# Patient Record
Sex: MALE | Race: WHITE | NOT HISPANIC OR LATINO | Employment: OTHER | URBAN - METROPOLITAN AREA
[De-identification: names, ages, dates, MRNs, and addresses within clinical notes are randomized per-mention and may not be internally consistent; named-entity substitution may affect disease eponyms.]

---

## 2017-02-20 DIAGNOSIS — E11.9 DIABETES MELLITUS (H): ICD-10-CM

## 2017-02-21 RX ORDER — SIMVASTATIN 40 MG
40 TABLET ORAL DAILY
Qty: 90 TABLET | Refills: 3 | Status: SHIPPED | OUTPATIENT
Start: 2017-02-21 | End: 2018-02-10

## 2017-02-21 NOTE — TELEPHONE ENCOUNTER
simvastatin (ZOCOR) 40 MG tablet      Last Written Prescription Date:  3-22-16  Last Fill Quantity: 90,   # refills: 3  Last Office Visit : 9-1-16  Future Office visit:  3-7-17    Kathleen M Doege RN

## 2017-03-07 ENCOUNTER — OFFICE VISIT (OUTPATIENT)
Dept: ENDOCRINOLOGY | Facility: CLINIC | Age: 67
End: 2017-03-07

## 2017-03-07 VITALS
DIASTOLIC BLOOD PRESSURE: 89 MMHG | HEIGHT: 71 IN | WEIGHT: 273.8 LBS | HEART RATE: 72 BPM | BODY MASS INDEX: 38.33 KG/M2 | SYSTOLIC BLOOD PRESSURE: 146 MMHG

## 2017-03-07 DIAGNOSIS — E11.65 TYPE 2 DIABETES MELLITUS WITH HYPERGLYCEMIA, WITHOUT LONG-TERM CURRENT USE OF INSULIN (H): Primary | ICD-10-CM

## 2017-03-07 LAB — HBA1C MFR BLD: 7.7 % (ref 4.3–6)

## 2017-03-07 ASSESSMENT — PAIN SCALES - GENERAL: PAINLEVEL: NO PAIN (0)

## 2017-03-07 NOTE — NURSING NOTE
"Chief Complaint   Patient presents with     RECHECK     DIABETES 2 F/U        Initial /89 (BP Location: Right arm, Patient Position: Chair, Cuff Size: Adult Large)  Pulse 72  Ht 1.803 m (5' 11\")  Wt 124.2 kg (273 lb 12.8 oz)  BMI 38.19 kg/m2 Estimated body mass index is 38.19 kg/(m^2) as calculated from the following:    Height as of this encounter: 1.803 m (5' 11\").    Weight as of this encounter: 124.2 kg (273 lb 12.8 oz).  Medication Reconciliation: complete       Angela Dixon CMA     "

## 2017-03-07 NOTE — PROGRESS NOTES
HPI   Mark Garces is a 66 year old male with type 2 diabetes mellitus here today for follow up visit.   Pt's hx is significant for type 2 DM dx in 2007, obesity and past hx of Hepatitis C which has been treated successfully.  For his diabetes, he is currently taking Metformin 500 mg 2 tabs po BID and Januvia 100 mg daily.   He tolerates both meds well.  Pt's A1C is 7.7 % today.  Pt's previous A1C was 7.5 %.   He did not bring his glucose meter to his visit today.  On ROS today, he reports feeling well.  His weight is up today.  He plans to start walking more this spring and summer.  He reports less back, hip and right leg pain.  He states is feeling better overall.  Some intermittent diarrhea which may be from his Metformin.  Pt denies any recent visual problems, n/v, SOB at rest, cough, chest pain or abd pain.   No dysuria, hematuria or foot ulcers.  Some numbness and pain intermittently in this right leg. He has hx chronic back issues.  No stool/urine incontinence.  He denies any numbness, tingling or pain in his left foot/leg.    DIABETES CARE:  Retinopathy: none; he is due for an Oph exam which he states he will schedule.  Nephropathy: none; urine microalbuminuria borderline last visit. I placed an order for another urine microalbuminuria today.  Neuropathy: no peripheral neuropathy; he does have some decrease sensor right foot.   Taking ASA: yes.  Lipids: LDL 87 in 4/2016 on Simvastatin.    ROS   Please see under HPI.     ALLERGIES:   No Known Allergies      Prescription Medications as of 3/7/2017             simvastatin (ZOCOR) 40 MG tablet Take 1 tablet (40 mg) by mouth daily    sitagliptin (JANUVIA) 100 MG tablet Take 1 tablet (100 mg) by mouth daily    metFORMIN (GLUCOPHAGE) 500 MG tablet Take two tabs twice daily.    aspirin 81 MG tablet Take 1 tablet by mouth daily.    Omega-3 Fatty Acids (FISH OIL) 500 MG CAPS Take 2 capsules by mouth daily.    glucose blood VI test strips (ONE TOUCH ULTRA TEST) strip  "Reported on 3/7/2017          Family Hx   No family hx of diabetes.    Personal Hx   Smoke: Pt quit smoking  > 3 years ago.  ETOH : None x 8 years.    PMH   1. Type 2 Diabetes Mellitus dx in 2007.   2. Hepatitis C; pt underwent tx for Hep C and his Hep C has cleared.  3. Obesity.  4. Past hx of nicotine use.   5. Hx of undescended testis s/p surgery in 1971.   6. Anemia while undergoing hep C treatment.    Physical Exam   General appearance:   Vitals:    03/07/17 0930   BP: 146/89   BP Location: Right arm   Patient Position: Chair   Cuff Size: Adult Large   Pulse: 72   Weight: 124.2 kg (273 lb 12.8 oz)   Height: 1.803 m (5' 11\")   GENERAL: Pt in no apparent distress.  SKIN: Dry.  HEENT: PERRLA; fundi not examined.  NECK: No goiter.  LUNGS: Clear b/l.  CARDIAC: RRR.  ABDOMEN: Large and nontender.  EXTREMITIES: No pretibial edema b/l.  FEET: Warm, no ulcers and normal monofilamentous exam left foot. Some decrease sensory right foot.    Results   Creatinine   Date Value Ref Range Status   04/06/2016 0.85 0.66 - 1.25 mg/dL Final     GFR Estimate   Date Value Ref Range Status   04/06/2016 >90  Non  GFR Calc   >60 mL/min/1.7m2 Final     Hemoglobin A1C   Date Value Ref Range Status   07/17/2013 7.0 (A) 4.3 - 6.0 % Final     Potassium   Date Value Ref Range Status   04/06/2016 4.0 3.4 - 5.3 mmol/L Final     ALT   Date Value Ref Range Status   04/06/2016 17 0 - 70 U/L Final     AST   Date Value Ref Range Status   04/06/2016 9 0 - 45 U/L Final     TSH   Date Value Ref Range Status   04/06/2016 2.72 0.40 - 4.00 mU/L Final     T4 Free   Date Value Ref Range Status   04/06/2016 0.92 0.76 - 1.46 ng/dL Final         Cholesterol   Date Value Ref Range Status   04/06/2016 156 <200 mg/dL Final   08/20/2014 136 <200 mg/dL Final     Comment:     LDL Cholesterol is the primary guide to therapy.   The NCEP recommends further evaluation of: patients with cholesterol greater   than 200 mg/dL if additional risk factors are " present, cholesterol greater   than   240 mg/dL, triglycerides greater than 150 mg/dL, or HDL less than 40 mg/dL.       HDL Cholesterol   Date Value Ref Range Status   04/06/2016 47 >39 mg/dL Final   08/20/2014 49 >40 mg/dL Final     LDL Cholesterol Calculated   Date Value Ref Range Status   04/06/2016 87 <100 mg/dL Final     Comment:     Desirable:       <100 mg/dl   08/20/2014 75 0 - 129 mg/dL Final     Comment:     LDL Cholesterol is the primary guide to therapy: LDL-cholesterol goal in high   risk patients is <100 mg/dL and in very high risk patients is <70 mg/dL.       Triglycerides   Date Value Ref Range Status   04/06/2016 107 <150 mg/dL Final   08/20/2014 58 0 - 150 mg/dL Final     Comment:     Fasting specimen     Cholesterol/HDL Ratio   Date Value Ref Range Status   08/20/2014 2.8 0.0 - 5.0 Final   11/19/2013 3.7 0.0 - 5.0 Final     A1C      7.7   3/7/2017  A1C      7.5   4/6/2016  A1C      7.4   7/28/2015  A1C      7.0   8/20/2014  A1C      6.8   3/18/2014  A1C      6.9  11/20/2013  A1C      7.0   7/17/2013  A1C      7.2   3/19/2013    ASSESSMENT/PLAN:     1. TYPE 2 DIABETES MELLITUS: Mark plans to increase his activity this spring and focus on making healthy food choices.  I have asked him to remain on Metformin and Januvia which he tolerates well.  His creat was good at 0.85 with GFR > 90 mL/min in 4/2016.  His urine microalbuminuria is borderline ( 17.27 mg/g Cr). I placed an order to have his urine microalbuminuria rechecked and if + , I plan to start him on an ace or ARB.  I have asked him to check his blood sugar at home fasting in the am and predinner daily and to bring his glucose meter to his visits.  He remains on daily ASA.   Pt will schedule an Oph exam near his home.  BP stable today.   Pt's TSH normal in 4/2016.    2. HYPERLIPIDEMIA: Pt's LDL 87 in 4/2016.  He remains on Simvastatin.  Both ALT/AST were normal in 4/2016.    3. OVERWEIGHT: Weight is up today.  Encouraged pt to make healthy  food choices, reduce food portions with meals, avoid snacking and increase his walking and activity.    4.  HX OF HEPATITIS C: Pt has completed his hep C treatment and states his hep C has clear.      5.  RIGHT FOOT NUMBNESS/TINGLING: I suggested he discuss this with his PCP.  This may be related to his chronic back issues.    6. Return to Endocrine Clinic to see me in 6 months.

## 2017-03-07 NOTE — MR AVS SNAPSHOT
After Visit Summary   3/7/2017    Mark Garces    MRN: [id number]           Patient Information     Date Of Birth          1950        Visit Information        Provider Department      3/7/2017 9:30 AM Elizabeth Guzman PA-C [facility]        Today's Diagnoses     Type 2 diabetes mellitus with hyperglycemia, without long-term current use of insulin (H)    -  1       Follow-ups after your visit        Follow-up notes from your care team     Return in about 6 months (around 9/7/2017).      Your next 10 appointments already scheduled     Sep 07, 2017  9:00 AM CDT   (Arrive by 8:45 AM)   RETURN DIABETES with KEYSHA Jiménez [facility] ([facility])    [address] [place] [id number] [phone]              Future tests that were ordered for you today     Open Future Orders        Priority Expected Expires Ordered    Albumin Random Urine Quantitative Routine 4/1/2017 7/1/2017 3/7/2017    Creatinine Routine 4/1/2017 7/1/2017 3/7/2017    Lipid Profile Routine 4/1/2017 7/1/2017 3/7/2017    TSH Routine 4/1/2017 7/1/2017 3/7/2017    Potassium Routine 4/1/2017 7/1/2017 3/7/2017    AST Routine 4/1/2017 7/1/2017 3/7/2017    ALT Routine 4/1/2017 7/1/2017 3/7/2017    T4 free Routine 4/1/2017 7/1/2017 3/7/2017            Who to contact     Please call your clinic at [phone] to:    Ask questions about your health    Make or cancel appointments    Discuss your medicines    Learn about your test results    Speak to your doctor   If you have compliments or concerns about an experience at your clinic, or if you wish to file a complaint, please contact [facility] Patient Relations at [phone] or email us at [email or web address]         Additional Information About Your Visit        MyChart Information     MyChart gives you secure access to your electronic health record. If  "you see a primary care provider, you can also send messages to your care team and make appointments. If you have questions, please call your primary care clinic.  If you do not have a primary care provider, please call [phone] and they will assist you.      "[employer]" is an electronic gateway that provides easy, online access to your medical records. With "[employer]", you can request a clinic appointment, read your test results, renew a prescription or communicate with your care team.     To access your existing account, please contact your [facility] or call [phone] for assistance.        Care EveryWhere ID     This is your Care EveryWhere ID. This could be used by other organizations to access your [facility] medical records  [id number]        Your Vitals Were     Pulse Height BMI (Body Mass Index)             72 1.803 m (5' 11\") 38.19 kg/m2          Blood Pressure from Last 3 Encounters:   03/07/17 146/89   09/02/16 138/72   04/06/16 159/78    Weight from Last 3 Encounters:   03/07/17 124.2 kg (273 lb 12.8 oz)   09/02/16 121.2 kg (267 lb 4.8 oz)   04/06/16 119.4 kg (263 lb 3.2 oz)              We Performed the Following     HC FLU VACCINE, INCREASED ANTIGEN, PRESV FREE        Primary Care Provider Office Phone # Fax #    [facility] [place] [phone]       [address]  [facility] [address]        Thank you!     Thank you for choosing [facility]  for your care. Our goal is always to provide you with excellent care. Hearing back from our patients is one way we can continue to improve our services. Please take a few minutes to complete the written survey that you may receive in the mail after your visit with us. Thank you!             Your Updated Medication List - Protect others around you: Learn how to safely use, store and throw away your medicines at www.disposemymeds.org.          This list is accurate as of: 3/7/17 " 10:13 AM.  Always use your most recent med list.                   Brand Name Dispense Instructions for use    aspirin 81 MG tablet      Take 1 tablet by mouth daily.       Fish Oil 500 MG Caps      Take 2 capsules by mouth daily.       metFORMIN 500 MG tablet    GLUCOPHAGE    360 tablet    Take two tabs twice daily.       ONE TOUCH ULTRA test strip   Generic drug:  blood glucose monitoring      Reported on 3/7/2017       simvastatin 40 MG tablet    ZOCOR    90 tablet    Take 1 tablet (40 mg) by mouth daily       sitagliptin 100 MG tablet    JANUVIA    90 tablet    Take 1 tablet (100 mg) by mouth daily

## 2017-03-07 NOTE — LETTER
3/7/2017       RE: Mark Garces  [address] [facility] [address]     Dear Colleague,    Thank you for referring your patient, Mark Garces, to the [facility] at [facility]. Please see a copy of my visit note below.    HPI   Mark Garces is a 66 year old male with type 2 diabetes mellitus here today for follow up visit.   Pt's hx is significant for type 2 DM dx in 2007, obesity and past hx of Hepatitis C which has been treated successfully.  For his diabetes, he is currently taking Metformin 500 mg 2 tabs po BID and Januvia 100 mg daily.   He tolerates both meds well.  Pt's A1C is 7.7 % today.  Pt's previous A1C was 7.5 %.   He did not bring his glucose meter to his visit today.  On ROS today, he reports feeling well.  His weight is up today.  He plans to start walking more this spring and summer.  He reports less back, hip and right leg pain.  He states is feeling better overall.  Some intermittent diarrhea which may be from his Metformin.  Pt denies any recent visual problems, n/v, SOB at rest, cough, chest pain or abd pain.   No dysuria, hematuria or foot ulcers.  Some numbness and pain intermittently in this right leg. He has hx chronic back issues.  No stool/urine incontinence.  He denies any numbness, tingling or pain in his left foot/leg.    DIABETES CARE:  Retinopathy: none; he is due for an Oph exam which he states he will schedule.  Nephropathy: none; urine microalbuminuria borderline last visit. I placed an order for another urine microalbuminuria today.  Neuropathy: no peripheral neuropathy; he does have some decrease sensor right foot.   Taking ASA: yes.  Lipids: LDL 87 in 4/2016 on Simvastatin.    ROS   Please see under HPI.     ALLERGIES:   No Known Allergies      Prescription Medications as of 3/7/2017             simvastatin (ZOCOR) 40 MG tablet Take 1 tablet (40 mg) by mouth daily    sitagliptin (JANUVIA) 100 MG tablet Take 1 tablet (100 mg)  "by mouth daily    metFORMIN (GLUCOPHAGE) 500 MG tablet Take two tabs twice daily.    aspirin 81 MG tablet Take 1 tablet by mouth daily.    Omega-3 Fatty Acids (FISH OIL) 500 MG CAPS Take 2 capsules by mouth daily.    glucose blood VI test strips (ONE TOUCH ULTRA TEST) strip Reported on 3/7/2017          Family Hx   No family hx of diabetes.    Personal Hx   Smoke: Pt quit smoking  > 3 years ago.  ETOH : None x 8 years.    PMH   1. Type 2 Diabetes Mellitus dx in 2007.   2. Hepatitis C; pt underwent tx for Hep C and his Hep C has cleared.  3. Obesity.  4. Past hx of nicotine use.   5. Hx of undescended testis s/p surgery in 1971.   6. Anemia while undergoing hep C treatment.    Physical Exam   General appearance:   Vitals:    03/07/17 0930   BP: 146/89   BP Location: Right arm   Patient Position: Chair   Cuff Size: Adult Large   Pulse: 72   Weight: 124.2 kg (273 lb 12.8 oz)   Height: 1.803 m (5' 11\")   GENERAL: Pt in no apparent distress.  SKIN: Dry.  HEENT: PERRLA; fundi not examined.  NECK: No goiter.  LUNGS: Clear b/l.  CARDIAC: RRR.  ABDOMEN: Large and nontender.  EXTREMITIES: No pretibial edema b/l.  FEET: Warm, no ulcers and normal monofilamentous exam left foot. Some decrease sensory right foot.    Results   Creatinine   Date Value Ref Range Status   04/06/2016 0.85 0.66 - 1.25 mg/dL Final     GFR Estimate   Date Value Ref Range Status   04/06/2016 >90  Non  GFR Calc   >60 mL/min/1.7m2 Final     Hemoglobin A1C   Date Value Ref Range Status   07/17/2013 7.0 (A) 4.3 - 6.0 % Final     Potassium   Date Value Ref Range Status   04/06/2016 4.0 3.4 - 5.3 mmol/L Final     ALT   Date Value Ref Range Status   04/06/2016 17 0 - 70 U/L Final     AST   Date Value Ref Range Status   04/06/2016 9 0 - 45 U/L Final     TSH   Date Value Ref Range Status   04/06/2016 2.72 0.40 - 4.00 mU/L Final     T4 Free   Date Value Ref Range Status   04/06/2016 0.92 0.76 - 1.46 ng/dL Final         Cholesterol   Date Value Ref " Range Status   04/06/2016 156 <200 mg/dL Final   08/20/2014 136 <200 mg/dL Final     Comment:     LDL Cholesterol is the primary guide to therapy.   The NCEP recommends further evaluation of: patients with cholesterol greater   than 200 mg/dL if additional risk factors are present, cholesterol greater   than   240 mg/dL, triglycerides greater than 150 mg/dL, or HDL less than 40 mg/dL.       HDL Cholesterol   Date Value Ref Range Status   04/06/2016 47 >39 mg/dL Final   08/20/2014 49 >40 mg/dL Final     LDL Cholesterol Calculated   Date Value Ref Range Status   04/06/2016 87 <100 mg/dL Final     Comment:     Desirable:       <100 mg/dl   08/20/2014 75 0 - 129 mg/dL Final     Comment:     LDL Cholesterol is the primary guide to therapy: LDL-cholesterol goal in high   risk patients is <100 mg/dL and in very high risk patients is <70 mg/dL.       Triglycerides   Date Value Ref Range Status   04/06/2016 107 <150 mg/dL Final   08/20/2014 58 0 - 150 mg/dL Final     Comment:     Fasting specimen     Cholesterol/HDL Ratio   Date Value Ref Range Status   08/20/2014 2.8 0.0 - 5.0 Final   11/19/2013 3.7 0.0 - 5.0 Final     A1C      7.7   3/7/2017  A1C      7.5   4/6/2016  A1C      7.4   7/28/2015  A1C      7.0   8/20/2014  A1C      6.8   3/18/2014  A1C      6.9  11/20/2013  A1C      7.0   7/17/2013  A1C      7.2   3/19/2013    ASSESSMENT/PLAN:     1. TYPE 2 DIABETES MELLITUS: Mark plans to increase his activity this spring and focus on making healthy food choices.  I have asked him to remain on Metformin and Januvia which he tolerates well.  His creat was good at 0.85 with GFR > 90 mL/min in 4/2016.  His urine microalbuminuria is borderline ( 17.27 mg/g Cr). I placed an order to have his urine microalbuminuria rechecked and if + , I plan to start him on an ace or ARB.  I have asked him to check his blood sugar at home fasting in the am and predinner daily and to bring his glucose meter to his visits.  He remains on daily ASA.    Pt will schedule an Oph exam near his home.  BP stable today.   Pt's TSH normal in 4/2016.    2. HYPERLIPIDEMIA: Pt's LDL 87 in 4/2016.  He remains on Simvastatin.  Both ALT/AST were normal in 4/2016.    3. OVERWEIGHT: Weight is up today.  Encouraged pt to make healthy food choices, reduce food portions with meals, avoid snacking and increase his walking and activity.    4.  HX OF HEPATITIS C: Pt has completed his hep C treatment and states his hep C has clear.      5.  RIGHT FOOT NUMBNESS/TINGLING: I suggested he discuss this with his PCP.  This may be related to his chronic back issues.    6. Return to Endocrine Clinic to see me in 6 months.    Again, thank you for allowing me to participate in the care of your patient.      Sincerely,    Elizabeth Guzman PA-C

## 2017-03-07 NOTE — NURSING NOTE
Administered influenza high dose injection per patient request.  Patient identified by name and .  Please see medication note for further med detail. Patient tolerated injection well.                Mark Garces      1.  Has the patient received the information for the influenza vaccine? YES    2.  Does the patient have any of the following contraindications?     Allergy to eggs? No     Allergic reaction to previous influenza vaccines? No     Any other problems to previous influenza vaccines? No     Paralyzed by Guillain-Pembroke syndrome? No     Currently pregnant? NO     Current moderate or severe illness? No     Allergy to contact lens solution? No    3.  The vaccine has been administered in the usual fashion and the patient was instructed to wait 20 minutes before leaving the building in the event of an allergic reaction: YES    Vaccination given by Angela Dixon CMA.  Recorded by Angela Dixon

## 2017-08-09 DIAGNOSIS — E11.9 TYPE 2 DIABETES MELLITUS WITHOUT COMPLICATION (H): ICD-10-CM

## 2017-08-09 NOTE — TELEPHONE ENCOUNTER
metFORMIN       Last Written Prescription Date:  9/7/16  Last Fill Quantity: 360,   # refills: 3  Last Office Visit : 3/7/17  Future Office visit:  9/7/17

## 2017-09-07 ENCOUNTER — OFFICE VISIT (OUTPATIENT)
Dept: ENDOCRINOLOGY | Facility: CLINIC | Age: 67
End: 2017-09-07

## 2017-09-07 VITALS
SYSTOLIC BLOOD PRESSURE: 131 MMHG | HEART RATE: 89 BPM | WEIGHT: 274 LBS | BODY MASS INDEX: 38.36 KG/M2 | HEIGHT: 71 IN | DIASTOLIC BLOOD PRESSURE: 85 MMHG

## 2017-09-07 DIAGNOSIS — E11.65 TYPE 2 DIABETES MELLITUS WITH HYPERGLYCEMIA, WITHOUT LONG-TERM CURRENT USE OF INSULIN (H): Primary | ICD-10-CM

## 2017-09-07 DIAGNOSIS — E11.65 TYPE 2 DIABETES MELLITUS WITH HYPERGLYCEMIA, WITHOUT LONG-TERM CURRENT USE OF INSULIN (H): ICD-10-CM

## 2017-09-07 LAB
ALT SERPL W P-5'-P-CCNC: 18 U/L (ref 0–70)
AST SERPL W P-5'-P-CCNC: 9 U/L (ref 0–45)
CHOLEST SERPL-MCNC: 132 MG/DL
CREAT SERPL-MCNC: 0.89 MG/DL (ref 0.66–1.25)
CREAT UR-MCNC: 147 MG/DL
GFR SERPL CREATININE-BSD FRML MDRD: 85 ML/MIN/1.7M2
HBA1C MFR BLD: 8.5 % (ref 4.3–6)
HDLC SERPL-MCNC: 43 MG/DL
LDLC SERPL CALC-MCNC: 64 MG/DL
MICROALBUMIN UR-MCNC: 14 MG/L
MICROALBUMIN/CREAT UR: 9.66 MG/G CR (ref 0–17)
NONHDLC SERPL-MCNC: 89 MG/DL
POTASSIUM SERPL-SCNC: 4.4 MMOL/L (ref 3.4–5.3)
T4 FREE SERPL-MCNC: 1.02 NG/DL (ref 0.76–1.46)
TRIGL SERPL-MCNC: 124 MG/DL
TSH SERPL DL<=0.005 MIU/L-ACNC: 2.49 MU/L (ref 0.4–4)

## 2017-09-07 ASSESSMENT — PAIN SCALES - GENERAL: PAINLEVEL: NO PAIN (0)

## 2017-09-07 NOTE — PROGRESS NOTES
HPI   Mark Garces is a 66 year old male with type 2 diabetes mellitus here today for follow up visit.   Pt's hx is significant for type 2 DM dx in 2007, obesity and past hx of Hepatitis C which has been treated successfully.  For his diabetes, he is currently taking Metformin 500 mg 2 tabs po BID and Januvia 100 mg daily.   He tolerates both meds well.  Mark denies missing his medications.  Pt's A1C is 8.5 %  today.  Pt's previous A1C was 7.7 %.   He did not bring his glucose meter to his visit today.  On ROS today, he reports feeling well.  His weight is up today.  He has been less active and admits to eating more carbs.  He reports less back, hip and right leg pain.    Some intermittent diarrhea which may be from his Metformin.  Pt denies any recent visual problems, n/v, SOB at rest, cough, chest pain or abd pain.   No dysuria, hematuria or foot ulcers.  Some numbness and pain intermittently in this right leg. He has hx chronic back issues.  No stool/urine incontinence.  He denies any numbness, tingling or pain in his left foot/leg.    DIABETES CARE:  Retinopathy: none; he is due for an Oph exam which he states he will schedule.  Nephropathy: none; urine microalbuminuria negative today.  Neuropathy: no peripheral neuropathy; he does have some decrease sensor right foot.   Taking ASA: yes.  Lipids: LDL 64 in 9/2017 on Simvastatin.    ROS   Please see under HPI.     ALLERGIES:   No Known Allergies      Prescription Medications as of 9/7/2017             metFORMIN (GLUCOPHAGE) 500 MG tablet Take 2 tablets (1,000 mg) by mouth 2 times daily (with meals)    simvastatin (ZOCOR) 40 MG tablet Take 1 tablet (40 mg) by mouth daily    sitagliptin (JANUVIA) 100 MG tablet Take 1 tablet (100 mg) by mouth daily    aspirin 81 MG tablet Take 1 tablet by mouth daily.    Omega-3 Fatty Acids (FISH OIL) 500 MG CAPS Take 2 capsules by mouth daily.    glucose blood VI test strips (ONE TOUCH ULTRA TEST) strip Reported on 3/7/2017     "      Family Hx   No family hx of diabetes.    Personal Hx   Smoke: None at this time.  ETOH : None at this time.    PMH   1. Type 2 Diabetes Mellitus dx in 2007.   2. Hepatitis C; pt underwent tx for Hep C and his Hep C has cleared.  3. Obesity.  4. Past hx of nicotine use.   5. Hx of undescended testis s/p surgery in 1971.   6. Anemia while undergoing hep C treatment.    Physical Exam   General appearance:   Vitals:    09/07/17 0852   BP: 131/85   Pulse: 89   Weight: 124.3 kg (274 lb)   Height: 1.803 m (5' 11\")     GENERAL: Pt in no apparent distress.  SKIN: No rash.  HEENT: PERRLA; fundi not examined.  NECK: No goiter.  LUNGS: Clear b/l.  CARDIAC: RRR.  ABDOMEN: Large and nontender.  EXTREMITIES: No pretibial edema b/l.  FEET: Warm, no ulcers and normal monofilamentous exam left foot. Some decrease sensory right foot.    Results   Creatinine   Date Value Ref Range Status   04/06/2016 0.85 0.66 - 1.25 mg/dL Final     GFR Estimate   Date Value Ref Range Status   04/06/2016 >90  Non  GFR Calc   >60 mL/min/1.7m2 Final     Hemoglobin A1C   Date Value Ref Range Status   07/17/2013 7.0 (A) 4.3 - 6.0 % Final     Potassium   Date Value Ref Range Status   04/06/2016 4.0 3.4 - 5.3 mmol/L Final     ALT   Date Value Ref Range Status   04/06/2016 17 0 - 70 U/L Final     AST   Date Value Ref Range Status   04/06/2016 9 0 - 45 U/L Final     TSH   Date Value Ref Range Status   04/06/2016 2.72 0.40 - 4.00 mU/L Final     T4 Free   Date Value Ref Range Status   04/06/2016 0.92 0.76 - 1.46 ng/dL Final         Cholesterol   Date Value Ref Range Status   04/06/2016 156 <200 mg/dL Final   08/20/2014 136 <200 mg/dL Final     Comment:     LDL Cholesterol is the primary guide to therapy.   The NCEP recommends further evaluation of: patients with cholesterol greater   than 200 mg/dL if additional risk factors are present, cholesterol greater   than   240 mg/dL, triglycerides greater than 150 mg/dL, or HDL less than 40 " mg/dL.       HDL Cholesterol   Date Value Ref Range Status   04/06/2016 47 >39 mg/dL Final   08/20/2014 49 >40 mg/dL Final     LDL Cholesterol Calculated   Date Value Ref Range Status   04/06/2016 87 <100 mg/dL Final     Comment:     Desirable:       <100 mg/dl   08/20/2014 75 0 - 129 mg/dL Final     Comment:     LDL Cholesterol is the primary guide to therapy: LDL-cholesterol goal in high   risk patients is <100 mg/dL and in very high risk patients is <70 mg/dL.       Triglycerides   Date Value Ref Range Status   04/06/2016 107 <150 mg/dL Final   08/20/2014 58 0 - 150 mg/dL Final     Comment:     Fasting specimen     Cholesterol/HDL Ratio   Date Value Ref Range Status   08/20/2014 2.8 0.0 - 5.0 Final   11/19/2013 3.7 0.0 - 5.0 Final     A1C      8.5   9/7/2017  A1C      7.7   3/7/2017  A1C      7.5   4/6/2016  A1C      7.4   7/28/2015  A1C      7.0   8/20/2014  A1C      6.8   3/18/2014  A1C      6.9  11/20/2013  A1C      7.0   7/17/2013  A1C      7.2   3/19/2013    ASSESSMENT/PLAN:     1. TYPE 2 DIABETES MELLITUS: Uncontrolled type 2 diabetes mellitus with an A1C of 8.5 % today.  Mark would like to work on his diet, lose weight and increase his activity prior to adding a new diabetic medication.  He refuses all injection medications.  For now, he is to remain on Metformin 1000 mg BID and Januvia 100 mg daily and see me in clinic in 3 months.  If his A1C is > 8.0 % next visit, will discuss adding Jardiance.  He does not want to use a GLP-1 med.  He is very fearful of any injection medications.  I asked him to schedule an Oph exam this Fall.  Feet are ok.  His urine microalbuminuria was negative today with a normal creat/GFR.  His TSH is normal today.    2. HYPERLIPIDEMIA: Pt's LDL 64 today.  He remains on Simvastatin.  Both ALT/AST are normal today.    3. OVERWEIGHT: Weight is up today.  Encouraged pt to make healthy food choices, reduce food portions with meals, avoid snacking and increase his walking and  activity.    4.  HX OF HEPATITIS C: Pt has completed his hep C treatment and states his hep C has clear.      5.  RIGHT FOOT NUMBNESS/TINGLING: I suggested he discuss this with his PCP.  This may be related to his chronic back issues.  He states he will schedule an annual exam with his PCP.    6. Return to Endocrine Clinic to see me in 3 months.

## 2017-09-07 NOTE — LETTER
9/7/2017       RE: Mark Garces  [address] [facility] [address]     Dear Colleague,    Thank you for referring your patient, Mark Garces, to the [facility] ENDOCRINOLOGY at [facility]. Please see a copy of my visit note below.    HPI   Mark Garces is a 66 year old male with type 2 diabetes mellitus here today for follow up visit.   Pt's hx is significant for type 2 DM dx in 2007, obesity and past hx of Hepatitis C which has been treated successfully.  For his diabetes, he is currently taking Metformin 500 mg 2 tabs po BID and Januvia 100 mg daily.   He tolerates both meds well.  Mark denies missing his medications.  Pt's A1C is 8.5 %  today.  Pt's previous A1C was 7.7 %.   He did not bring his glucose meter to his visit today.  On ROS today, he reports feeling well.  His weight is up today.  He has been less active and admits to eating more carbs.  He reports less back, hip and right leg pain.    Some intermittent diarrhea which may be from his Metformin.  Pt denies any recent visual problems, n/v, SOB at rest, cough, chest pain or abd pain.   No dysuria, hematuria or foot ulcers.  Some numbness and pain intermittently in this right leg. He has hx chronic back issues.  No stool/urine incontinence.  He denies any numbness, tingling or pain in his left foot/leg.    DIABETES CARE:  Retinopathy: none; he is due for an Oph exam which he states he will schedule.  Nephropathy: none; urine microalbuminuria negative today.  Neuropathy: no peripheral neuropathy; he does have some decrease sensor right foot.   Taking ASA: yes.  Lipids: LDL 64 in 9/2017 on Simvastatin.    ROS   Please see under HPI.     ALLERGIES:   No Known Allergies      Prescription Medications as of 9/7/2017             metFORMIN (GLUCOPHAGE) 500 MG tablet Take 2 tablets (1,000 mg) by mouth 2 times daily (with meals)    simvastatin (ZOCOR) 40 MG tablet Take 1 tablet (40 mg) by mouth daily    sitagliptin  "(JANUVIA) 100 MG tablet Take 1 tablet (100 mg) by mouth daily    aspirin 81 MG tablet Take 1 tablet by mouth daily.    Omega-3 Fatty Acids (FISH OIL) 500 MG CAPS Take 2 capsules by mouth daily.    glucose blood VI test strips (ONE TOUCH ULTRA TEST) strip Reported on 3/7/2017          Family Hx   No family hx of diabetes.    Personal Hx   Smoke: None at this time.  ETOH : None at this time.    PMH   1. Type 2 Diabetes Mellitus dx in 2007.   2. Hepatitis C; pt underwent tx for Hep C and his Hep C has cleared.  3. Obesity.  4. Past hx of nicotine use.   5. Hx of undescended testis s/p surgery in 1971.   6. Anemia while undergoing hep C treatment.    Physical Exam   General appearance:   Vitals:    09/07/17 0852   BP: 131/85   Pulse: 89   Weight: 124.3 kg (274 lb)   Height: 1.803 m (5' 11\")     GENERAL: Pt in no apparent distress.  SKIN: No rash.  HEENT: PERRLA; fundi not examined.  NECK: No goiter.  LUNGS: Clear b/l.  CARDIAC: RRR.  ABDOMEN: Large and nontender.  EXTREMITIES: No pretibial edema b/l.  FEET: Warm, no ulcers and normal monofilamentous exam left foot. Some decrease sensory right foot.    Results   Creatinine   Date Value Ref Range Status   04/06/2016 0.85 0.66 - 1.25 mg/dL Final     GFR Estimate   Date Value Ref Range Status   04/06/2016 >90  Non  GFR Calc   >60 mL/min/1.7m2 Final     Hemoglobin A1C   Date Value Ref Range Status   07/17/2013 7.0 (A) 4.3 - 6.0 % Final     Potassium   Date Value Ref Range Status   04/06/2016 4.0 3.4 - 5.3 mmol/L Final     ALT   Date Value Ref Range Status   04/06/2016 17 0 - 70 U/L Final     AST   Date Value Ref Range Status   04/06/2016 9 0 - 45 U/L Final     TSH   Date Value Ref Range Status   04/06/2016 2.72 0.40 - 4.00 mU/L Final     T4 Free   Date Value Ref Range Status   04/06/2016 0.92 0.76 - 1.46 ng/dL Final         Cholesterol   Date Value Ref Range Status   04/06/2016 156 <200 mg/dL Final   08/20/2014 136 <200 mg/dL Final     Comment:     LDL " Cholesterol is the primary guide to therapy.   The NCEP recommends further evaluation of: patients with cholesterol greater   than 200 mg/dL if additional risk factors are present, cholesterol greater   than   240 mg/dL, triglycerides greater than 150 mg/dL, or HDL less than 40 mg/dL.       HDL Cholesterol   Date Value Ref Range Status   04/06/2016 47 >39 mg/dL Final   08/20/2014 49 >40 mg/dL Final     LDL Cholesterol Calculated   Date Value Ref Range Status   04/06/2016 87 <100 mg/dL Final     Comment:     Desirable:       <100 mg/dl   08/20/2014 75 0 - 129 mg/dL Final     Comment:     LDL Cholesterol is the primary guide to therapy: LDL-cholesterol goal in high   risk patients is <100 mg/dL and in very high risk patients is <70 mg/dL.       Triglycerides   Date Value Ref Range Status   04/06/2016 107 <150 mg/dL Final   08/20/2014 58 0 - 150 mg/dL Final     Comment:     Fasting specimen     Cholesterol/HDL Ratio   Date Value Ref Range Status   08/20/2014 2.8 0.0 - 5.0 Final   11/19/2013 3.7 0.0 - 5.0 Final     A1C      8.5   9/7/2017  A1C      7.7   3/7/2017  A1C      7.5   4/6/2016  A1C      7.4   7/28/2015  A1C      7.0   8/20/2014  A1C      6.8   3/18/2014  A1C      6.9  11/20/2013  A1C      7.0   7/17/2013  A1C      7.2   3/19/2013    ASSESSMENT/PLAN:     1. TYPE 2 DIABETES MELLITUS: Uncontrolled type 2 diabetes mellitus with an A1C of 8.5 % today.  Mark would like to work on his diet, lose weight and increase his activity prior to adding a new diabetic medication.  He refuses all injection medications.  For now, he is to remain on Metformin 1000 mg BID and Januvia 100 mg daily and see me in clinic in 3 months.  If his A1C is > 8.0 % next visit, will discuss adding Jardiance.  He does not want to use a GLP-1 med.  He is very fearful of any injection medications.  I asked him to schedule an Oph exam this Fall.  Feet are ok.  His urine microalbuminuria was negative today with a normal creat/GFR.  His TSH is  normal today.    2. HYPERLIPIDEMIA: Pt's LDL 64 today.  He remains on Simvastatin.  Both ALT/AST are normal today.    3. OVERWEIGHT: Weight is up today.  Encouraged pt to make healthy food choices, reduce food portions with meals, avoid snacking and increase his walking and activity.    4.  HX OF HEPATITIS C: Pt has completed his hep C treatment and states his hep C has clear.      5.  RIGHT FOOT NUMBNESS/TINGLING: I suggested he discuss this with his PCP.  This may be related to his chronic back issues.  He states he will schedule an annual exam with his PCP.    6. Return to Endocrine Clinic to see me in 3 months.        Elizabeth Guzman PA-C

## 2017-09-07 NOTE — MR AVS SNAPSHOT
After Visit Summary   9/7/2017    Mark Garces    MRN: [id number]           Patient Information     Date Of Birth          1950        Visit Information        Provider Department      9/7/2017 9:00 AM Elizabeth Guzman PA-C [facility] Endocrinology        Today's Diagnoses     Type 2 diabetes mellitus with hyperglycemia, without long-term current use of insulin (H)    -  1       Follow-ups after your visit        Your next 10 appointments already scheduled     Dec 07, 2017  9:00 AM CST   (Arrive by 8:45 AM)   RETURN DIABETES with KEYSHA Jiménez [facility] Endocrinology ([facility] [place])    [address]  [address] [facility] [address] [phone]              Who to contact     Please call your clinic at [phone] to:    Ask questions about your health    Make or cancel appointments    Discuss your medicines    Learn about your test results    Speak to your doctor   If you have compliments or concerns about an experience at your clinic, or if you wish to file a complaint, please contact [facility] Patient Relations at [phone] or email us at [email or web address]         Additional Information About Your Visit        MyChart Information     [employer] gives you secure access to your electronic health record. If you see a primary care provider, you can also send messages to your care team and make appointments. If you have questions, please call your primary care clinic.  If you do not have a primary care provider, please call [phone] and they will assist you.      [employer] is an electronic gateway that provides easy, online access to your medical records. With [employer], you can request a clinic appointment, read your test results, renew a prescription or communicate with your care team.     To access your existing account, please contact your [facility] Clinic or call  "[phone] for assistance.        Care EveryWhere ID     This is your Care EveryWhere ID. This could be used by other organizations to access your [place] medical records  [id number]        Your Vitals Were     Pulse Height BMI (Body Mass Index)             89 1.803 m (5' 11\") 38.22 kg/m2          Blood Pressure from Last 3 Encounters:   09/07/17 131/85   03/07/17 146/89   09/02/16 138/72    Weight from Last 3 Encounters:   09/07/17 124.3 kg (274 lb)   03/07/17 124.2 kg (273 lb 12.8 oz)   09/02/16 121.2 kg (267 lb 4.8 oz)              Today, you had the following     No orders found for display       Primary Care Provider Office Phone # Fax #    [facility] [phone]       [address]  [place] [address]        Equal Access to Services     LAURA DESAI : Hadii sirena ku hadasho Soomaali, waaxda luqadaha, qaybta kaalmada adeegyada, waxay juan jin hayvinny buckley . So [facility] [phone].    ATENCIÓN: Si habla español, tiene a brown disposición servicios gratuitos de asistencia lingüística. Llame al [phone].    We comply with applicable federal civil rights laws and [place] laws. We do not discriminate on the basis of race, color, national origin, age, disability sex, sexual orientation or gender identity.            Thank you!     Thank you for choosing [facility] ENDOCRINOLOGY  for your care. Our goal is always to provide you with excellent care. Hearing back from our patients is one way we can continue to improve our services. Please take a few minutes to complete the written survey that you may receive in the mail after your visit with us. Thank you!             Your Updated Medication List - Protect others around you: Learn how to safely use, store and throw away your medicines at www.disposemymeds.org.          This list is accurate as of: 9/7/17  9:41 AM.  Always use your most recent med list.                   Brand Name Dispense Instructions for use " Diagnosis    aspirin 81 MG tablet      Take 1 tablet by mouth daily.        Fish Oil 500 MG Caps      Take 2 capsules by mouth daily.        metFORMIN 500 MG tablet    GLUCOPHAGE    360 tablet    Take 2 tablets (1,000 mg) by mouth 2 times daily (with meals)    Type 2 diabetes mellitus without complication (H)       ONE TOUCH ULTRA test strip   Generic drug:  blood glucose monitoring      Reported on 3/7/2017        simvastatin 40 MG tablet    ZOCOR    90 tablet    Take 1 tablet (40 mg) by mouth daily    Diabetes mellitus (H)       sitagliptin 100 MG tablet    JANUVIA    90 tablet    Take 1 tablet (100 mg) by mouth daily    Type 2 diabetes mellitus with hyperglycemia (H)

## 2017-09-07 NOTE — NURSING NOTE
Chief Complaint   Patient presents with     RECHECK     F/U TYPE II DM     Amalia Valles, CMA  Endocrinology & Diabetes 3G    Capillary Fingerstick performed for an Hemoglobin A1C test

## 2017-12-07 ENCOUNTER — OFFICE VISIT (OUTPATIENT)
Dept: ENDOCRINOLOGY | Facility: CLINIC | Age: 67
End: 2017-12-07

## 2017-12-07 VITALS
HEART RATE: 88 BPM | SYSTOLIC BLOOD PRESSURE: 113 MMHG | WEIGHT: 253.9 LBS | DIASTOLIC BLOOD PRESSURE: 74 MMHG | HEIGHT: 71 IN | BODY MASS INDEX: 35.55 KG/M2

## 2017-12-07 DIAGNOSIS — E11.65 TYPE 2 DIABETES MELLITUS WITH HYPERGLYCEMIA, WITHOUT LONG-TERM CURRENT USE OF INSULIN (H): Primary | ICD-10-CM

## 2017-12-07 DIAGNOSIS — Z23 NEED FOR PROPHYLACTIC VACCINATION AND INOCULATION AGAINST INFLUENZA: ICD-10-CM

## 2017-12-07 LAB — HBA1C MFR BLD: 6.9 % (ref 4.3–6)

## 2017-12-07 ASSESSMENT — PAIN SCALES - GENERAL: PAINLEVEL: NO PAIN (0)

## 2017-12-07 NOTE — PATIENT INSTRUCTIONS
1. Reduce Januvia 50 mg daily.  2.  Continue Metformin.  3.  Continue to eat healthy and keep weight off.  Good job!!

## 2017-12-07 NOTE — LETTER
12/7/2017       RE: Mark Garces  [address] [place] [id number]     Dear Colleague,    Thank you for referring your patient, Mark Garces, to the [facility] ENDOCRINOLOGY at [facility]. Please see a copy of my visit note below.    HPI   Mark Garces is a 67 year old male with type 2 diabetes mellitus here today for follow up visit.   Pt's hx is significant for type 2 DM dx in 2007, obesity and past hx of Hepatitis C which has been treated successfully.  For his diabetes, he is currently taking Metformin 500 mg 2 tabs po BID and Januvia 100 mg daily.   He tolerates both meds well.  Mark denies missing his medications.  Pt's A1C is 6.9 %  today.  Pt's previous A1C was 8.5 %.   He did not bring his glucose meter to his visit today.  Pt states he is not checking his blood sugar at home because he does not like to stick himself with the lancets.  He has been having some symptoms suggestive of hypoglycemia.  On ROS today, he reports feeling well.  He has lost 30 lbs since his last visit.  He is eating healthy and feels much better overall.  He reports less back, hip and right leg pain.    Some intermittent loose stools in the am which may be from his Metformin. No blood in the stool and no abdominal pain.  Pt denies frequent headaches, blurred vision,  n/v, SOB at rest, cough or chest pain.  No dysuria, hematuria or foot ulcers.  Some numbness and pain intermittently in this right leg. He has hx chronic back issues.  No stool/urine incontinence.  He denies any numbness, tingling or pain in his left foot/leg.    DIABETES CARE:  Retinopathy: none; he is due for an Oph exam which he states he will schedule.  He would like to see an Oph closer to his home.  Nephropathy: none; urine microalbuminuria negative in 9/2017.  Neuropathy: no peripheral neuropathy; he does have some decrease sensor right foot.   Taking ASA: yes.  Lipids: LDL 64 in 9/2017 on Simvastatin.    ROS   Please  "see under HPI.     ALLERGIES:   No Known Allergies      Prescription Medications as of 12/7/2017             sitagliptin (JANUVIA) 50 MG tablet Take 1 tablet (50 mg) by mouth daily    metFORMIN (GLUCOPHAGE) 500 MG tablet Take 2 tablets (1,000 mg) by mouth 2 times daily (with meals)    simvastatin (ZOCOR) 40 MG tablet Take 1 tablet (40 mg) by mouth daily    aspirin 81 MG tablet Take 1 tablet by mouth daily.    Omega-3 Fatty Acids (FISH OIL) 500 MG CAPS Take 2 capsules by mouth daily.    glucose blood VI test strips (ONE TOUCH ULTRA TEST) strip Reported on 3/7/2017        Family Hx   No family hx of diabetes.    Personal Hx   Smoke: None at this time.  ETOH : None at this time.    PMH   1. Type 2 Diabetes Mellitus dx in 2007.   2. Hepatitis C; pt underwent tx for Hep C and his Hep C has cleared.  3. Obesity.  4. Past hx of nicotine use.   5. Hx of undescended testis s/p surgery in 1971.   6. Anemia while undergoing hep C treatment.    Physical Exam   General appearance:   Vitals:    12/07/17 0900   BP: 113/74   Pulse: 88   Weight: 115.2 kg (253 lb 14.4 oz)   Height: 1.803 m (5' 11\")     GENERAL: Pt in no apparent distress.  SKIN: No rash.  HEENT: PERRLA; fundi not examined.  NECK: No goiter.  LUNGS: Clear b/l.  CARDIAC: RRR.  ABDOMEN: Large and nontender.  EXTREMITIES: No pretibial edema b/l.  FEET: Warm, no ulcers and normal monofilamentous exam left foot. Some decrease sensory right foot.    Results   Creatinine   Date Value Ref Range Status   09/07/2017 0.89 0.66 - 1.25 mg/dL Final     GFR Estimate   Date Value Ref Range Status   09/07/2017 85 >60 mL/min/1.7m2 Final     Comment:     Non  GFR Calc     Hemoglobin A1C   Date Value Ref Range Status   07/17/2013 7.0 (A) 4.3 - 6.0 % Final     Potassium   Date Value Ref Range Status   09/07/2017 4.4 3.4 - 5.3 mmol/L Final     ALT   Date Value Ref Range Status   09/07/2017 18 0 - 70 U/L Final     AST   Date Value Ref Range Status   09/07/2017 9 0 - 45 U/L " Final     TSH   Date Value Ref Range Status   09/07/2017 2.49 0.40 - 4.00 mU/L Final     T4 Free   Date Value Ref Range Status   09/07/2017 1.02 0.76 - 1.46 ng/dL Final         Cholesterol   Date Value Ref Range Status   09/07/2017 132 <200 mg/dL Final   04/06/2016 156 <200 mg/dL Final     HDL Cholesterol   Date Value Ref Range Status   09/07/2017 43 >39 mg/dL Final   04/06/2016 47 >39 mg/dL Final     LDL Cholesterol Calculated   Date Value Ref Range Status   09/07/2017 64 <100 mg/dL Final     Comment:     Desirable:       <100 mg/dl   04/06/2016 87 <100 mg/dL Final     Comment:     Desirable:       <100 mg/dl     Triglycerides   Date Value Ref Range Status   09/07/2017 124 <150 mg/dL Final   04/06/2016 107 <150 mg/dL Final     Cholesterol/HDL Ratio   Date Value Ref Range Status   08/20/2014 2.8 0.0 - 5.0 Final   11/19/2013 3.7 0.0 - 5.0 Final     A1C      6.9   12/7/2017  A1C      8.5   9/7/2017  A1C      7.7   3/7/2017  A1C      7.5   4/6/2016  A1C      7.4   7/28/2015  A1C      7.0   8/20/2014  A1C      6.8   3/18/2014  A1C      6.9  11/20/2013  A1C      7.0   7/17/2013  A1C      7.2   3/19/2013    ASSESSMENT/PLAN:     1. TYPE 2 DIABETES MELLITUS: Patient's A1C has improved and is 6.9 % today with a 30 lb weight loss.  I encouraged Mark to continue to make healthy food choices.  I did have him reduce his Januvia 50 mg daily and he is to remain on his current dose of Metformin.  He refuses to check his blood sugar at home.  Mark plans to schedule an Oph exam.  Feet are ok.  His urine microalbuminuria was negative in 9/2017 with a normal creat/GFR.  His TSH was normal in Sept 2017.  Flu vaccine given today.    2. HYPERLIPIDEMIA: Pt's LDL 64 in 9/2017.   He remains on Simvastatin.    3. OVERWEIGHT: Mark has done a nice job of losing 30 lbs. See under # 1 above.    4.  HX OF HEPATITIS C: Pt has completed his hep C treatment and states his hep C has clear.      5.  RIGHT FOOT NUMBNESS/TINGLING: I suggested he  discuss this with his PCP.  This may be related to his chronic back issues.  He states he will schedule an annual exam with his PCP.    6. Return to Endocrine Clinic to see me in 4 months.                                Injectable Influenza Immunization Documentation    1.  Is the person to be vaccinated sick today?   No    2. Does the person to be vaccinated have an allergy to a component   of the vaccine?   No  Egg Allergy Algorithm Link    3. Has the person to be vaccinated ever had a serious reaction   to influenza vaccine in the past?   No    4. Has the person to be vaccinated ever had Guillain-Barré syndrome?   No    Form completed by VANNESSA ELLIS CMA        Again, thank you for allowing me to participate in the care of your patient.      Sincerely,    Elizabeth Guzman PA-C

## 2017-12-07 NOTE — MR AVS SNAPSHOT
After Visit Summary   12/7/2017    Mark Garces    MRN: [id number]           Patient Information     Date Of Birth          1950        Visit Information        Provider Department      12/7/2017 9:00 AM Elizabeth Guzman PA-C [facility]        Today's Diagnoses     Type 2 diabetes mellitus with hyperglycemia, without long-term current use of insulin (H)    -  1      Care Instructions    1. Reduce Januvia 50 mg daily.  2.  Continue Metformin.  3.  Continue to eat healthy and keep weight off.  Good job!!          Follow-ups after your visit        Your next 10 appointments already scheduled     Apr 05, 2018 10:30 AM CDT   (Arrive by 10:15 AM)   RETURN DIABETES with KEYSHA Jiménez [facility] Endocrinology ([facility])    [address] [place] [address] [phone]              Who to contact     Please call your clinic at [phone] to:    Ask questions about your health    Make or cancel appointments    Discuss your medicines    Learn about your test results    Speak to your doctor   If you have compliments or concerns about an experience at your clinic, or if you wish to file a complaint, please contact [facility] Patient Relations at [phone] or email us at [email or web address]         Additional Information About Your Visit        MyChart Information     [employer] gives you secure access to your electronic health record. If you see a primary care provider, you can also send messages to your care team and make appointments. If you have questions, please call your primary care clinic.  If you do not have a primary care provider, please call [phone] and they will assist you.      Gigwalk is an electronic gateway that provides easy, online access to your medical records. With Gigwalk, you can request a clinic appointment, read your test results, renew a prescription  "or communicate with your care team.     To access your existing account, please contact your [facility] or call [phone] for assistance.        Care EveryWhere ID     This is your Care EveryWhere ID. This could be used by other organizations to access your [place] medical records  [id number]        Your Vitals Were     Pulse Height BMI (Body Mass Index)             88 1.803 m (5' 11\") 35.41 kg/m2          Blood Pressure from Last 3 Encounters:   12/07/17 113/74   09/07/17 131/85   03/07/17 146/89    Weight from Last 3 Encounters:   12/07/17 115.2 kg (253 lb 14.4 oz)   09/07/17 124.3 kg (274 lb)   03/07/17 124.2 kg (273 lb 12.8 oz)              Today, you had the following     No orders found for display         Today's Medication Changes          These changes are accurate as of: 12/7/17  9:32 AM.  If you have any questions, ask your nurse or doctor.               These medicines have changed or have updated prescriptions.        Dose/Directions    JANUVIA 50 MG tablet   This may have changed:  Another medication with the same name was removed. Continue taking this medication, and follow the directions you see here.   Used for:  Type 2 diabetes mellitus with hyperglycemia, without long-term current use of insulin (H)   Generic drug:  sitagliptin        Dose:  50 mg   Take 1 tablet (50 mg) by mouth daily   Quantity:  90 tablet   Refills:  3                Primary Care Provider Office Phone # Fax #    SPIKE Max [phone]       [address] [place] [address] [address]  [facility] [address]        Equal Access to Services     LAURA DESAI : Hadii sirena sullivan Sotoshia, waaxda luqadaha, qaybta kaalmabailee olmos. So [facility] [phone].    ATENCIÓN: Si habla español, tiene a brown disposición servicios gratuitos de asistencia lingüística. Llame al [phone].    We comply with applicable federal civil rights laws and " [place] laws. We do not discriminate on the basis of race, color, national origin, age, disability, sex, sexual orientation, or gender identity.            Thank you!     Thank you for choosing [facility] ENDOCRINOLOGY  for your care. Our goal is always to provide you with excellent care. Hearing back from our patients is one way we can continue to improve our services. Please take a few minutes to complete the written survey that you may receive in the mail after your visit with us. Thank you!             Your Updated Medication List - Protect others around you: Learn how to safely use, store and throw away your medicines at www.disposemymeds.org.          This list is accurate as of: 12/7/17  9:32 AM.  Always use your most recent med list.                   Brand Name Dispense Instructions for use Diagnosis    aspirin 81 MG tablet      Take 1 tablet by mouth daily.        Fish Oil 500 MG Caps      Take 2 capsules by mouth daily.        JANUVIA 50 MG tablet   Generic drug:  sitagliptin     90 tablet    Take 1 tablet (50 mg) by mouth daily    Type 2 diabetes mellitus with hyperglycemia, without long-term current use of insulin (H)       metFORMIN 500 MG tablet    GLUCOPHAGE    360 tablet    Take 2 tablets (1,000 mg) by mouth 2 times daily (with meals)    Type 2 diabetes mellitus without complication (H)       ONETOUCH ULTRA test strip   Generic drug:  blood glucose monitoring      Reported on 3/7/2017        simvastatin 40 MG tablet    ZOCOR    90 tablet    Take 1 tablet (40 mg) by mouth daily    Diabetes mellitus (H)

## 2017-12-07 NOTE — PROGRESS NOTES
HPI   Mark Garces is a 67 year old male with type 2 diabetes mellitus here today for follow up visit.   Pt's hx is significant for type 2 DM dx in 2007, obesity and past hx of Hepatitis C which has been treated successfully.  For his diabetes, he is currently taking Metformin 500 mg 2 tabs po BID and Januvia 100 mg daily.   He tolerates both meds well.  Mark denies missing his medications.  Pt's A1C is 6.9 %  today.  Pt's previous A1C was 8.5 %.   He did not bring his glucose meter to his visit today.  Pt states he is not checking his blood sugar at home because he does not like to stick himself with the lancets.  He has been having some symptoms suggestive of hypoglycemia.  On ROS today, he reports feeling well.  He has lost 30 lbs since his last visit.  He is eating healthy and feels much better overall.  He reports less back, hip and right leg pain.    Some intermittent loose stools in the am which may be from his Metformin. No blood in the stool and no abdominal pain.  Pt denies frequent headaches, blurred vision,  n/v, SOB at rest, cough or chest pain.  No dysuria, hematuria or foot ulcers.  Some numbness and pain intermittently in this right leg. He has hx chronic back issues.  No stool/urine incontinence.  He denies any numbness, tingling or pain in his left foot/leg.    DIABETES CARE:  Retinopathy: none; he is due for an Oph exam which he states he will schedule.  He would like to see an Oph closer to his home.  Nephropathy: none; urine microalbuminuria negative in 9/2017.  Neuropathy: no peripheral neuropathy; he does have some decrease sensor right foot.   Taking ASA: yes.  Lipids: LDL 64 in 9/2017 on Simvastatin.    ROS   Please see under HPI.     ALLERGIES:   No Known Allergies      Prescription Medications as of 12/7/2017             sitagliptin (JANUVIA) 50 MG tablet Take 1 tablet (50 mg) by mouth daily    metFORMIN (GLUCOPHAGE) 500 MG tablet Take 2 tablets (1,000 mg) by mouth 2 times daily (with  "meals)    simvastatin (ZOCOR) 40 MG tablet Take 1 tablet (40 mg) by mouth daily    aspirin 81 MG tablet Take 1 tablet by mouth daily.    Omega-3 Fatty Acids (FISH OIL) 500 MG CAPS Take 2 capsules by mouth daily.    glucose blood VI test strips (ONE TOUCH ULTRA TEST) strip Reported on 3/7/2017        Family Hx   No family hx of diabetes.    Personal Hx   Smoke: None at this time.  ETOH : None at this time.    PMH   1. Type 2 Diabetes Mellitus dx in 2007.   2. Hepatitis C; pt underwent tx for Hep C and his Hep C has cleared.  3. Obesity.  4. Past hx of nicotine use.   5. Hx of undescended testis s/p surgery in 1971.   6. Anemia while undergoing hep C treatment.    Physical Exam   General appearance:   Vitals:    12/07/17 0900   BP: 113/74   Pulse: 88   Weight: 115.2 kg (253 lb 14.4 oz)   Height: 1.803 m (5' 11\")     GENERAL: Pt in no apparent distress.  SKIN: No rash.  HEENT: PERRLA; fundi not examined.  NECK: No goiter.  LUNGS: Clear b/l.  CARDIAC: RRR.  ABDOMEN: Large and nontender.  EXTREMITIES: No pretibial edema b/l.  FEET: Warm, no ulcers and normal monofilamentous exam left foot. Some decrease sensory right foot.    Results   Creatinine   Date Value Ref Range Status   09/07/2017 0.89 0.66 - 1.25 mg/dL Final     GFR Estimate   Date Value Ref Range Status   09/07/2017 85 >60 mL/min/1.7m2 Final     Comment:     Non  GFR Calc     Hemoglobin A1C   Date Value Ref Range Status   07/17/2013 7.0 (A) 4.3 - 6.0 % Final     Potassium   Date Value Ref Range Status   09/07/2017 4.4 3.4 - 5.3 mmol/L Final     ALT   Date Value Ref Range Status   09/07/2017 18 0 - 70 U/L Final     AST   Date Value Ref Range Status   09/07/2017 9 0 - 45 U/L Final     TSH   Date Value Ref Range Status   09/07/2017 2.49 0.40 - 4.00 mU/L Final     T4 Free   Date Value Ref Range Status   09/07/2017 1.02 0.76 - 1.46 ng/dL Final         Cholesterol   Date Value Ref Range Status   09/07/2017 132 <200 mg/dL Final   04/06/2016 156 <200 " mg/dL Final     HDL Cholesterol   Date Value Ref Range Status   09/07/2017 43 >39 mg/dL Final   04/06/2016 47 >39 mg/dL Final     LDL Cholesterol Calculated   Date Value Ref Range Status   09/07/2017 64 <100 mg/dL Final     Comment:     Desirable:       <100 mg/dl   04/06/2016 87 <100 mg/dL Final     Comment:     Desirable:       <100 mg/dl     Triglycerides   Date Value Ref Range Status   09/07/2017 124 <150 mg/dL Final   04/06/2016 107 <150 mg/dL Final     Cholesterol/HDL Ratio   Date Value Ref Range Status   08/20/2014 2.8 0.0 - 5.0 Final   11/19/2013 3.7 0.0 - 5.0 Final     A1C      6.9   12/7/2017  A1C      8.5   9/7/2017  A1C      7.7   3/7/2017  A1C      7.5   4/6/2016  A1C      7.4   7/28/2015  A1C      7.0   8/20/2014  A1C      6.8   3/18/2014  A1C      6.9  11/20/2013  A1C      7.0   7/17/2013  A1C      7.2   3/19/2013    ASSESSMENT/PLAN:     1. TYPE 2 DIABETES MELLITUS: Patient's A1C has improved and is 6.9 % today with a 30 lb weight loss.  I encouraged Mark to continue to make healthy food choices.  I did have him reduce his Januvia 50 mg daily and he is to remain on his current dose of Metformin.  He refuses to check his blood sugar at home.  Mark plans to schedule an Oph exam.  Feet are ok.  His urine microalbuminuria was negative in 9/2017 with a normal creat/GFR.  His TSH was normal in Sept 2017.  Flu vaccine given today.    2. HYPERLIPIDEMIA: Pt's LDL 64 in 9/2017.   He remains on Simvastatin.    3. OVERWEIGHT: Mark has done a nice job of losing 30 lbs. See under # 1 above.    4.  HX OF HEPATITIS C: Logan has completed his hep C treatment and states his hep C has clear.      5.  RIGHT FOOT NUMBNESS/TINGLING: I suggested he discuss this with his PCP.  This may be related to his chronic back issues.  He states he will schedule an annual exam with his PCP.    6. Return to Endocrine Clinic to see me in 4 months.

## 2017-12-07 NOTE — PROGRESS NOTES

## 2017-12-20 DIAGNOSIS — E11.65 TYPE 2 DIABETES MELLITUS WITH HYPERGLYCEMIA (H): ICD-10-CM

## 2017-12-22 NOTE — TELEPHONE ENCOUNTER
sitagliptin (JANUVIA) 50 MG    Last Written Prescription Date:  12/7/17  Last Fill Quantity: 90,   # refills: 3  Last Office Visit : 12/7/17  Future Office visit:  4/5/18    Routing refill request to provider for review/approval because:  Medication is reported/historical

## 2017-12-28 DIAGNOSIS — E11.65 TYPE 2 DIABETES MELLITUS WITH HYPERGLYCEMIA, WITHOUT LONG-TERM CURRENT USE OF INSULIN (H): ICD-10-CM

## 2018-02-10 DIAGNOSIS — E11.9 DIABETES MELLITUS (H): ICD-10-CM

## 2018-02-10 RX ORDER — SIMVASTATIN 40 MG
40 TABLET ORAL DAILY
Qty: 90 TABLET | Refills: 1 | Status: SHIPPED | OUTPATIENT
Start: 2018-02-10 | End: 2018-08-09

## 2018-04-05 ENCOUNTER — OFFICE VISIT (OUTPATIENT)
Dept: ENDOCRINOLOGY | Facility: CLINIC | Age: 68
End: 2018-04-05
Payer: MEDICARE

## 2018-04-05 VITALS
HEIGHT: 71 IN | WEIGHT: 236 LBS | HEART RATE: 69 BPM | BODY MASS INDEX: 33.04 KG/M2 | SYSTOLIC BLOOD PRESSURE: 153 MMHG | DIASTOLIC BLOOD PRESSURE: 83 MMHG

## 2018-04-05 DIAGNOSIS — E11.65 TYPE 2 DIABETES MELLITUS WITH HYPERGLYCEMIA, WITHOUT LONG-TERM CURRENT USE OF INSULIN (H): Primary | ICD-10-CM

## 2018-04-05 LAB — HBA1C MFR BLD: 6.7 % (ref 4.3–6)

## 2018-04-05 ASSESSMENT — PAIN SCALES - GENERAL: PAINLEVEL: NO PAIN (0)

## 2018-04-05 NOTE — MR AVS SNAPSHOT
After Visit Summary   4/5/2018    Mark Garces    MRN: [id number]           Patient Information     Date Of Birth          1950        Visit Information        Provider Department      4/5/2018 10:30 AM Elizabeth Guzmna PA-C [facility] Endocrinology        Today's Diagnoses     Type 2 diabetes mellitus with hyperglycemia, without long-term current use of insulin (H)    -  1       Follow-ups after your visit        Your next 10 appointments already scheduled     Oct 09, 2018 10:00 AM CDT   (Arrive by 9:45 AM)   RETURN DIABETES with KEYSAH Jiménez [facility] Endocrinology ([facility])    [address] [address] [phone]              Who to contact     Please call your clinic at [phone] to:    Ask questions about your health    Make or cancel appointments    Discuss your medicines    Learn about your test results    Speak to your doctor            Additional Information About Your Visit        Secured MailharJobzella Information     [employer]Fit gives you secure access to your electronic health record. If you see a primary care provider, you can also send messages to your care team and make appointments. If you have questions, please call your primary care clinic.  If you do not have a primary care provider, please call [phone] and they will assist you.      iFit is an electronic gateway that provides easy, online access to your medical records. With iFit, you can request a clinic appointment, read your test results, renew a prescription or communicate with your care team.     To access your existing account, please contact your [facility] or call [phone] for assistance.        Care EveryWhere ID     This is your Care EveryWhere ID. This could be used by other organizations to access your [place] medical records  [id number]        Your Vitals Were     Pulse Height BMI (Body Mass  "Index)             69 1.803 m (5' 11\") 32.92 kg/m2          Blood Pressure from Last 3 Encounters:   04/05/18 153/83   12/07/17 113/74   09/07/17 131/85    Weight from Last 3 Encounters:   04/05/18 107 kg (236 lb)   12/07/17 115.2 kg (253 lb 14.4 oz)   09/07/17 124.3 kg (274 lb)              Today, you had the following     No orders found for display       Primary Care Provider Office Phone # Fax #    Eloy JAMIA Anne, SPIKE [phone]       [address] [facility] [address]  [facility] [address]        Equal Access to Services     LAURA DESAI : Hola Harrison, wajosé miguel rahman, qaedda kaalmada dawson, bailee buckley . So [facility] [phone].    ATENCIÓN: Si habla español, tiene a brown disposición servicios gratuitos de asistencia lingüística. Llame al [phone].    We comply with applicable federal civil rights laws and [place] laws. We do not discriminate on the basis of race, color, national origin, age, disability, sex, sexual orientation, or gender identity.            Thank you!     Thank you for choosing [facility]  for your care. Our goal is always to provide you with excellent care. Hearing back from our patients is one way we can continue to improve our services. Please take a few minutes to complete the written survey that you may receive in the mail after your visit with us. Thank you!             Your Updated Medication List - Protect others around you: Learn how to safely use, store and throw away your medicines at www.disposemymeds.org.          This list is accurate as of 4/5/18 11:21 AM.  Always use your most recent med list.                   Brand Name Dispense Instructions for use Diagnosis    aspirin 81 MG tablet      Take 1 tablet by mouth daily.        Fish Oil 500 MG Caps      Take 2 capsules by mouth daily.        metFORMIN 500 MG tablet    GLUCOPHAGE    360 tablet    Take 2 tablets (1,000 mg) by mouth 2 times daily (with " meals)    Type 2 diabetes mellitus without complication (H)       ONETOUCH ULTRA test strip   Generic drug:  blood glucose monitoring      Reported on 3/7/2017        simvastatin 40 MG tablet    ZOCOR    90 tablet    Take 1 tablet (40 mg) by mouth daily    Diabetes mellitus (H)       sitagliptin 50 MG tablet    JANUVIA    90 tablet    Take 1 tablet (50 mg) by mouth daily    Type 2 diabetes mellitus with hyperglycemia, without long-term current use of insulin (H)

## 2018-04-05 NOTE — PROGRESS NOTES
HPI   Mark Garces is a 67 year old male with type 2 diabetes mellitus here today for follow up visit.   Pt's hx is significant for type 2 DM dx in 2007, obesity and past hx of Hepatitis C which has been treated successfully.  For his diabetes, he is currently taking Metformin 500 mg 2 tabs po BID and Januvia 50 mg daily.   He tolerates both meds well.    Pt's A1C is 6.7 %  today.  Pt's previous A1C was 6.9 %.   He did not bring his glucose meter to his visit today.  Pt states he is not checking his blood sugar at home because he does not like to stick himself with the lancets.  He also states he can not afford the strips.  On ROS today, he reports feeling well.  He has lost 50 lbs which he states has been intentional and he feels much better overall.  He reports less back, hip and right leg pain.    Pt denies frequent headaches, blurred vision,  n/v, SOB at rest, cough or chest pain.  No abd pain, diarrhea, dysuria, hematuria or foot ulcers.  Some numbness and pain intermittently in this right leg. He has hx chronic back issues.  No stool/urine incontinence.  He denies any numbness, tingling or pain in his left foot/leg.    DIABETES CARE:  Retinopathy: none; he is due for an Oph exam which he states he will schedule.  He would like to see an Oph closer to his home.  Nephropathy: none; urine microalbuminuria negative in 9/2017.  Neuropathy: no peripheral neuropathy; he does have some decrease sensor right foot.   Taking ASA: yes.  Lipids: LDL 64 in 9/2017 on Simvastatin.    ROS   Please see under HPI.     ALLERGIES:   No Known Allergies      Prescription Medications as of 4/5/2018             simvastatin (ZOCOR) 40 MG tablet Take 1 tablet (40 mg) by mouth daily    sitagliptin (JANUVIA) 50 MG tablet Take 1 tablet (50 mg) by mouth daily    metFORMIN (GLUCOPHAGE) 500 MG tablet Take 2 tablets (1,000 mg) by mouth 2 times daily (with meals)    aspirin 81 MG tablet Take 1 tablet by mouth daily.    Omega-3 Fatty Acids (FISH  "OIL) 500 MG CAPS Take 2 capsules by mouth daily.    glucose blood VI test strips (ONE TOUCH ULTRA TEST) strip Reported on 3/7/2017        Family Hx   No family hx of diabetes.    Personal Hx   Smoke: None at this time.  ETOH : None at this time.    PMH   1. Type 2 Diabetes Mellitus dx in 2007.   2. Hepatitis C; pt underwent tx for Hep C and his Hep C has cleared.  3. Obesity.  4. Past hx of nicotine use.   5. Hx of undescended testis s/p surgery in 1971.   6. Anemia while undergoing hep C treatment.    Physical Exam   General appearance:   Vitals:    04/05/18 1029 04/05/18 1056   BP: 159/77 153/83   Pulse: 78 69   Weight: 107 kg (236 lb)    Height: 1.803 m (5' 11\")      GENERAL: Pt in no apparent distress.  SKIN: No rash.  HEENT: PERRLA; fundi not examined.  NECK: No goiter.  LUNGS: Clear b/l.  CARDIAC: RRR.  ABDOMEN: Large and nontender.  EXTREMITIES: No pretibial edema b/l.  FEET: Warm, no ulcers and normal monofilamentous exam left foot. Some decrease sensory right foot.    Results   Creatinine   Date Value Ref Range Status   09/07/2017 0.89 0.66 - 1.25 mg/dL Final     GFR Estimate   Date Value Ref Range Status   09/07/2017 85 >60 mL/min/1.7m2 Final     Comment:     Non  GFR Calc     Hemoglobin A1C   Date Value Ref Range Status   07/17/2013 7.0 (A) 4.3 - 6.0 % Final     Potassium   Date Value Ref Range Status   09/07/2017 4.4 3.4 - 5.3 mmol/L Final     ALT   Date Value Ref Range Status   09/07/2017 18 0 - 70 U/L Final     AST   Date Value Ref Range Status   09/07/2017 9 0 - 45 U/L Final     TSH   Date Value Ref Range Status   09/07/2017 2.49 0.40 - 4.00 mU/L Final     T4 Free   Date Value Ref Range Status   09/07/2017 1.02 0.76 - 1.46 ng/dL Final         Cholesterol   Date Value Ref Range Status   09/07/2017 132 <200 mg/dL Final   04/06/2016 156 <200 mg/dL Final     HDL Cholesterol   Date Value Ref Range Status   09/07/2017 43 >39 mg/dL Final   04/06/2016 47 >39 mg/dL Final     LDL Cholesterol " Calculated   Date Value Ref Range Status   09/07/2017 64 <100 mg/dL Final     Comment:     Desirable:       <100 mg/dl   04/06/2016 87 <100 mg/dL Final     Comment:     Desirable:       <100 mg/dl     Triglycerides   Date Value Ref Range Status   09/07/2017 124 <150 mg/dL Final   04/06/2016 107 <150 mg/dL Final     Cholesterol/HDL Ratio   Date Value Ref Range Status   08/20/2014 2.8 0.0 - 5.0 Final   11/19/2013 3.7 0.0 - 5.0 Final     A1C      6.7   4/5/2018  A1C      6.9   12/7/2017  A1C      8.5   9/7/2017  A1C      7.7   3/7/2017  A1C      7.5   4/6/2016  A1C      7.4   7/28/2015  A1C      7.0   8/20/2014  A1C      6.8   3/18/2014  A1C      6.9  11/20/2013  A1C      7.0   7/17/2013  A1C      7.2   3/19/2013    ASSESSMENT/PLAN:     1. TYPE 2 DIABETES MELLITUS: Patient's A1C continues to improve and is 6.7 % today.  He has lost 50 lbs and is eating healthier, more active and feels much better.  I encouraged Mark to continue to make healthy food choices and remain active.  He is to remain on Januvia 50 mg daily and Metformin 1000 mg BID.   Mark does not check his blood sugar at home- he does not like the finger sticks and he states the strips cost too much money which he can not afford at this time.   Mark plans to schedule an Oph exam.  Feet are ok.  His urine microalbuminuria was negative in 9/2017 with a normal creat/GFR.  His TSH was normal in Sept 2017.  Pt had the flu vaccine this season.    2. HYPERLIPIDEMIA: Pt's LDL 64 in 9/2017.   He remains on Simvastatin.    3. OVERWEIGHT: Mark has done a nice job of losing 30 lbs. See under # 1 above.    4.  HX OF HEPATITIS C: Logan has completed his hep C treatment and states his hep C has clear.      5.  RIGHT FOOT NUMBNESS/TINGLING: I suggested he discuss this with his PCP.  This may be related to his chronic back issues.    6. Return to Endocrine Clinic to see me in 6 months.

## 2018-04-05 NOTE — LETTER
4/5/2018       RE: Mark Garces  [address] [place] [id number]     Dear Colleague,    Thank you for referring your patient, Mark Garces, to the [facility] ENDOCRINOLOGY at [facility]. Please see a copy of my visit note below.    HPI   Mark Garces is a 67 year old male with type 2 diabetes mellitus here today for follow up visit.   Pt's hx is significant for type 2 DM dx in 2007, obesity and past hx of Hepatitis C which has been treated successfully.  For his diabetes, he is currently taking Metformin 500 mg 2 tabs po BID and Januvia 50 mg daily.   He tolerates both meds well.    Pt's A1C is 6.7 %  today.  Pt's previous A1C was 6.9 %.   He did not bring his glucose meter to his visit today.  Pt states he is not checking his blood sugar at home because he does not like to stick himself with the lancets.  He also states he can not afford the strips.  On ROS today, he reports feeling well.  He has lost 50 lbs which he states has been intentional and he feels much better overall.  He reports less back, hip and right leg pain.    Pt denies frequent headaches, blurred vision,  n/v, SOB at rest, cough or chest pain.  No abd pain, diarrhea, dysuria, hematuria or foot ulcers.  Some numbness and pain intermittently in this right leg. He has hx chronic back issues.  No stool/urine incontinence.  He denies any numbness, tingling or pain in his left foot/leg.    DIABETES CARE:  Retinopathy: none; he is due for an Oph exam which he states he will schedule.  He would like to see an Oph closer to his home.  Nephropathy: none; urine microalbuminuria negative in 9/2017.  Neuropathy: no peripheral neuropathy; he does have some decrease sensor right foot.   Taking ASA: yes.  Lipids: LDL 64 in 9/2017 on Simvastatin.    ROS   Please see under HPI.     ALLERGIES:   No Known Allergies      Prescription Medications as of 4/5/2018             simvastatin (ZOCOR) 40 MG tablet Take 1 tablet (40  "mg) by mouth daily    sitagliptin (JANUVIA) 50 MG tablet Take 1 tablet (50 mg) by mouth daily    metFORMIN (GLUCOPHAGE) 500 MG tablet Take 2 tablets (1,000 mg) by mouth 2 times daily (with meals)    aspirin 81 MG tablet Take 1 tablet by mouth daily.    Omega-3 Fatty Acids (FISH OIL) 500 MG CAPS Take 2 capsules by mouth daily.    glucose blood VI test strips (ONE TOUCH ULTRA TEST) strip Reported on 3/7/2017        Family Hx   No family hx of diabetes.    Personal Hx   Smoke: None at this time.  ETOH : None at this time.    PMH   1. Type 2 Diabetes Mellitus dx in 2007.   2. Hepatitis C; pt underwent tx for Hep C and his Hep C has cleared.  3. Obesity.  4. Past hx of nicotine use.   5. Hx of undescended testis s/p surgery in 1971.   6. Anemia while undergoing hep C treatment.    Physical Exam   General appearance:   Vitals:    04/05/18 1029 04/05/18 1056   BP: 159/77 153/83   Pulse: 78 69   Weight: 107 kg (236 lb)    Height: 1.803 m (5' 11\")      GENERAL: Pt in no apparent distress.  SKIN: No rash.  HEENT: PERRLA; fundi not examined.  NECK: No goiter.  LUNGS: Clear b/l.  CARDIAC: RRR.  ABDOMEN: Large and nontender.  EXTREMITIES: No pretibial edema b/l.  FEET: Warm, no ulcers and normal monofilamentous exam left foot. Some decrease sensory right foot.    Results   Creatinine   Date Value Ref Range Status   09/07/2017 0.89 0.66 - 1.25 mg/dL Final     GFR Estimate   Date Value Ref Range Status   09/07/2017 85 >60 mL/min/1.7m2 Final     Comment:     Non  GFR Calc     Hemoglobin A1C   Date Value Ref Range Status   07/17/2013 7.0 (A) 4.3 - 6.0 % Final     Potassium   Date Value Ref Range Status   09/07/2017 4.4 3.4 - 5.3 mmol/L Final     ALT   Date Value Ref Range Status   09/07/2017 18 0 - 70 U/L Final     AST   Date Value Ref Range Status   09/07/2017 9 0 - 45 U/L Final     TSH   Date Value Ref Range Status   09/07/2017 2.49 0.40 - 4.00 mU/L Final     T4 Free   Date Value Ref Range Status   09/07/2017 1.02 " 0.76 - 1.46 ng/dL Final         Cholesterol   Date Value Ref Range Status   09/07/2017 132 <200 mg/dL Final   04/06/2016 156 <200 mg/dL Final     HDL Cholesterol   Date Value Ref Range Status   09/07/2017 43 >39 mg/dL Final   04/06/2016 47 >39 mg/dL Final     LDL Cholesterol Calculated   Date Value Ref Range Status   09/07/2017 64 <100 mg/dL Final     Comment:     Desirable:       <100 mg/dl   04/06/2016 87 <100 mg/dL Final     Comment:     Desirable:       <100 mg/dl     Triglycerides   Date Value Ref Range Status   09/07/2017 124 <150 mg/dL Final   04/06/2016 107 <150 mg/dL Final     Cholesterol/HDL Ratio   Date Value Ref Range Status   08/20/2014 2.8 0.0 - 5.0 Final   11/19/2013 3.7 0.0 - 5.0 Final     A1C      6.7   4/5/2018  A1C      6.9   12/7/2017  A1C      8.5   9/7/2017  A1C      7.7   3/7/2017  A1C      7.5   4/6/2016  A1C      7.4   7/28/2015  A1C      7.0   8/20/2014  A1C      6.8   3/18/2014  A1C      6.9  11/20/2013  A1C      7.0   7/17/2013  A1C      7.2   3/19/2013    ASSESSMENT/PLAN:     1. TYPE 2 DIABETES MELLITUS: Patient's A1C continues to improve and is 6.7 % today.  He has lost 50 lbs and is eating healthier, more active and feels much better.  I encouraged Mark to continue to make healthy food choices and remain active.  He is to remain on Januvia 50 mg daily and Metformin 1000 mg BID.   Mark does not check his blood sugar at home- he does not like the finger sticks and he states the strips cost too much money which he can not afford at this time.   Mark plans to schedule an Oph exam.  Feet are ok.  His urine microalbuminuria was negative in 9/2017 with a normal creat/GFR.  His TSH was normal in Sept 2017.  Pt had the flu vaccine this season.    2. HYPERLIPIDEMIA: Pt's LDL 64 in 9/2017.   He remains on Simvastatin.    3. OVERWEIGHT: Mark has done a nice job of losing 30 lbs. See under # 1 above.    4.  HX OF HEPATITIS C: Logan has completed his hep C treatment and states his hep C has  clear.      5.  RIGHT FOOT NUMBNESS/TINGLING: I suggested he discuss this with his PCP.  This may be related to his chronic back issues.    6. Return to Endocrine Clinic to see me in 6 months.        Again, thank you for allowing me to participate in the care of your patient.      Sincerely,    Elizabeth Guzman PA-C

## 2018-08-03 DIAGNOSIS — E11.9 TYPE 2 DIABETES MELLITUS WITHOUT COMPLICATION (H): ICD-10-CM

## 2018-08-04 NOTE — TELEPHONE ENCOUNTER
metFORMIN (GLUCOPHAGE) 500 MG tablet     Last Written Prescription Date:  8/9/17  Last Fill Quantity: 360,   # refills: 3  Last Office Visit :   4/5/18  Future Office visit: 10/9/18      Routing refill request to provider for review/approval because:  bp > 140/90

## 2018-08-09 DIAGNOSIS — E11.9 DIABETES MELLITUS (H): ICD-10-CM

## 2018-08-09 RX ORDER — SIMVASTATIN 40 MG
40 TABLET ORAL DAILY
Qty: 90 TABLET | Refills: 2 | Status: SHIPPED | OUTPATIENT
Start: 2018-08-09 | End: 2019-08-23

## 2018-10-09 ENCOUNTER — OFFICE VISIT (OUTPATIENT)
Dept: ENDOCRINOLOGY | Facility: CLINIC | Age: 68
End: 2018-10-09
Payer: MEDICARE

## 2018-10-09 VITALS
HEART RATE: 81 BPM | HEIGHT: 71 IN | DIASTOLIC BLOOD PRESSURE: 76 MMHG | WEIGHT: 240 LBS | SYSTOLIC BLOOD PRESSURE: 126 MMHG | BODY MASS INDEX: 33.6 KG/M2

## 2018-10-09 DIAGNOSIS — E11.65 TYPE 2 DIABETES MELLITUS WITH HYPERGLYCEMIA, WITHOUT LONG-TERM CURRENT USE OF INSULIN (H): Primary | ICD-10-CM

## 2018-10-09 LAB — HBA1C MFR BLD: 6.7 % (ref 4.3–6)

## 2018-10-09 ASSESSMENT — PAIN SCALES - GENERAL: PAINLEVEL: NO PAIN (0)

## 2018-10-09 NOTE — MR AVS SNAPSHOT
After Visit Summary   10/9/2018    Mark Garces    MRN: [id number]           Patient Information     Date Of Birth          1950        Visit Information        Provider Department      10/9/2018 10:00 AM Elizabeth Guzman PA-C [facility] Endocrinology        Today's Diagnoses     Type 2 diabetes mellitus with hyperglycemia, without long-term current use of insulin (H)    -  1       Follow-ups after your visit        Your next 10 appointments already scheduled     Apr 09, 2019  9:45 AM CDT   LAB with  LAB   [facility] ([facility])    [address] [place] [id number] [phone]           Please do not eat 10-12 hours before your appointment if you are coming in fasting for labs on lipids, cholesterol, or glucose (sugar). This does not apply to pregnant women. Water, hot tea and black coffee (with nothing added) are okay. Do not drink other fluids, diet soda or chew gum.            Apr 09, 2019 10:30 AM CDT   (Arrive by 10:15 AM)   RETURN DIABETES with KEYSHA Jiménez [facility] Endocrinology ([facility])    [address] [place] [address] [phone]              Who to contact     Please call your clinic at [phone] to:    Ask questions about your health    Make or cancel appointments    Discuss your medicines    Learn about your test results    Speak to your doctor            Additional Information About Your Visit        [facility] Information     [employer] gives you secure access to your electronic health record. If you see a primary care provider, you can also send messages to your care team and make appointments. If you have questions, please call your primary care clinic.  If you do not have a primary care provider, please call [phone] and they will assist you.      [employer] is an electronic gateway that provides easy, online access to your medical records.  "With MyChart, you can request a clinic appointment, read your test results, renew a prescription or communicate with your care team.     To access your existing account, please contact your [facility] or call [phone] for assistance.        Care EveryWhere ID     This is your Care EveryWhere ID. This could be used by other organizations to access your [facility] medical records  [id number]        Your Vitals Were     Pulse Height BMI (Body Mass Index)             81 1.803 m (5' 11\") 33.47 kg/m2          Blood Pressure from Last 3 Encounters:   10/09/18 126/76   04/05/18 153/83   12/07/17 113/74    Weight from Last 3 Encounters:   10/09/18 108.9 kg (240 lb)   04/05/18 107 kg (236 lb)   12/07/17 115.2 kg (253 lb 14.4 oz)              We Performed the Following     Hemoglobin A1c POCT          Today's Medication Changes          These changes are accurate as of 10/9/18  3:39 PM.  If you have any questions, ask your nurse or doctor.               These medicines have changed or have updated prescriptions.        Dose/Directions    metFORMIN 500 MG tablet   Commonly known as:  GLUCOPHAGE   This may have changed:  Another medication with the same name was removed. Continue taking this medication, and follow the directions you see here.   Changed by:  Elizabeth Guzman PA-C        Dose:  500 mg   Take 1 tablet (500 mg) by mouth 2 times daily (with meals)   Quantity:  360 tablet   Refills:  3                Primary Care Provider Office Phone # Fax #    SPIKE Max [phone]       [address] [place] [phone]  [facility] [id number]        Equal Access to Services     [facility]: Hadii sirena loyola hadasho Soomaali, waaxda luqadaha, qaybta kaalmada dawson, bailee galdamez. So [facility] [phone].    ATENCIÓN: Si habla español, tiene a brown disposición servicios gratuitos de asistencia lingüística. Llame al [phone].    We comply with " applicable federal civil rights laws and [place] laws. We do not discriminate on the basis of race, color, national origin, age, disability, sex, sexual orientation, or gender identity.            Thank you!     Thank you for choosing [facility] ENDOCRINOLOGY  for your care. Our goal is always to provide you with excellent care. Hearing back from our patients is one way we can continue to improve our services. Please take a few minutes to complete the written survey that you may receive in the mail after your visit with us. Thank you!             Your Updated Medication List - Protect others around you: Learn how to safely use, store and throw away your medicines at www.disposemymeds.org.          This list is accurate as of 10/9/18  3:39 PM.  Always use your most recent med list.                   Brand Name Dispense Instructions for use Diagnosis    aspirin 81 MG tablet      Take 1 tablet by mouth daily.        Fish Oil 500 MG Caps      Take 2 capsules by mouth daily.        JANUVIA 50 MG tablet   Generic drug:  sitagliptin     90 tablet    Take 1 tablet (50 mg) by mouth daily    Type 2 diabetes mellitus with hyperglycemia, without long-term current use of insulin (H)       metFORMIN 500 MG tablet    GLUCOPHAGE    360 tablet    Take 1 tablet (500 mg) by mouth 2 times daily (with meals)        ONETOUCH ULTRA test strip   Generic drug:  blood glucose monitoring      Reported on 3/7/2017        simvastatin 40 MG tablet    ZOCOR    90 tablet    Take 1 tablet (40 mg) by mouth daily    Diabetes mellitus (H)

## 2018-10-09 NOTE — LETTER
10/9/2018       RE: Mark Garces  [address] [place] [id number]     Dear Colleague,    Thank you for referring your patient, Mark Garces, to the [facility] ENDOCRINOLOGY at [facility]. Please see a copy of my visit note below.    HPI   Mark Garces is a 67 year old male with type 2 diabetes mellitus here today for follow up visit.   Pt's hx is significant for type 2 DM dx in 2007, obesity and past hx of Hepatitis C which has been treated successfully.  For his diabetes, he is currently taking Metformin 500 mg 2 tabs po BID and Januvia 50 mg daily.   He tolerates both meds well.    Pt's A1C is 6.7 %  today.  Pt's previous A1C was 6.7 %.   He did not bring his glucose meter to his visit today.  Pt states he is not checking his blood sugar at home because he does not like to stick himself with the lancets.  He also states he can not afford the strips.  On ROS today, he reports feeling well.  He has lost weight and is feeling well.  He reports less back, hip and right leg pain.    Pt denies frequent headaches, blurred vision,  n/v, SOB at rest, cough or chest pain.  No abd pain, diarrhea, dysuria, hematuria or foot ulcers.  Some numbness and pain intermittently in this right leg. He has hx chronic back issues.  No stool/urine incontinence.  He denies any numbness, tingling or pain in his left foot/leg.    DIABETES CARE:  Retinopathy: none; he is due for an Oph exam which he states he will schedule.  He would like to see an Oph closer to his home.  Nephropathy: none; urine microalbuminuria negative in 9/2017.  Neuropathy: no peripheral neuropathy; he does have some decrease sensor right foot.   Taking ASA: yes.  Lipids: LDL 64 in 9/2017 on Simvastatin.    ROS   Please see under HPI.     ALLERGIES:   No Known Allergies      Prescription Medications as of 10/9/2018             aspirin 81 MG tablet Take 1 tablet by mouth daily.    metFORMIN (GLUCOPHAGE) 500 MG tablet Take 1  "tablet (500 mg) by mouth 2 times daily (with meals)    Omega-3 Fatty Acids (FISH OIL) 500 MG CAPS Take 2 capsules by mouth daily.    simvastatin (ZOCOR) 40 MG tablet Take 1 tablet (40 mg) by mouth daily    sitagliptin (JANUVIA) 50 MG tablet Take 1 tablet (50 mg) by mouth daily    glucose blood VI test strips (ONE TOUCH ULTRA TEST) strip Reported on 3/7/2017        Family Hx   No family hx of diabetes.    Personal Hx   Smoke: None at this time.  ETOH : None at this time.    PMH   1. Type 2 Diabetes Mellitus dx in 2007.   2. Hepatitis C; pt underwent tx for Hep C and his Hep C has cleared.  3. Obesity.  4. Past hx of nicotine use.   5. Hx of undescended testis s/p surgery in 1971.   6. Anemia while undergoing hep C treatment.    Physical Exam   General appearance:   Vitals:    10/09/18 0954   BP: 126/76   Pulse: 81   Weight: 108.9 kg (240 lb)   Height: 1.803 m (5' 11\")     GENERAL: Pt in no apparent distress.  SKIN: No rash.  HEENT: PERRLA; fundi not examined.  NECK: No goiter.  LUNGS: Clear b/l.  CARDIAC: RRR.  ABDOMEN: Large and nontender.  EXTREMITIES: No pretibial edema b/l.  FEET: Warm, no ulcers and normal monofilamentous exam left foot. Some decrease sensory right foot.    Results   Creatinine   Date Value Ref Range Status   09/07/2017 0.89 0.66 - 1.25 mg/dL Final     GFR Estimate   Date Value Ref Range Status   09/07/2017 85 >60 mL/min/1.7m2 Final     Comment:     Non  GFR Calc     Hemoglobin A1C   Date Value Ref Range Status   07/17/2013 7.0 (A) 4.3 - 6.0 % Final     Potassium   Date Value Ref Range Status   09/07/2017 4.4 3.4 - 5.3 mmol/L Final     ALT   Date Value Ref Range Status   09/07/2017 18 0 - 70 U/L Final     AST   Date Value Ref Range Status   09/07/2017 9 0 - 45 U/L Final     TSH   Date Value Ref Range Status   09/07/2017 2.49 0.40 - 4.00 mU/L Final     T4 Free   Date Value Ref Range Status   09/07/2017 1.02 0.76 - 1.46 ng/dL Final         Cholesterol   Date Value Ref Range Status "   09/07/2017 132 <200 mg/dL Final   04/06/2016 156 <200 mg/dL Final     HDL Cholesterol   Date Value Ref Range Status   09/07/2017 43 >39 mg/dL Final   04/06/2016 47 >39 mg/dL Final     LDL Cholesterol Calculated   Date Value Ref Range Status   09/07/2017 64 <100 mg/dL Final     Comment:     Desirable:       <100 mg/dl   04/06/2016 87 <100 mg/dL Final     Comment:     Desirable:       <100 mg/dl     Triglycerides   Date Value Ref Range Status   09/07/2017 124 <150 mg/dL Final   04/06/2016 107 <150 mg/dL Final     Cholesterol/HDL Ratio   Date Value Ref Range Status   08/20/2014 2.8 0.0 - 5.0 Final   11/19/2013 3.7 0.0 - 5.0 Final     A1C      6.7   10/9/2018  A1C      6.7   4/5/2018  A1C      6.9   12/7/2017  A1C      8.5   9/7/2017  A1C      7.7   3/7/2017  A1C      7.5   4/6/2016  A1C      7.4   7/28/2015  A1C      7.0   8/20/2014  A1C      6.8   3/18/2014  A1C      6.9  11/20/2013  A1C      7.0   7/17/2013  A1C      7.2   3/19/2013    ASSESSMENT/PLAN:     1. TYPE 2 DIABETES MELLITUS: Patient's A1C is 6.7 5 today.   He has lost approximately 50 lbs and is eating healthier, more active and feels much better.  I encouraged Mark to continue to make healthy food choices and remain active.  He is to remain on Januvia 50 mg daily and Metformin 1000 mg BID.   Mark does not check his blood sugar at home- he does not like the finger sticks and he states the strips cost too much money which he can not afford at this time.  He is not interested in using the Freestyle Ama device/sensors.  Again, I reminded pt to schedule an Oph exam.  Feet are ok.  His urine microalbuminuria was negative in 9/2017 with a normal creat/GFR.  His TSH was normal in Sept 2017.    2. HYPERLIPIDEMIA: Pt's LDL 64 in 9/2017.   He remains on Simvastatin.    3. OVERWEIGHT: Mark has done a nice job of losing weight.     4.  HX OF HEPATITIS C: Pt has completed his hep C treatment and states his hep C has clear.      5.  RIGHT FOOT  NUMBNESS/TINGLING: I suggested he discuss this with his PCP.  This may be related to his chronic back issues.    6. Return to Endocrine Clinic to see me in 6 months.  He would like to wait and check labs next visit.      Again, thank you for allowing me to participate in the care of your patient.      Sincerely,    Elizabeth Guzman PA-C

## 2018-12-17 DIAGNOSIS — E11.65 TYPE 2 DIABETES MELLITUS WITH HYPERGLYCEMIA, WITHOUT LONG-TERM CURRENT USE OF INSULIN (H): Primary | ICD-10-CM

## 2018-12-17 NOTE — TELEPHONE ENCOUNTER
metFORMIN (GLUCOPHAGE) 500 MG tablet      Take 2 tablets (1,000 mg) by mouth 2 times daily (with meals)    Last Written Prescription Date: listed Historical  Take 1 tablet (500 mg) by mouth 2 times daily (with meals) - Oral    Last Office Visit : 10-9-18  Future Office visit:  4-9-19  Clinic note:He is to remain on Januvia 50 mg daily and Metformin 1000 mg BID.      Routing refill request to provider for review/approval because:  Overdue lab: LDL, Micro Albumin  Listed as historical- different dosage

## 2019-01-18 ENCOUNTER — TELEPHONE (OUTPATIENT)
Dept: ENDOCRINOLOGY | Facility: CLINIC | Age: 69
End: 2019-01-18

## 2019-01-18 DIAGNOSIS — E11.65 TYPE 2 DIABETES MELLITUS WITH HYPERGLYCEMIA, WITHOUT LONG-TERM CURRENT USE OF INSULIN (H): ICD-10-CM

## 2019-01-18 NOTE — TELEPHONE ENCOUNTER
Order from 10-9-18 was never transmitted to the pharmacy. Order sent today.  Patient was called informed that the order has been sent .    Kathleen M Doege RN

## 2019-01-18 NOTE — TELEPHONE ENCOUNTER
[facility] Call Center    Phone Message    May a detailed message be left on voicemail: yes    Reason for Call: Medication Refill Request    Has the patient contacted the pharmacy for the refill? Yes   Name of medication being requested: sitagliptin (JANUVIA) 50 MG tablet  Provider who prescribed the medication: Megan  Pharmacy: [facility] [address] [place] [address] AT [place] [id number]    Date medication is needed: ASAP - Pharmacy told him there are no refills on current script and that they have sent requests for the past week and have heard nothing back.         Action Taken: Message routed to:  Other: P Med Refills Team

## 2019-01-29 DIAGNOSIS — E11.65 TYPE 2 DIABETES MELLITUS WITH HYPERGLYCEMIA, WITHOUT LONG-TERM CURRENT USE OF INSULIN (H): ICD-10-CM

## 2019-03-18 DIAGNOSIS — E11.65 TYPE 2 DIABETES MELLITUS WITH HYPERGLYCEMIA, WITHOUT LONG-TERM CURRENT USE OF INSULIN (H): ICD-10-CM

## 2019-03-19 NOTE — TELEPHONE ENCOUNTER
metFORMIN (GLUCOPHAGE) 500 MG tablet      Last Written Prescription Date:  12-18-18  Last Fill Quantity: 360,   # refills: 0  Last Office Visit : 10-9-18  Future Office visit:  4-9-19    Routing refill request to provider for review/approval because:  Overdue labs: LDL, Micro Albumin

## 2019-04-09 ENCOUNTER — OFFICE VISIT (OUTPATIENT)
Dept: ENDOCRINOLOGY | Facility: CLINIC | Age: 69
End: 2019-04-09
Payer: MEDICARE

## 2019-04-09 ENCOUNTER — APPOINTMENT (OUTPATIENT)
Dept: LAB | Facility: CLINIC | Age: 69
End: 2019-04-09
Payer: MEDICARE

## 2019-04-09 VITALS
SYSTOLIC BLOOD PRESSURE: 146 MMHG | BODY MASS INDEX: 34.71 KG/M2 | HEIGHT: 71 IN | WEIGHT: 247.9 LBS | DIASTOLIC BLOOD PRESSURE: 80 MMHG | HEART RATE: 75 BPM

## 2019-04-09 DIAGNOSIS — E11.65 TYPE 2 DIABETES MELLITUS WITH HYPERGLYCEMIA, WITHOUT LONG-TERM CURRENT USE OF INSULIN (H): ICD-10-CM

## 2019-04-09 DIAGNOSIS — E11.65 TYPE 2 DIABETES MELLITUS WITH HYPERGLYCEMIA, WITHOUT LONG-TERM CURRENT USE OF INSULIN (H): Primary | ICD-10-CM

## 2019-04-09 LAB
ALT SERPL W P-5'-P-CCNC: 19 U/L (ref 0–70)
AST SERPL W P-5'-P-CCNC: 9 U/L (ref 0–45)
CHOLEST SERPL-MCNC: 152 MG/DL
CK SERPL-CCNC: 60 U/L (ref 30–300)
CREAT SERPL-MCNC: 0.76 MG/DL (ref 0.66–1.25)
CREAT UR-MCNC: 38 MG/DL
GFR SERPL CREATININE-BSD FRML MDRD: >90 ML/MIN/{1.73_M2}
HBA1C MFR BLD: 6.8 % (ref 4.3–6)
HDLC SERPL-MCNC: 55 MG/DL
LDLC SERPL CALC-MCNC: 77 MG/DL
MICROALBUMIN UR-MCNC: 10 MG/L
MICROALBUMIN/CREAT UR: 25.2 MG/G CR (ref 0–17)
NONHDLC SERPL-MCNC: 96 MG/DL
POTASSIUM SERPL-SCNC: 4 MMOL/L (ref 3.4–5.3)
TRIGL SERPL-MCNC: 96 MG/DL
TSH SERPL DL<=0.005 MIU/L-ACNC: 2.72 MU/L (ref 0.4–4)

## 2019-04-09 ASSESSMENT — MIFFLIN-ST. JEOR: SCORE: 1921.6

## 2019-04-09 ASSESSMENT — PAIN SCALES - GENERAL: PAINLEVEL: NO PAIN (0)

## 2019-04-09 NOTE — PROGRESS NOTES
HPI   Mark Garces is a 68 year old male with type 2 diabetes mellitus here today for follow up visit.   Pt's hx is significant for type 2 DM dx in 2007, obesity and past hx of Hepatitis C which has been treated successfully.  For his diabetes, he is currently taking Metformin 500 mg 2 tabs po BID and Januvia 50 mg daily.   He tolerates both meds well.    Pt's A1C is 6.8 %  today.  Pt's previous A1C was 6.7 %.   He did not bring his glucose meter to his visit today.  Pt states he is not checking his blood sugar at home because he does not like to stick himself with the lancets.  He also states he can not afford the strips. Pt is not interested in the Freestyle Ama device/sensor.  On ROS today, he reports feeling well.  He has gained some weight over the winter months.  He reports less back, hip and right leg pain.    Pt denies frequent headaches, blurred vision,  n/v, SOB at rest, cough or chest pain.  No abd pain, diarrhea, dysuria, hematuria or foot ulcers.  Some numbness and pain intermittently in this right leg. He has hx chronic back issues.  No stool/urine incontinence.  He denies any numbness, tingling or pain in his left foot/leg.    DIABETES CARE:  Retinopathy: none; he is due for an Oph exam which he states he will schedule.  He would like to see an Oph closer to his home.  Nephropathy: none; urine microalbuminuria has been negative in the past and is + today. Will recheck next visit and if +, start an ace or ARB.  Neuropathy: no peripheral neuropathy; he does have some decrease sensor right foot.   Taking ASA: yes.  Lipids: LDL 77 today on Simvastatin.    ROS   Please see under HPI.     ALLERGIES:   No Known Allergies      Prescription Medications as of 4/9/2019       Rx Number Disp Refills Start End Last Dispensed Date Next Fill Date Owning Pharmacy    aspirin 81 MG tablet            Sig: Take 1 tablet by mouth daily.    Class: Historical    Route: Oral    glucose blood VI test strips (ONE TOUCH ULTRA  "TEST) strip            Sig: Reported on 3/7/2017    Class: Historical    metFORMIN (GLUCOPHAGE) 500 MG tablet  360 tablet 0 3/20/2019    [facility] [address] [place] [address] [address] AT [place] [address]    Sig: Take 2 tablets (1,000 mg) by mouth 2 times daily (with meals)    Class: E-Prescribe    Route: Oral    Omega-3 Fatty Acids (FISH OIL) 500 MG CAPS            Sig: Take 2 capsules by mouth daily.    Class: Historical    Route: Oral    simvastatin (ZOCOR) 40 MG tablet  90 tablet 2 8/9/2018    [facility] [address] [place] [address] [address] AT [place] [address]    Sig: Take 1 tablet (40 mg) by mouth daily    Class: E-Prescribe    Route: Oral    sitagliptin (JANUVIA) 50 MG tablet  90 tablet 3 1/29/2019    [facility] [place] [address] [address]  AT [place] [address]    Sig: Take 1 tablet (50 mg) by mouth daily    Class: E-Prescribe    Route: Oral        Family Hx   No family hx of diabetes.    Personal Hx   Smoke: None at this time.  ETOH : None at this time.    PMH   1. Type 2 Diabetes Mellitus dx in 2007.   2. Hepatitis C; pt underwent tx for Hep C and his Hep C has cleared.  3. Obesity.  4. Past hx of nicotine use.   5. Hx of undescended testis s/p surgery in 1971.   6. Anemia while undergoing hep C treatment.    Physical Exam   General appearance:   Vitals:    04/09/19 1035   BP: 146/80   Pulse: 75   Weight: 112.4 kg (247 lb 14.4 oz)   Height: 1.803 m (5' 11\")     GENERAL: Pt in no apparent distress.  SKIN: No rash.  HEENT: PERRLA; fundi not examined.  NECK: No goiter.  LUNGS: Clear b/l.  CARDIAC: RRR.  ABDOMEN: Large and nontender.  EXTREMITIES: No pretibial edema b/l.  FEET: Warm, no ulcers and normal monofilamentous exam left foot. Some decrease sensory right foot.    Results   Creatinine   Date Value Ref Range Status   04/09/2019 0.76 0.66 - 1.25 mg/dL Final     GFR Estimate   Date Value Ref Range Status   04/09/2019 >90 " >60 mL/min/[1.73_m2] Final     Comment:     Non  GFR Calc  Starting 12/18/2018, serum creatinine based estimated GFR (eGFR) will be   calculated using the Chronic Kidney Disease Epidemiology Collaboration   (CKD-EPI) equation.       Hemoglobin A1C   Date Value Ref Range Status   07/17/2013 7.0 (A) 4.3 - 6.0 % Final     Potassium   Date Value Ref Range Status   04/09/2019 4.0 3.4 - 5.3 mmol/L Final     ALT   Date Value Ref Range Status   04/09/2019 19 0 - 70 U/L Final     AST   Date Value Ref Range Status   04/09/2019 9 0 - 45 U/L Final     TSH   Date Value Ref Range Status   04/09/2019 2.72 0.40 - 4.00 mU/L Final     T4 Free   Date Value Ref Range Status   09/07/2017 1.02 0.76 - 1.46 ng/dL Final         Cholesterol   Date Value Ref Range Status   04/09/2019 152 <200 mg/dL Final   09/07/2017 132 <200 mg/dL Final     HDL Cholesterol   Date Value Ref Range Status   04/09/2019 55 >39 mg/dL Final   09/07/2017 43 >39 mg/dL Final     LDL Cholesterol Calculated   Date Value Ref Range Status   04/09/2019 77 <100 mg/dL Final     Comment:     Desirable:       <100 mg/dl   09/07/2017 64 <100 mg/dL Final     Comment:     Desirable:       <100 mg/dl     Triglycerides   Date Value Ref Range Status   04/09/2019 96 <150 mg/dL Final   09/07/2017 124 <150 mg/dL Final     Cholesterol/HDL Ratio   Date Value Ref Range Status   08/20/2014 2.8 0.0 - 5.0 Final   11/19/2013 3.7 0.0 - 5.0 Final     A1C      6.8    4/9/2019  A1C      6.7   10/9/2018  A1C      6.7   4/5/2018  A1C      6.9   12/7/2017  A1C      8.5   9/7/2017  A1C      7.7   3/7/2017  A1C      7.5   4/6/2016  A1C      7.4   7/28/2015  A1C      7.0   8/20/2014  A1C      6.8   3/18/2014  A1C      6.9  11/20/2013  A1C      7.0   7/17/2013  A1C      7.2   3/19/2013    ASSESSMENT/PLAN:     1. TYPE 2 DIABETES MELLITUS: Patient's A1C is 6.8 %  today.   He has lost approximately 40 lbs and is eating healthier and plans to increase his activity now that the weather is  karinar.  I encouraged Mark to continue to make healthy food choices and walk daily.  He is to remain on Januvia 50 mg daily and Metformin 1000 mg BID.   Mark does not check his blood sugar at home- he does not like the finger sticks and he states the strips cost too much money which he can not afford at this time.  He is not interested in using the Freestyle Ama device/sensors.  Again, I reminded pt to schedule an Oph exam.  Feet are ok.  His urine microalbuminuria has been negative in the past and is + today. Will recheck urine microalbuminuria next visit and if + will start low dose ace or ARB.  Pt's creat is 0.76 with GFR > 90 mL/min today.  His TSH is normal today.    2. HYPERLIPIDEMIA: Pt's LDL 77 today.  He remains on Simvastatin.    3. OVERWEIGHT: Mark has done a nice job of losing weight.  See under # 1 above.     4.  HX OF HEPATITIS C: Pt has completed his hep C treatment and states his hep C has clear.      5. Return to Endocrine Clinic to see me in 6 months.

## 2019-04-09 NOTE — PATIENT INSTRUCTIONS
1. Please schedule eye appointment.  2. Labs today.  3. See me in 6 months.  Elizabeth Guzman PA-C

## 2019-04-09 NOTE — LETTER
4/9/2019       RE: Mark Garces  [address] [place] [phone]     Dear Colleague,    Thank you for referring your patient, Mark Garces, to the [facility] ENDOCRINOLOGY at [facility]. Please see a copy of my visit note below.    HPI   Mark Garces is a 68 year old male with type 2 diabetes mellitus here today for follow up visit.   Pt's hx is significant for type 2 DM dx in 2007, obesity and past hx of Hepatitis C which has been treated successfully.  For his diabetes, he is currently taking Metformin 500 mg 2 tabs po BID and Januvia 50 mg daily.   He tolerates both meds well.    Pt's A1C is 6.8 %  today.  Pt's previous A1C was 6.7 %.   He did not bring his glucose meter to his visit today.  Pt states he is not checking his blood sugar at home because he does not like to stick himself with the lancets.  He also states he can not afford the strips. Pt is not interested in the Freestyle Ama device/sensor.  On ROS today, he reports feeling well.  He has gained some weight over the winter months.  He reports less back, hip and right leg pain.    Pt denies frequent headaches, blurred vision,  n/v, SOB at rest, cough or chest pain.  No abd pain, diarrhea, dysuria, hematuria or foot ulcers.  Some numbness and pain intermittently in this right leg. He has hx chronic back issues.  No stool/urine incontinence.  He denies any numbness, tingling or pain in his left foot/leg.    DIABETES CARE:  Retinopathy: none; he is due for an Oph exam which he states he will schedule.  He would like to see an Oph closer to his home.  Nephropathy: none; urine microalbuminuria has been negative in the past and is + today. Will recheck next visit and if +, start an ace or ARB.  Neuropathy: no peripheral neuropathy; he does have some decrease sensor right foot.   Taking ASA: yes.  Lipids: LDL 77 today on Simvastatin.    ROS   Please see under HPI.     ALLERGIES:   No Known  "Allergies      Prescription Medications as of 4/9/2019       Rx Number Disp Refills Start End Last Dispensed Date Next Fill Date Owning Pharmacy    aspirin 81 MG tablet            Sig: Take 1 tablet by mouth daily.    Class: Historical    Route: Oral    glucose blood VI test strips (ONE TOUCH ULTRA TEST) strip            Sig: Reported on 3/7/2017    Class: Historical    metFORMIN (GLUCOPHAGE) 500 MG tablet  360 tablet 0 3/20/2019    [facility] [address] [place] [address] [address] AT [place] [address]    Sig: Take 2 tablets (1,000 mg) by mouth 2 times daily (with meals)    Class: E-Prescribe    Route: Oral    Omega-3 Fatty Acids (FISH OIL) 500 MG CAPS            Sig: Take 2 capsules by mouth daily.    Class: Historical    Route: Oral    simvastatin (ZOCOR) 40 MG tablet  90 tablet 2 8/9/2018    [facility] [address] [place] [address] [address] AT [place] [address]    Sig: Take 1 tablet (40 mg) by mouth daily    Class: E-Prescribe    Route: Oral    sitagliptin (JANUVIA) 50 MG tablet  90 tablet 3 1/29/2019    [facility] [place] [address] [address] AT [place] [address]    Sig: Take 1 tablet (50 mg) by mouth daily    Class: E-Prescribe    Route: Oral        Family Hx   No family hx of diabetes.    Personal Hx   Smoke: None at this time.  ETOH : None at this time.    PMH   1. Type 2 Diabetes Mellitus dx in 2007.   2. Hepatitis C; pt underwent tx for Hep C and his Hep C has cleared.  3. Obesity.  4. Past hx of nicotine use.   5. Hx of undescended testis s/p surgery in 1971.   6. Anemia while undergoing hep C treatment.    Physical Exam   General appearance:   Vitals:    04/09/19 1035   BP: 146/80   Pulse: 75   Weight: 112.4 kg (247 lb 14.4 oz)   Height: 1.803 m (5' 11\")     GENERAL: Pt in no apparent distress.  SKIN: No rash.  HEENT: PERRLA; fundi not examined.  NECK: No goiter.  LUNGS: Clear b/l.  CARDIAC: RRR.  ABDOMEN: Large and " nontender.  EXTREMITIES: No pretibial edema b/l.  FEET: Warm, no ulcers and normal monofilamentous exam left foot. Some decrease sensory right foot.    Results   Creatinine   Date Value Ref Range Status   04/09/2019 0.76 0.66 - 1.25 mg/dL Final     GFR Estimate   Date Value Ref Range Status   04/09/2019 >90 >60 mL/min/[1.73_m2] Final     Comment:     Non  GFR Calc  Starting 12/18/2018, serum creatinine based estimated GFR (eGFR) will be   calculated using the Chronic Kidney Disease Epidemiology Collaboration   (CKD-EPI) equation.       Hemoglobin A1C   Date Value Ref Range Status   07/17/2013 7.0 (A) 4.3 - 6.0 % Final     Potassium   Date Value Ref Range Status   04/09/2019 4.0 3.4 - 5.3 mmol/L Final     ALT   Date Value Ref Range Status   04/09/2019 19 0 - 70 U/L Final     AST   Date Value Ref Range Status   04/09/2019 9 0 - 45 U/L Final     TSH   Date Value Ref Range Status   04/09/2019 2.72 0.40 - 4.00 mU/L Final     T4 Free   Date Value Ref Range Status   09/07/2017 1.02 0.76 - 1.46 ng/dL Final         Cholesterol   Date Value Ref Range Status   04/09/2019 152 <200 mg/dL Final   09/07/2017 132 <200 mg/dL Final     HDL Cholesterol   Date Value Ref Range Status   04/09/2019 55 >39 mg/dL Final   09/07/2017 43 >39 mg/dL Final     LDL Cholesterol Calculated   Date Value Ref Range Status   04/09/2019 77 <100 mg/dL Final     Comment:     Desirable:       <100 mg/dl   09/07/2017 64 <100 mg/dL Final     Comment:     Desirable:       <100 mg/dl     Triglycerides   Date Value Ref Range Status   04/09/2019 96 <150 mg/dL Final   09/07/2017 124 <150 mg/dL Final     Cholesterol/HDL Ratio   Date Value Ref Range Status   08/20/2014 2.8 0.0 - 5.0 Final   11/19/2013 3.7 0.0 - 5.0 Final     A1C      6.8    4/9/2019  A1C      6.7   10/9/2018  A1C      6.7   4/5/2018  A1C      6.9   12/7/2017  A1C      8.5   9/7/2017  A1C      7.7   3/7/2017  A1C      7.5   4/6/2016  A1C      7.4   7/28/2015  A1C      7.0    8/20/2014  A1C      6.8   3/18/2014  A1C      6.9  11/20/2013  A1C      7.0   7/17/2013  A1C      7.2   3/19/2013    ASSESSMENT/PLAN:     1. TYPE 2 DIABETES MELLITUS: Patient's A1C is 6.8 %  today.   He has lost approximately 40 lbs and is eating healthier and plans to increase his activity now that the weather is nicer.  I encouraged Mark to continue to make healthy food choices and walk daily.  He is to remain on Januvia 50 mg daily and Metformin 1000 mg BID.   Mark does not check his blood sugar at home- he does not like the finger sticks and he states the strips cost too much money which he can not afford at this time.  He is not interested in using the Freestyle Ama device/sensors.  Again, I reminded pt to schedule an Oph exam.  Feet are ok.  His urine microalbuminuria has been negative in the past and is + today. Will recheck urine microalbuminuria next visit and if + will start low dose ace or ARB.  Pt's creat is 0.76 with GFR > 90 mL/min today.  His TSH is normal today.    2. HYPERLIPIDEMIA: Pt's LDL 77 today.  He remains on Simvastatin.    3. OVERWEIGHT: Mark has done a nice job of losing weight.  See under # 1 above.     4.  HX OF HEPATITIS C: Pt has completed his hep C treatment and states his hep C has clear.      5. Return to Endocrine Clinic to see me in 6 months.    Again, thank you for allowing me to participate in the care of your patient.      Sincerely,    Elizabeth Guzman PA-C

## 2019-07-03 DIAGNOSIS — E11.65 TYPE 2 DIABETES MELLITUS WITH HYPERGLYCEMIA, WITHOUT LONG-TERM CURRENT USE OF INSULIN (H): ICD-10-CM

## 2019-07-07 NOTE — TELEPHONE ENCOUNTER
metFORMIN (GLUCOPHAGE) 500 MG tablet      Last Written Prescription Date:  3/20/19  Last Fill Quantity: 360 tablet,   # refills: 0  Last Office Visit : 4/9/19  Future Office visit:  none    Routing refill request to provider for review/approval because:  Failed protocol: BP

## 2019-08-23 DIAGNOSIS — E11.8 TYPE 2 DIABETES MELLITUS WITH COMPLICATION, WITH LONG-TERM CURRENT USE OF INSULIN (H): Primary | ICD-10-CM

## 2019-08-23 DIAGNOSIS — E11.9 DIABETES MELLITUS (H): ICD-10-CM

## 2019-08-23 DIAGNOSIS — Z79.4 TYPE 2 DIABETES MELLITUS WITH COMPLICATION, WITH LONG-TERM CURRENT USE OF INSULIN (H): Primary | ICD-10-CM

## 2019-08-27 RX ORDER — SIMVASTATIN 40 MG
40 TABLET ORAL DAILY
Qty: 90 TABLET | Refills: 1 | Status: SHIPPED | OUTPATIENT
Start: 2019-08-27 | End: 2020-03-05

## 2019-08-27 NOTE — TELEPHONE ENCOUNTER
simvastatin (ZOCOR) 40 MG tablet    Last Written Prescription Date:  8/9/18  Last Fill Quantity: 90,   # refills: 2  Last Office Visit : 4/9/19  Future Office visit:  none    Routing refill request to provider for review/approval because:  Drug not on the [facility], [facility] or [place] refill protocol or controlled substance

## 2019-09-18 DIAGNOSIS — E11.65 TYPE 2 DIABETES MELLITUS WITH HYPERGLYCEMIA, WITHOUT LONG-TERM CURRENT USE OF INSULIN (H): ICD-10-CM

## 2019-09-20 RX ORDER — SITAGLIPTIN 50 MG/1
TABLET, FILM COATED ORAL
Qty: 90 TABLET | Refills: 0 | OUTPATIENT
Start: 2019-09-20

## 2019-09-20 NOTE — TELEPHONE ENCOUNTER
JANUVIA 50MG TABLETS  Take 1 tablet (50 mg) by mouth daily   Last Written Prescription Date:  1/29/2019  Last Fill Quantity: 90,   # refills: 3 to [facility] [id number]  Last Office Visit : 4/9/2019  Future Office visit:  6 months    Routing refill request to provider for review/approval because:  Blood pressure  Reviewed at clinic visit. Adequate refills x 1 year sent 1/29/2019 04/09/19 146/80   10/09/18 126/76   04/05/18 153/83

## 2019-11-08 ENCOUNTER — HEALTH MAINTENANCE LETTER (OUTPATIENT)
Age: 69
End: 2019-11-08

## 2019-12-07 DIAGNOSIS — E11.65 TYPE 2 DIABETES MELLITUS WITH HYPERGLYCEMIA, WITHOUT LONG-TERM CURRENT USE OF INSULIN (H): ICD-10-CM

## 2019-12-10 ENCOUNTER — OFFICE VISIT (OUTPATIENT)
Dept: ENDOCRINOLOGY | Facility: CLINIC | Age: 69
End: 2019-12-10
Payer: MEDICARE

## 2019-12-10 VITALS
HEART RATE: 80 BPM | BODY MASS INDEX: 35.59 KG/M2 | DIASTOLIC BLOOD PRESSURE: 82 MMHG | SYSTOLIC BLOOD PRESSURE: 144 MMHG | HEIGHT: 71 IN | WEIGHT: 254.2 LBS

## 2019-12-10 DIAGNOSIS — E11.65 TYPE 2 DIABETES MELLITUS WITH HYPERGLYCEMIA, WITHOUT LONG-TERM CURRENT USE OF INSULIN (H): Primary | ICD-10-CM

## 2019-12-10 LAB — HBA1C MFR BLD: 7.2 % (ref 4.3–6)

## 2019-12-10 ASSESSMENT — MIFFLIN-ST. JEOR: SCORE: 1945.17

## 2019-12-10 ASSESSMENT — PAIN SCALES - GENERAL: PAINLEVEL: NO PAIN (0)

## 2019-12-10 NOTE — PROGRESS NOTES
HPI   Mark Garces is a 69 year old male with type 2 diabetes mellitus here today for follow up visit.   Pt's hx is significant for type 2 DM dx in 2007, obesity and past hx of Hepatitis C which has been treated successfully.  For his diabetes, he is currently taking Metformin 500 mg 2 tabs po BID and Januvia 50 mg daily.  Pt's A1C is 7.2 % today.  His previous A1C was 6.8 %.  He did not bring his glucose meter to his visit today.  Pt states he is not checking his blood sugar at home because he does not like to stick himself with the lancets.  He also states he can not afford the strips. Pt is not interested in the Freestyle Ama device/sensor.  On ROS today, he reports feeling well.  He has gained some weight.  He reports less back, hip and right leg pain.    Pt denies frequent headaches, blurred vision,  n/v, SOB at rest, cough or chest pain.  No abd pain, diarrhea, dysuria, hematuria or foot ulcers.  Some numbness and pain intermittently in this right leg. He has hx chronic back issues.  No stool/urine incontinence.  He denies any numbness, tingling or pain in his left foot/leg.    DIABETES CARE:  Retinopathy: none; he is due for an Oph exam which he states he will schedule.  He would like to see an Oph closer to his home.  Nephropathy: none; urine microalbuminuria has been negative in the past and is + April 2019. Will recheck next visit and if +, start an ace or ARB.  Neuropathy: no peripheral neuropathy; he does have some decrease sensor right foot.   Taking ASA: yes.  Lipids: LDL 77 in 4/2019 on Simvastatin.    ROS   Please see under HPI.     ALLERGIES:   No Known Allergies      Prescription Medications as of 12/10/2019       Rx Number Disp Refills Start End Last Dispensed Date Next Fill Date Owning Pharmacy    aspirin 81 MG tablet            Sig: Take 1 tablet by mouth daily.    Class: Historical    Route: Oral    glucose blood VI test strips (ONE TOUCH ULTRA TEST) strip            Sig: Reported on 3/7/2017  "   Class: Historical    metFORMIN (GLUCOPHAGE) 500 MG tablet  360 tablet 0 12/9/2019    [facility] [address] [place] [address] [address] AT [place] [address]    Sig: TAKE 2 TABLETS(1000 MG) BY MOUTH TWICE DAILY WITH MEALS    Class: E-Prescribe    Omega-3 Fatty Acids (FISH OIL) 500 MG CAPS            Sig: Take 2 capsules by mouth daily.    Class: Historical    Route: Oral    simvastatin (ZOCOR) 40 MG tablet  90 tablet 1 8/27/2019    [facility] [address] [place] [address] [address] AT [place] [address]    Sig: Take 1 tablet (40 mg) by mouth daily    Class: E-Prescribe    Route: Oral    Renewals     Renewal provider:  Elizabeth Guzman PA-C          sitagliptin (JANUVIA) 50 MG tablet  90 tablet 3 1/29/2019    [facility] [address] [place] [address] [facility] [place] [address]    Sig: Take 1 tablet (50 mg) by mouth daily    Class: E-Prescribe    Route: Oral        Family Hx   No family hx of diabetes.    Personal Hx   Smoke: None at this time.  ETOH : None at this time.    PMH   1. Type 2 Diabetes Mellitus dx in 2007.   2. Hepatitis C; pt underwent tx for Hep C and his Hep C has cleared.  3. Obesity.  4. Past hx of nicotine use.   5. Hx of undescended testis s/p surgery in 1971.   6. Anemia while undergoing hep C treatment.    Physical Exam   General appearance:   Vitals:    12/10/19 1123   BP: (!) 144/82   Pulse: 80   Weight: 115.3 kg (254 lb 3.2 oz)   Height: 1.803 m (5' 11\")     GENERAL: Pt in no apparent distress.  SKIN: No rash.  HEENT: PERRLA; fundi not examined.  NECK: No goiter.  LUNGS: Clear b/l.  CARDIAC: RRR.  ABDOMEN: Large and nontender.  EXTREMITIES: No pretibial edema b/l.  FEET: Warm, no ulcers and normal monofilamentous exam left foot. Some decrease sensory right foot.    Results   Creatinine   Date Value Ref Range Status   04/09/2019 0.76 0.66 - 1.25 mg/dL Final     GFR Estimate   Date Value Ref Range Status   04/09/2019 " >90 >60 mL/min/[1.73_m2] Final     Comment:     Non  GFR Calc  Starting 12/18/2018, serum creatinine based estimated GFR (eGFR) will be   calculated using the Chronic Kidney Disease Epidemiology Collaboration   (CKD-EPI) equation.       Hemoglobin A1C   Date Value Ref Range Status   07/17/2013 7.0 (A) 4.3 - 6.0 % Final     Potassium   Date Value Ref Range Status   04/09/2019 4.0 3.4 - 5.3 mmol/L Final     ALT   Date Value Ref Range Status   04/09/2019 19 0 - 70 U/L Final     AST   Date Value Ref Range Status   04/09/2019 9 0 - 45 U/L Final     TSH   Date Value Ref Range Status   04/09/2019 2.72 0.40 - 4.00 mU/L Final     T4 Free   Date Value Ref Range Status   09/07/2017 1.02 0.76 - 1.46 ng/dL Final         Cholesterol   Date Value Ref Range Status   04/09/2019 152 <200 mg/dL Final   09/07/2017 132 <200 mg/dL Final     HDL Cholesterol   Date Value Ref Range Status   04/09/2019 55 >39 mg/dL Final   09/07/2017 43 >39 mg/dL Final     LDL Cholesterol Calculated   Date Value Ref Range Status   04/09/2019 77 <100 mg/dL Final     Comment:     Desirable:       <100 mg/dl   09/07/2017 64 <100 mg/dL Final     Comment:     Desirable:       <100 mg/dl     Triglycerides   Date Value Ref Range Status   04/09/2019 96 <150 mg/dL Final   09/07/2017 124 <150 mg/dL Final     Cholesterol/HDL Ratio   Date Value Ref Range Status   08/20/2014 2.8 0.0 - 5.0 Final   11/19/2013 3.7 0.0 - 5.0 Final     A1C      7.2   12/10/2019  A1C      6.8    4/9/2019  A1C      6.7   10/9/2018  A1C      6.7   4/5/2018  A1C      6.9   12/7/2017  A1C      8.5   9/7/2017  A1C      7.7   3/7/2017  A1C      7.5   4/6/2016  A1C      7.4   7/28/2015  A1C      7.0   8/20/2014  A1C      6.8   3/18/2014  A1C      6.9  11/20/2013  A1C      7.0   7/17/2013  A1C      7.2   3/19/2013    ASSESSMENT/PLAN:     1. TYPE 2 DIABETES MELLITUS: Patient's A1C is 7.2 %  today.   He has gained some weight.  Pt will work on making healthy food choices and increase his  activity.  He is to remain on Januvia 50 mg daily and Metformin 1000 mg BID.   Mark does not check his blood sugar at home- he does not like the finger sticks and he states the strips cost too much money which he can not afford at this time.  He is not interested in using the Freestyle Ama device/sensors.  Again, I reminded pt to schedule an Oph exam.  Feet are ok.  His urine microalbuminuria has been negative in the past and was + in April 2019.  Will recheck urine microalbuminuria and if + will start low dose ace or ARB.  Pt's creat is 0.76 with GFR > 90 mL/min in April 2019.  His TSH is normal in 4/2019.  Flu vaccine given today.    2. HYPERLIPIDEMIA: Pt's LDL 77 in April 2019.  He remains on Simvastatin.    3. OVERWEIGHT: See under # 1 above.    4.  HX OF HEPATITIS C: Pt has completed his hep C treatment and states his hep C has clear.      5. Return to Endocrine Clinic to see me in 6 months.

## 2019-12-10 NOTE — LETTER
12/10/2019       RE: Mark Garces  [address] [place] [id number]     Dear Colleague,    Thank you for referring your patient, Mark Garces, to the [facility] at [facility]. Please see a copy of my visit note below.    HPI   Mark Garces is a 69 year old male with type 2 diabetes mellitus here today for follow up visit.   Pt's hx is significant for type 2 DM dx in 2007, obesity and past hx of Hepatitis C which has been treated successfully.  For his diabetes, he is currently taking Metformin 500 mg 2 tabs po BID and Januvia 50 mg daily.  Pt's A1C is 7.2 % today.  His previous A1C was 6.8 %.  He did not bring his glucose meter to his visit today.  Pt states he is not checking his blood sugar at home because he does not like to stick himself with the lancets.  He also states he can not afford the strips. Pt is not interested in the Freestyle Ama device/sensor.  On ROS today, he reports feeling well.  He has gained some weight.  He reports less back, hip and right leg pain.    Pt denies frequent headaches, blurred vision,  n/v, SOB at rest, cough or chest pain.  No abd pain, diarrhea, dysuria, hematuria or foot ulcers.  Some numbness and pain intermittently in this right leg. He has hx chronic back issues.  No stool/urine incontinence.  He denies any numbness, tingling or pain in his left foot/leg.    DIABETES CARE:  Retinopathy: none; he is due for an Oph exam which he states he will schedule.  He would like to see an Oph closer to his home.  Nephropathy: none; urine microalbuminuria has been negative in the past and is + April 2019. Will recheck next visit and if +, start an ace or ARB.  Neuropathy: no peripheral neuropathy; he does have some decrease sensor right foot.   Taking ASA: yes.  Lipids: LDL 77 in 4/2019 on Simvastatin.    ROS   Please see under HPI.     ALLERGIES:   No Known Allergies      Prescription Medications as of 12/10/2019       Rx Number  "Disp Refills Start End Last Dispensed Date Next Fill Date Owning Pharmacy    aspirin 81 MG tablet            Sig: Take 1 tablet by mouth daily.    Class: Historical    Route: Oral    glucose blood VI test strips (ONE TOUCH ULTRA TEST) strip            Sig: Reported on 3/7/2017    Class: Historical    metFORMIN (GLUCOPHAGE) 500 MG tablet  360 tablet 0 12/9/2019    [id number], [place] [address] AT [place] [address]    Sig: TAKE 2 TABLETS(1000 MG) BY MOUTH TWICE DAILY WITH MEALS    Class: E-Prescribe    Omega-3 Fatty Acids (FISH OIL) 500 MG CAPS            Sig: Take 2 capsules by mouth daily.    Class: Historical    Route: Oral    simvastatin (ZOCOR) 40 MG tablet  90 tablet 1 8/27/2019    [facility] #[id number] [facility], [place] [address] [address] AT [place] [address]    Sig: Take 1 tablet (40 mg) by mouth daily    Class: E-Prescribe    Route: Oral    Renewals     Renewal provider:  Elizabeth Guzman PA-C          sitagliptin (JANUVIA) 50 MG tablet  90 tablet 3 1/29/2019    [id number] AT [place] [address]    Sig: Take 1 tablet (50 mg) by mouth daily    Class: E-Prescribe    Route: Oral        Family Hx   No family hx of diabetes.    Personal Hx   Smoke: None at this time.  ETOH : None at this time.    PMH   1. Type 2 Diabetes Mellitus dx in 2007.   2. Hepatitis C; pt underwent tx for Hep C and his Hep C has cleared.  3. Obesity.  4. Past hx of nicotine use.   5. Hx of undescended testis s/p surgery in 1971.   6. Anemia while undergoing hep C treatment.    Physical Exam   General appearance:   Vitals:    12/10/19 1123   BP: (!) 144/82   Pulse: 80   Weight: 115.3 kg (254 lb 3.2 oz)   Height: 1.803 m (5' 11\")     GENERAL: Pt in no apparent distress.  SKIN: No rash.  HEENT: PERRLA; fundi not examined.  NECK: No goiter.  LUNGS: Clear b/l.  CARDIAC: RRR.  ABDOMEN: Large and nontender.  EXTREMITIES: No " pretibial edema b/l.  FEET: Warm, no ulcers and normal monofilamentous exam left foot. Some decrease sensory right foot.    Results   Creatinine   Date Value Ref Range Status   04/09/2019 0.76 0.66 - 1.25 mg/dL Final     GFR Estimate   Date Value Ref Range Status   04/09/2019 >90 >60 mL/min/[1.73_m2] Final     Comment:     Non  GFR Calc  Starting 12/18/2018, serum creatinine based estimated GFR (eGFR) will be   calculated using the Chronic Kidney Disease Epidemiology Collaboration   (CKD-EPI) equation.       Hemoglobin A1C   Date Value Ref Range Status   07/17/2013 7.0 (A) 4.3 - 6.0 % Final     Potassium   Date Value Ref Range Status   04/09/2019 4.0 3.4 - 5.3 mmol/L Final     ALT   Date Value Ref Range Status   04/09/2019 19 0 - 70 U/L Final     AST   Date Value Ref Range Status   04/09/2019 9 0 - 45 U/L Final     TSH   Date Value Ref Range Status   04/09/2019 2.72 0.40 - 4.00 mU/L Final     T4 Free   Date Value Ref Range Status   09/07/2017 1.02 0.76 - 1.46 ng/dL Final         Cholesterol   Date Value Ref Range Status   04/09/2019 152 <200 mg/dL Final   09/07/2017 132 <200 mg/dL Final     HDL Cholesterol   Date Value Ref Range Status   04/09/2019 55 >39 mg/dL Final   09/07/2017 43 >39 mg/dL Final     LDL Cholesterol Calculated   Date Value Ref Range Status   04/09/2019 77 <100 mg/dL Final     Comment:     Desirable:       <100 mg/dl   09/07/2017 64 <100 mg/dL Final     Comment:     Desirable:       <100 mg/dl     Triglycerides   Date Value Ref Range Status   04/09/2019 96 <150 mg/dL Final   09/07/2017 124 <150 mg/dL Final     Cholesterol/HDL Ratio   Date Value Ref Range Status   08/20/2014 2.8 0.0 - 5.0 Final   11/19/2013 3.7 0.0 - 5.0 Final     A1C      7.2   12/10/2019  A1C      6.8    4/9/2019  A1C      6.7   10/9/2018  A1C      6.7   4/5/2018  A1C      6.9   12/7/2017  A1C      8.5   9/7/2017  A1C      7.7   3/7/2017  A1C      7.5   4/6/2016  A1C      7.4   7/28/2015  A1C      7.0    8/20/2014  A1C      6.8   3/18/2014  A1C      6.9  11/20/2013  A1C      7.0   7/17/2013  A1C      7.2   3/19/2013    ASSESSMENT/PLAN:     1. TYPE 2 DIABETES MELLITUS: Patient's A1C is 7.2 %  today.   He has gained some weight.  Pt will work on making healthy food choices and increase his activity.  He is to remain on Januvia 50 mg daily and Metformin 1000 mg BID.   Mark does not check his blood sugar at home- he does not like the finger sticks and he states the strips cost too much money which he can not afford at this time.  He is not interested in using the Freestyle Ama device/sensors.  Again, I reminded pt to schedule an Oph exam.  Feet are ok.  His urine microalbuminuria has been negative in the past and was + in April 2019.  Will recheck urine microalbuminuria and if + will start low dose ace or ARB.  Pt's creat is 0.76 with GFR > 90 mL/min in April 2019.  His TSH is normal in 4/2019.  Flu vaccine given today.    2. HYPERLIPIDEMIA: Pt's LDL 77 in April 2019.  He remains on Simvastatin.    3. OVERWEIGHT: See under # 1 above.    4.  HX OF HEPATITIS C: Pt has completed his hep C treatment and states his hep C has clear.      5. Return to Endocrine Clinic to see me in 6 months.    Again, thank you for allowing me to participate in the care of your patient.      Sincerely,    Elizabeth Guzman PA-C

## 2019-12-10 NOTE — PATIENT INSTRUCTIONS
Continue current doses of Metformin and Januvia.  Flu vaccine given today.  See the eye doctor.  See me in 6 months.

## 2020-02-23 ENCOUNTER — HEALTH MAINTENANCE LETTER (OUTPATIENT)
Age: 70
End: 2020-02-23

## 2020-03-05 DIAGNOSIS — Z79.4 TYPE 2 DIABETES MELLITUS WITH COMPLICATION, WITH LONG-TERM CURRENT USE OF INSULIN (H): ICD-10-CM

## 2020-03-05 DIAGNOSIS — E11.8 TYPE 2 DIABETES MELLITUS WITH COMPLICATION, WITH LONG-TERM CURRENT USE OF INSULIN (H): ICD-10-CM

## 2020-03-05 RX ORDER — SIMVASTATIN 40 MG
40 TABLET ORAL DAILY
Qty: 90 TABLET | Refills: 1 | Status: SHIPPED | OUTPATIENT
Start: 2020-03-05 | End: 2020-08-31

## 2020-03-05 NOTE — TELEPHONE ENCOUNTER
[facility] Call Center    Phone Message    May a detailed message be left on voicemail: yes     Reason for Call: Medication Refill Request    Has the patient contacted the pharmacy for the refill? Yes   Name of medication being requested: simvastatin (ZOCOR) 40 MG tabletsimvastatin (ZOCOR) 40 MG tablet  Provider who prescribed the medication: Megan  Pharmacy: [facility] pharmacy [address] [place] [address], [phone]   Date medication is needed: ASAP    **Pt stated that at his last appointment he changed his preferred pharmacy, and that he is still having issues with getting Rx's filled. He stated that the pharmacy has been reaching out to the clinic electronically, but gotten no response. Pt has two days left of this medication. Please send refill, and call Pt to up date once sent.**      Action Taken: Message routed to:  [facility] Endo    Travel Screening: Not Applicable

## 2020-03-05 NOTE — TELEPHONE ENCOUNTER
Centralized Medication Refill Team note:   simvastatin (ZOCOR) 40 MG  Last Written Prescription Date:  8/27/2019  Last Fill Quantity: 90,   # refills: 1  Last Office Visit : 12/10/2019  Future Office visit:  6/10/20   LDL and Cr WNL 4/2019   [facility] pharmacy [address] [place] [address], [phone]   Called patient and confirmed prescription has been sent.

## 2020-04-07 ENCOUNTER — TELEPHONE (OUTPATIENT)
Dept: ENDOCRINOLOGY | Facility: CLINIC | Age: 70
End: 2020-04-07

## 2020-04-07 DIAGNOSIS — E11.65 TYPE 2 DIABETES MELLITUS WITH HYPERGLYCEMIA, WITHOUT LONG-TERM CURRENT USE OF INSULIN (H): ICD-10-CM

## 2020-04-07 NOTE — TELEPHONE ENCOUNTER
90 day with 1 refill given until seen in 6/2020 for f/u. Metformin and Dahlia Castaneda RN on 4/7/2020 at 1:59 PM

## 2020-04-07 NOTE — TELEPHONE ENCOUNTER
[facility] Call Center    Phone Message    May a detailed message be left on voicemail: yes     Reason for Call: Medication Refill Request    Has the patient contacted the pharmacy for the refill? Yes   Name of medication being requested: metFORMIN (GLUCOPHAGE) 500 MG tablet & sitagliptin (JANUVIA) 50 MG tablet   Provider who prescribed the medication: Elizabeth Guzman  Pharmacy:    [id number] [place], [place] - [address] [facility]    Date medication is needed: ASAP - Pt told the pharmacy that he's out and is suppose to be getting 1 year of supplies.          Action Taken: Message routed to:  [facility] Diabetes    Travel Screening: Not Applicable

## 2020-04-15 DIAGNOSIS — E11.65 TYPE 2 DIABETES MELLITUS WITH HYPERGLYCEMIA, WITHOUT LONG-TERM CURRENT USE OF INSULIN (H): ICD-10-CM

## 2020-06-09 NOTE — PROGRESS NOTES
"Mark Garces is a 69 year old male who is being evaluated via a billable telephone visit.      The patient has been notified of following:     \"This telephone visit will be conducted via a call between you and your physician/provider. We have found that certain health care needs can be provided without the need for an in-person physical exam.  This service lets us provide the care you need with a telephone conversation.  If a prescription is necessary we can send it directly to your pharmacy.  If lab work is needed we can place an order for that and you can then stop by our lab to have the test done at a later time.    Telephone visits are billed at different rates depending on your insurance coverage.  Please reach out to your insurance provider with any questions.    If during the course of the call the physician/provider feels a telephone visit is not appropriate, you will not be charged for this service.\"    Patient has given verbal consent for telephone visit? Yes    How would you like to obtain your AVS? Ranjan Forrest MA    Patients Glucose Data was not obtained. Patient is not checking blood sugars.     Due to the COVID 19 pandemic this visit was converted to a telephone visit in order to help prevent spread of infection in this patient and the general population.    Time of start: 10:01 am  Time of end: 10:10 am  Total duration of telephone visit: 9 minutes.    HPI   Mark Garces is a 69 year old male with type 2 diabetes mellitus. Telephone visit for diabetes follow up.  Pt's hx is significant for type 2 DM dx in 2007, obesity and past hx of Hepatitis C which has been treated successfully.  For his diabetes, he is currently taking Metformin 500 mg 2 tabs po BID and Januvia 50 mg daily.  Pt's A1C was 7.2 % in Dec 2019.   His previous A1C was 6.8 %.  Mark has not been checking his blood sugar.  Pt states he is not checking his blood sugar at home because he does not like to stick himself " with the lancets.  He also states he can not afford the strips. Pt is not interested in the Freestyle Ama device/sensor.  On ROS today, he reports feeling well.  He has been active this summer.  He reports less back, hip and right leg pain.    Mark denies polydipsia or polyuria.  Pt denies frequent headaches, blurred vision,  n/v, SOB at rest, cough, fever, chills or chest pain.  No abd pain, diarrhea, dysuria, hematuria or foot ulcers.  Some numbness and pain intermittently in this right leg. He has hx chronic back issues.  No stool/urine incontinence.  He denies any numbness, tingling or pain in his left foot/leg.    DIABETES CARE:  Retinopathy: none; he is due for an Oph exam which he states he will schedule once COVID19 pandemic has resolved.  Nephropathy: none; urine microalbuminuria has been negative in the past and is + April 2019. Will recheck next visit and if +, start an ace or ARB.  Neuropathy: no peripheral neuropathy; he does have some decrease sensor right foot.   Foot exam: no exam today.  Taking ASA: yes.  Lipids: LDL 77 in 4/2019 on Simvastatin.  Insulin: none. DM meds: Metformin and Januvia.  Testing: not testing- can not afford strips or sensor.    ROS   Please see under HPI.     ALLERGIES:   No Known Allergies      Prescription Medications as of 6/10/2020       Rx Number Disp Refills Start End Last Dispensed Date Next Fill Date Owning Pharmacy    aspirin 81 MG tablet            Sig: Take 1 tablet by mouth daily.    Class: Historical    Route: Oral    glucose blood VI test strips (ONE TOUCH ULTRA TEST) strip            Sig: Reported on 3/7/2017    Class: Historical    metFORMIN (GLUCOPHAGE) 500 MG tablet  360 tablet 1 4/7/2020    [facility]/pharmacy #[id number] [place], [place] - [address] [facility]    Sig: TAKE 2 TABLETS(1000 MG) BY MOUTH TWICE DAILY WITH MEALS    Class: E-Prescribe    Cosign for Ordering: Accepted by Elizabeth Guzman PA-C on 4/7/2020  2:40 PM    Omega-3 Fatty Acids (FISH OIL) 500 MG  CAPS            Sig: Take 2 capsules by mouth daily.    Class: Historical    Route: Oral    simvastatin (ZOCOR) 40 MG tablet  90 tablet 1 3/5/2020    [facility]/pharmacy #[id number] [place], [place] - [address] [facility]    Sig: Take 1 tablet (40 mg) by mouth daily    Class: E-Prescribe    Route: Oral    sitagliptin (JANUVIA) 50 MG tablet  90 tablet 3 4/15/2020    [facility]/pharmacy #[id number] [facility] [address] [facility]    Sig: Take 1 tablet (50 mg) by mouth daily    Class: E-Prescribe    Route: Oral        Family Hx   No family hx of diabetes.    Personal Hx   Smoke: None at this time.  ETOH : None at this time.    PMH   1. Type 2 Diabetes Mellitus dx in 2007.   2. Hepatitis C; pt underwent tx for Hep C and his Hep C has cleared.  3. Obesity.  4. Past hx of nicotine use.   5. Hx of undescended testis s/p surgery in 1971.   6. Anemia while undergoing hep C treatment.    Physical Exam   No exam today.    Results   Creatinine   Date Value Ref Range Status   04/09/2019 0.76 0.66 - 1.25 mg/dL Final     GFR Estimate   Date Value Ref Range Status   04/09/2019 >90 >60 mL/min/[1.73_m2] Final     Comment:     Non  GFR Calc  Starting 12/18/2018, serum creatinine based estimated GFR (eGFR) will be   calculated using the Chronic Kidney Disease Epidemiology Collaboration   (CKD-EPI) equation.       Hemoglobin A1C   Date Value Ref Range Status   07/17/2013 7.0 (A) 4.3 - 6.0 % Final     Potassium   Date Value Ref Range Status   04/09/2019 4.0 3.4 - 5.3 mmol/L Final     ALT   Date Value Ref Range Status   04/09/2019 19 0 - 70 U/L Final     AST   Date Value Ref Range Status   04/09/2019 9 0 - 45 U/L Final     TSH   Date Value Ref Range Status   04/09/2019 2.72 0.40 - 4.00 mU/L Final     T4 Free   Date Value Ref Range Status   09/07/2017 1.02 0.76 - 1.46 ng/dL Final         Cholesterol   Date Value Ref Range Status   04/09/2019 152 <200 mg/dL Final   09/07/2017 132 <200 mg/dL Final     HDL Cholesterol   Date Value Ref Range Status    04/09/2019 55 >39 mg/dL Final   09/07/2017 43 >39 mg/dL Final     LDL Cholesterol Calculated   Date Value Ref Range Status   04/09/2019 77 <100 mg/dL Final     Comment:     Desirable:       <100 mg/dl   09/07/2017 64 <100 mg/dL Final     Comment:     Desirable:       <100 mg/dl     Triglycerides   Date Value Ref Range Status   04/09/2019 96 <150 mg/dL Final   09/07/2017 124 <150 mg/dL Final     Cholesterol/HDL Ratio   Date Value Ref Range Status   08/20/2014 2.8 0.0 - 5.0 Final   11/19/2013 3.7 0.0 - 5.0 Final     A1C      7.2   12/10/2019  A1C      6.8    4/9/2019  A1C      6.7   10/9/2018  A1C      6.7   4/5/2018  A1C      6.9   12/7/2017  A1C      8.5   9/7/2017  A1C      7.7   3/7/2017  A1C      7.5   4/6/2016  A1C      7.4   7/28/2015  A1C      7.0   8/20/2014  A1C      6.8   3/18/2014  A1C      6.9  11/20/2013  A1C      7.0   7/17/2013  A1C      7.2   3/19/2013    ASSESSMENT/PLAN:     1. TYPE 2 DIABETES MELLITUS: Patient's A1C was 7.2 % in Dec 2019.  Mark has been active this summer and he states he is eating healthy and feels good.  He is to remain on Januvia 50 mg daily and Metformin 1000 mg BID.   Mark does not check his blood sugar at home- he does not like the finger sticks and he states the strips cost too much money. He can not afford the Freestyle Ama device/sensors at this time.  He plans to see an [facility] for a diabetic eye exam once COVID19 pandemic has resolved.  His urine microalbuminuria has been negative in the past and was + in April 2019.  Will recheck urine microalbuminuria and if + will start low dose ace or ARB.  Logan's creat is 0.76 with GFR > 90 mL/min in April 2019.  Mark denies any foot ulcers at this time.  His TSH is normal in 4/2019.    2. HYPERLIPIDEMIA: Pt's LDL 77 in April 2019.  He remains on Simvastatin.    3. OVERWEIGHT: See under # 1 above.    4.  HX OF HEPATITIS C: Pt has completed his hep C treatment and states his hep C has clear.      5. Return to Endocrine Clinic to  see me in 6 months.  I placed an order for annual fasting diabetes labs including an A1C today.

## 2020-06-10 ENCOUNTER — VIRTUAL VISIT (OUTPATIENT)
Dept: ENDOCRINOLOGY | Facility: CLINIC | Age: 70
End: 2020-06-10
Payer: MEDICARE

## 2020-06-10 DIAGNOSIS — E11.65 TYPE 2 DIABETES MELLITUS WITH HYPERGLYCEMIA, WITHOUT LONG-TERM CURRENT USE OF INSULIN (H): Primary | ICD-10-CM

## 2020-06-10 NOTE — LETTER
"6/10/2020       RE: Mark Garces  [address] [place] [id number]     Dear Colleague,    Thank you for referring your patient, Mark Garces, to the [facility] ENDOCRINOLOGY at [facility]. Please see a copy of my visit note below.    Mark Garces is a 69 year old male who is being evaluated via a billable telephone visit.      The patient has been notified of following:     \"This telephone visit will be conducted via a call between you and your physician/provider. We have found that certain health care needs can be provided without the need for an in-person physical exam.  This service lets us provide the care you need with a telephone conversation.  If a prescription is necessary we can send it directly to your pharmacy.  If lab work is needed we can place an order for that and you can then stop by our lab to have the test done at a later time.    Telephone visits are billed at different rates depending on your insurance coverage.  Please reach out to your insurance provider with any questions.    If during the course of the call the physician/provider feels a telephone visit is not appropriate, you will not be charged for this service.\"    Patient has given verbal consent for telephone visit? Yes    How would you like to obtain your AVS? Ranjan Forrest MA    Patients Glucose Data was not obtained. Patient is not checking blood sugars.     Due to the COVID 19 pandemic this visit was converted to a telephone visit in order to help prevent spread of infection in this patient and the general population.    Time of start: 10:01 am  Time of end: 10:10 am  Total duration of telephone visit: 9 minutes.    HPI   Mark Garces is a 69 year old male with type 2 diabetes mellitus. Telephone visit for diabetes follow up.  Pt's hx is significant for type 2 DM dx in 2007, obesity and past hx of Hepatitis C which has been treated successfully.  For his diabetes, he is " currently taking Metformin 500 mg 2 tabs po BID and Januvia 50 mg daily.  Pt's A1C was 7.2 % in Dec 2019.   His previous A1C was 6.8 %.  Mark has not been checking his blood sugar.  Pt states he is not checking his blood sugar at home because he does not like to stick himself with the lancets.  He also states he can not afford the strips. Pt is not interested in the Freestyle Ama device/sensor.  On ROS today, he reports feeling well.  He has been active this summer.  He reports less back, hip and right leg pain.    Mark denies polydipsia or polyuria.  Pt denies frequent headaches, blurred vision,  n/v, SOB at rest, cough, fever, chills or chest pain.  No abd pain, diarrhea, dysuria, hematuria or foot ulcers.  Some numbness and pain intermittently in this right leg. He has hx chronic back issues.  No stool/urine incontinence.  He denies any numbness, tingling or pain in his left foot/leg.    DIABETES CARE:  Retinopathy: none; he is due for an Oph exam which he states he will schedule once COVID19 pandemic has resolved.  Nephropathy: none; urine microalbuminuria has been negative in the past and is + April 2019. Will recheck next visit and if +, start an ace or ARB.  Neuropathy: no peripheral neuropathy; he does have some decrease sensor right foot.   Foot exam: no exam today.  Taking ASA: yes.  Lipids: LDL 77 in 4/2019 on Simvastatin.  Insulin: none. DM meds: Metformin and Januvia.  Testing: not testing- can not afford strips or sensor.    ROS   Please see under HPI.     ALLERGIES:   No Known Allergies      Prescription Medications as of 6/10/2020       Rx Number Disp Refills Start End Last Dispensed Date Next Fill Date Owning Pharmacy    aspirin 81 MG tablet            Sig: Take 1 tablet by mouth daily.    Class: Historical    Route: Oral    glucose blood VI test strips (ONE TOUCH ULTRA TEST) strip            Sig: Reported on 3/7/2017    Class: Historical    metFORMIN (GLUCOPHAGE) 500 MG tablet  360 tablet 1  4/7/2020    [facility]/pharmacy #[address] [place], [place] [id number] [facility]    Sig: TAKE 2 TABLETS(1000 MG) BY MOUTH TWICE DAILY WITH MEALS    Class: E-Prescribe    Cosign for Ordering: Accepted by Elizabeth Guzman PA-C on 4/7/2020  2:40 PM    Omega-3 Fatty Acids (FISH OIL) 500 MG CAPS            Sig: Take 2 capsules by mouth daily.    Class: Historical    Route: Oral    simvastatin (ZOCOR) 40 MG tablet  90 tablet 1 3/5/2020    [facility]/pharmacy #[address] [place] [address]    Sig: Take 1 tablet (40 mg) by mouth daily    Class: E-Prescribe    Route: Oral    sitagliptin (JANUVIA) 50 MG tablet  90 tablet 3 4/15/2020    [facility]/pharmacy #[address] [place] [address]    Sig: Take 1 tablet (50 mg) by mouth daily    Class: E-Prescribe    Route: Oral        Family Hx   No family hx of diabetes.    Personal Hx   Smoke: None at this time.  ETOH : None at this time.    PMH   1. Type 2 Diabetes Mellitus dx in 2007.   2. Hepatitis C; pt underwent tx for Hep C and his Hep C has cleared.  3. Obesity.  4. Past hx of nicotine use.   5. Hx of undescended testis s/p surgery in 1971.   6. Anemia while undergoing hep C treatment.    Physical Exam   No exam today.    Results   Creatinine   Date Value Ref Range Status   04/09/2019 0.76 0.66 - 1.25 mg/dL Final     GFR Estimate   Date Value Ref Range Status   04/09/2019 >90 >60 mL/min/[1.73_m2] Final     Comment:     Non  GFR Calc  Starting 12/18/2018, serum creatinine based estimated GFR (eGFR) will be   calculated using the Chronic Kidney Disease Epidemiology Collaboration   (CKD-EPI) equation.       Hemoglobin A1C   Date Value Ref Range Status   07/17/2013 7.0 (A) 4.3 - 6.0 % Final     Potassium   Date Value Ref Range Status   04/09/2019 4.0 3.4 - 5.3 mmol/L Final     ALT   Date Value Ref Range Status   04/09/2019 19 0 - 70 U/L Final     AST   Date Value Ref Range Status   04/09/2019 9 0 - 45 U/L Final     TSH   Date Value Ref Range Status   04/09/2019 2.72  0.40 - 4.00 mU/L Final     T4 Free   Date Value Ref Range Status   09/07/2017 1.02 0.76 - 1.46 ng/dL Final         Cholesterol   Date Value Ref Range Status   04/09/2019 152 <200 mg/dL Final   09/07/2017 132 <200 mg/dL Final     HDL Cholesterol   Date Value Ref Range Status   04/09/2019 55 >39 mg/dL Final   09/07/2017 43 >39 mg/dL Final     LDL Cholesterol Calculated   Date Value Ref Range Status   04/09/2019 77 <100 mg/dL Final     Comment:     Desirable:       <100 mg/dl   09/07/2017 64 <100 mg/dL Final     Comment:     Desirable:       <100 mg/dl     Triglycerides   Date Value Ref Range Status   04/09/2019 96 <150 mg/dL Final   09/07/2017 124 <150 mg/dL Final     Cholesterol/HDL Ratio   Date Value Ref Range Status   08/20/2014 2.8 0.0 - 5.0 Final   11/19/2013 3.7 0.0 - 5.0 Final     A1C      7.2   12/10/2019  A1C      6.8    4/9/2019  A1C      6.7   10/9/2018  A1C      6.7   4/5/2018  A1C      6.9   12/7/2017  A1C      8.5   9/7/2017  A1C      7.7   3/7/2017  A1C      7.5   4/6/2016  A1C      7.4   7/28/2015  A1C      7.0   8/20/2014  A1C      6.8   3/18/2014  A1C      6.9  11/20/2013  A1C      7.0   7/17/2013  A1C      7.2   3/19/2013    ASSESSMENT/PLAN:     1. TYPE 2 DIABETES MELLITUS: Patient's A1C was 7.2 % in Dec 2019.  Mark has been active this summer and he states he is eating healthy and feels good.  He is to remain on Januvia 50 mg daily and Metformin 1000 mg BID.   Mark does not check his blood sugar at home- he does not like the finger sticks and he states the strips cost too much money. He can not afford the Freestyle Ama device/sensors at this time.  He plans to see an [facility] for a diabetic eye exam once COVID19 pandemic has resolved.  His urine microalbuminuria has been negative in the past and was + in April 2019.  Will recheck urine microalbuminuria and if + will start low dose ace or ARB.  Pt's creat is 0.76 with GFR > 90 mL/min in April 2019.  Mark denies any foot ulcers at this time.  His  TSH is normal in 4/2019.    2. HYPERLIPIDEMIA: Pt's LDL 77 in April 2019.  He remains on Simvastatin.    3. OVERWEIGHT: See under # 1 above.    4.  HX OF HEPATITIS C: Pt has completed his hep C treatment and states his hep C has clear.      5. Return to Endocrine Clinic to see me in 6 months.  I placed an order for annual fasting diabetes labs including an A1C today.    Again, thank you for allowing me to participate in the care of your patient.      Sincerely,    Elizabeth Guzman PA-C

## 2020-08-31 ENCOUNTER — MYC MEDICAL ADVICE (OUTPATIENT)
Dept: ENDOCRINOLOGY | Facility: CLINIC | Age: 70
End: 2020-08-31

## 2020-08-31 DIAGNOSIS — Z79.4 TYPE 2 DIABETES MELLITUS WITH COMPLICATION, WITH LONG-TERM CURRENT USE OF INSULIN (H): ICD-10-CM

## 2020-08-31 DIAGNOSIS — E11.8 TYPE 2 DIABETES MELLITUS WITH COMPLICATION, WITH LONG-TERM CURRENT USE OF INSULIN (H): ICD-10-CM

## 2020-08-31 RX ORDER — SIMVASTATIN 40 MG
40 TABLET ORAL DAILY
Qty: 90 TABLET | Refills: 0 | Status: SHIPPED | OUTPATIENT
Start: 2020-08-31 | End: 2020-10-19

## 2020-08-31 NOTE — TELEPHONE ENCOUNTER
[facility] Protocol [id number]/2020 03:29 PM   LDL on file in past 12 months     Lab orders in place. Pt notified via [employer] to schedule labs.

## 2020-09-01 ENCOUNTER — AMBULATORY - HEALTHEAST (OUTPATIENT)
Dept: MULTI SPECIALTY CLINIC | Facility: CLINIC | Age: 70
End: 2020-09-01

## 2020-09-01 DIAGNOSIS — E11.65 TYPE 2 DIABETES MELLITUS WITH HYPERGLYCEMIA, WITHOUT LONG-TERM CURRENT USE OF INSULIN (H): ICD-10-CM

## 2020-09-01 LAB
ALT SERPL W P-5'-P-CCNC: 18 U/L (ref 0–70)
AST SERPL W P-5'-P-CCNC: 7 U/L (ref 0–45)
CHOLEST SERPL-MCNC: 146 MG/DL
CREAT SERPL-MCNC: 0.9 MG/DL (ref 0.66–1.25)
CREAT UR-MCNC: 141 MG/DL
GFR SERPL CREATININE-BSD FRML MDRD: 86 ML/MIN/{1.73_M2}
HBA1C MFR BLD: 7.3 % (ref 0–5.6)
HBA1C MFR BLD: 7.3 % (ref 0–5.6)
HDLC SERPL-MCNC: 53 MG/DL
LDLC SERPL CALC-MCNC: 79 MG/DL
LDLC SERPL CALC-MCNC: 79 MG/DL
MICROALBUMIN UR-MCNC: 26 MG/L
MICROALBUMIN/CREAT UR: 18.51 MG/G CR (ref 0–17)
NONHDLC SERPL-MCNC: 93 MG/DL
TRIGL SERPL-MCNC: 71 MG/DL
TSH SERPL DL<=0.005 MIU/L-ACNC: 2.85 MU/L (ref 0.4–4)

## 2020-09-03 ENCOUNTER — TELEPHONE (OUTPATIENT)
Dept: ENDOCRINOLOGY | Facility: CLINIC | Age: 70
End: 2020-09-03

## 2020-09-03 NOTE — TELEPHONE ENCOUNTER
9/3/2020  Called pt to review lab results. Left message that his A1C is 7.3 % and creat/GFR, ALT/AST, lipid panel and TSH are all normal.  He has mild microalbuminuria. Will plan to recheck next visit and if + start low dose ace or ARB.  Elizabeth Guzman PA-C

## 2020-10-19 DIAGNOSIS — E11.65 TYPE 2 DIABETES MELLITUS WITH HYPERGLYCEMIA, WITHOUT LONG-TERM CURRENT USE OF INSULIN (H): ICD-10-CM

## 2020-10-19 DIAGNOSIS — E11.8 TYPE 2 DIABETES MELLITUS WITH COMPLICATION, WITH LONG-TERM CURRENT USE OF INSULIN (H): ICD-10-CM

## 2020-10-19 DIAGNOSIS — Z79.4 TYPE 2 DIABETES MELLITUS WITH COMPLICATION, WITH LONG-TERM CURRENT USE OF INSULIN (H): ICD-10-CM

## 2020-10-19 RX ORDER — SIMVASTATIN 40 MG
40 TABLET ORAL DAILY
Qty: 90 TABLET | Refills: 3 | Status: SHIPPED | OUTPATIENT
Start: 2020-10-19 | End: 2021-11-11

## 2020-10-22 RX ORDER — SIMVASTATIN 40 MG
TABLET ORAL
Qty: 90 TABLET | Refills: 0 | OUTPATIENT
Start: 2020-10-22

## 2020-10-22 NOTE — TELEPHONE ENCOUNTER
SIMVASTATIN 40 MG TABLET  simvastatin (ZOCOR) 40 MG tablet 90 tablet 3 10/19/2020  No   Sig - Route: Take 1 tablet (40 mg) by mouth daily Lab draw needed. Contact any [facility] lab to schedule. - Oral   Sent to pharmacy as: Simvastatin 40 MG Oral Tablet (ZOCOR)   Class: E-Prescribe   Order: [id number]   Cosign for Ordering: Accepted by Elizabeth Guzman PA-C on 10/19/2020  6:25 PM   E-Prescribing Status: Receipt confirmed by pharmacy (10/19/2020  1:36 PM CDT)   Printout Tracking    External Result Report   Medication Administration Instructions    Lab draw needed. Contact any [facility] lab to schedule.   Pharmacy    [facility]/PHARMACY #[id number] - [place], [place] - [address] [facility]

## 2020-12-06 ENCOUNTER — HEALTH MAINTENANCE LETTER (OUTPATIENT)
Age: 70
End: 2020-12-06

## 2020-12-10 ENCOUNTER — OFFICE VISIT - HEALTHEAST (OUTPATIENT)
Dept: FAMILY MEDICINE | Facility: CLINIC | Age: 70
End: 2020-12-10

## 2020-12-10 DIAGNOSIS — E66.811 CLASS 1 OBESITY DUE TO EXCESS CALORIES WITH SERIOUS COMORBIDITY IN ADULT, UNSPECIFIED BMI: ICD-10-CM

## 2020-12-10 DIAGNOSIS — U07.1 CLINICAL DIAGNOSIS OF COVID-19: ICD-10-CM

## 2020-12-10 DIAGNOSIS — E11.9 TYPE 2 DIABETES MELLITUS WITHOUT COMPLICATION, WITHOUT LONG-TERM CURRENT USE OF INSULIN (H): ICD-10-CM

## 2020-12-10 DIAGNOSIS — E66.09 CLASS 1 OBESITY DUE TO EXCESS CALORIES WITH SERIOUS COMORBIDITY IN ADULT, UNSPECIFIED BMI: ICD-10-CM

## 2021-03-08 ENCOUNTER — IMMUNIZATION (OUTPATIENT)
Dept: NURSING | Facility: CLINIC | Age: 71
End: 2021-03-08
Payer: MEDICARE

## 2021-03-08 PROCEDURE — 91303 PR COVID VAC JANSSEN AD26 0.5ML: CPT

## 2021-03-08 PROCEDURE — 0031A PR COVID VAC JANSSEN AD26 0.5ML: CPT

## 2021-04-11 ENCOUNTER — HEALTH MAINTENANCE LETTER (OUTPATIENT)
Age: 71
End: 2021-04-11

## 2021-05-20 ENCOUNTER — COMMUNICATION - HEALTHEAST (OUTPATIENT)
Dept: FAMILY MEDICINE | Facility: CLINIC | Age: 71
End: 2021-05-20

## 2021-06-13 NOTE — PROGRESS NOTES
"Mark Garces is a 70 y.o. male who is being evaluated via a billable telephone visit.      The patient has been notified of following:     \"This telephone visit will be conducted via a call between you and your physician/provider. We have found that certain health care needs can be provided without the need for a physical exam.  This service lets us provide the care you need with a short phone conversation.  If a prescription is necessary we can send it directly to your pharmacy.  If lab work is needed we can place an order for that and you can then stop by our lab to have the test done at a later time.    Telephone visits are billed at different rates depending on your insurance coverage. During this emergency period, for some insurers they may be billed the same as an in-person visit.  Please reach out to your insurance provider with any questions.    If during the course of the call the physician/provider feels a telephone visit is not appropriate, you will not be charged for this service.\"    Patient has given verbal consent to a Telephone visit? Yes    What phone number would you like to be contacted at? [phone]    Patient would like to receive their AVS by AVS Preference: Mail a copy.    Single  No children  Tobacco: quit   EtOH: quit   Mom -  94 (2020) \"old age\"; breast CA x 2  Dad -  in 2012 at age 86 dementia  3 sis - older sis (74) with recent diagnosis of Alzheimer's; youngest with mental illness  Surgeries: undescended testicle at birth (surgery in college)  Treated for hep C (high risk behavior in the 1980s) treated in  (Dr. Townsend at [facility]) with subsequent anemia due to treatment  Hospitalizations: none  Work: retired  Hobbies:     Additional provider notes:   Virtual visit completed.  Recent COVID-19 diagnosis this morning after testing 2020.  Onset of symptoms described  however did have some chest congestion and mild cough as " well as sore throat over past week or 2.  Noted loss of taste and smell beginning last Friday.  Some fatigue perhaps mild shortness of breath with activity.  Again no significant cough.  No fevers.  Temps around 97.9 versus 97.5 typically.  Patient does have history of obesity describes weight as 250 pounds around 5 foot 11 inches.  Type 2 diabetes noted with Metformin 500 mg using 2 tablets twice daily and Januvia 100 mg daily.  Had been treated in the past for chronic hepatitis C and did have secondary anemia associated with this requiring Procrit however has done well since this time.      Assessment/Plan:    1. Clinical diagnosis of COVID-19  COVID-19 positive status after testing December 8, 2020 with onset of symptoms December 4, 2020.  We will continue to self isolate for minimum 10 days to ensure symptom improvement and afebrile greater than 24 hours prior to ability to resume normal activities.  Information was provided below regarding COVID-19 treatment recommendations.    2. Class 1 obesity due to excess calories with serious comorbidity in adult, unspecified BMI  Dietary and exercise modifications initially for goal less than 240 pounds, less than 220 pounds ideally.    3. Type 2 diabetes mellitus without complication, without long-term current use of insulin (H)  Type 2 diabetes reviewed with prior A1c of 7.3% September 1, 2020 continues to follow through the [facility].  Continues Metformin 500 mg using 2 tablets twice daily plus Januvia 100 mg daily.  Remains on simvastatin 40 mg at bedtime for lipid management as well.         Discharge Instructions for COVID-19 Patients  You have COVID-19. Please follow the instructions listed below.   If you have a weakened immune system, discuss with your doctor any other actions you need to take.  How can I protect others?  If you have symptoms (fever, cough, body aches or trouble breathing):    Stay home and away from others (self-isolate) until:  ? At  "least 10 days have passed since your symptoms started, And   ? You've had no fever--and no medicine that reduces fever--for 1 full day (24 hours), And    ? Your other symptoms have resolved (gotten better).  If you don't show symptoms, but testing showed that you have COVID-19:    Stay home and away from others (self-isolate). Follow the tips under \"How do I self-isolate?\" below for 10 days (20 days if you have a weak immune system).    You don't need to be retested for COVID-19 before going back to school or work. As long as you're fever-free and feeling better, you can go back to school, work and other activities after waiting the 10 or 20 days.   How do I self-isolate?    Stay in your own room, even for meals. Use your own bathroom if you can.    Stay away from others in your home. No hugging, kissing or shaking hands. No visitors.    Don't go to work, school or anywhere else.    Clean \"high touch\" surfaces often (doorknobs, counters, handles). Use household cleaning spray or wipes. You'll find a full list of  on the EPA website: www.epa.gov/pesticide-registration/list-n-disinfectants-use-against-sars-cov-2.    Cover your mouth and nose with a mask or other face covering to avoid spreading germs.    Wash your hands and face often. Use soap and water.    Caregivers in these groups are at risk for severe illness due to COVID-19:  ? People 65 years and older  ? People who live in a nursing home or long-term care facility  ? People with chronic disease (lung, heart, cancer, diabetes, kidney, liver, immunologic)  ? People who have a weakened immune system, including those who:    Are in cancer treatment    Take medicine that weakens the immune system, such as corticosteroids    Had a bone marrow or organ transplant    Have an immune deficiency    Have poorly controlled HIV or AIDS    Are obese (body mass index of 40 or higher)    Smoke regularly    Caregivers should wear gloves while washing dishes, handling " laundry and cleaning bedrooms and bathrooms.    Use caution when washing and drying laundry: Don't shake dirty laundry and use the warmest water setting that you can.    For more tips on managing your health at home, go to www.cdc.gov/coronavirus/2019-ncov/downloads/10Things.pdf.  How can I take care of myself at home?  1. Get lots of rest. Drink extra fluids (unless a doctor has told you not to).    2. Take Tylenol (acetaminophen) for fever or pain. If you have liver or kidney problems, ask your family doctor if it's okay to take Tylenol.     Adults can take either:  ? 650 mg (two 325 mg pills) every 4 to 6 hours, or   ? 1,000 mg (two 500 mg pills) every 8 hours as needed.  ? Note: Don't take more than 3,000 mg in one day. Acetaminophen is found in many medicines (both prescribed and over-the-counter medicines). Read all labels to be sure you don't take too much.   For children, check the Tylenol bottle for the right dose. The dose is based on the child's age or weight.  3. If you have other health problems (like cancer, heart failure, an organ transplant or severe kidney disease): Call your specialty clinic if you don't feel better in the next 2 days.    4. Know when to call 911. Emergency warning signs include:  ? Trouble breathing or shortness of breath  ? Pain or pressure in the chest that doesn't go away  ? Feeling confused like you haven't felt before, or not being able to wake up  ? Bluish-colored lips or face    5. Your doctor may have prescribed a blood thinner medicine. Follow their instructions.  Where can I get more information?     [place] - About COVID-19: [email or web address]    CDC - What to Do If You're Sick: www.cdc.gov/coronavirus/2019-ncov/about/steps-when-sick.html    CDC - Ending Home Isolation: www.cdc.gov/coronavirus/2019-ncov/hcp/disposition-in-home-patients.html    CDC - Caring for Someone: www.cdc.gov/coronavirus/2019-ncov/if-you-are-sick/care-for-someone.html    [facility] -  Interim Guidance for Hospital Discharge to Home: www.health.[facility].[place].us/diseases/coronavirus/hcp/hospdischarge.pdf    [facility] clinical trials (COVID-19 research studies): clinicalaffairs.[facility]/umn-clinical-trials    Below are the COVID-19 hotlines at the [place] Department of Health ([facility]). Interpreters are available.  ? For health questions: Call 981-306-1512 or 1-962.183.2561 (7 a.m. to 7 p.m.)  ? For questions about schools and childcare: Call 999-639-0578 or 1-302.275.3124 (7 a.m. to 7 p.m.)    For informational purposes only. Not to replace the advice of your health care provider. Clinically reviewed by the Infection Prevention Team. Copyright   2020 [employer] [facility]. All rights reserved. garbs [id number] - REV 08/04/20.           Phone call duration:  13 minutes    Gilbert Jackson MD

## 2021-06-21 NOTE — LETTER
Letter by Katie Logan LPN at      Author: Katie Logan LPN Service: -- Author Type: --    Filed:  Encounter Date: 5/20/2021 Status: (Other)       Mark Garces  [address] [place] [id number]             May 20, 2021      Dear Mark:    Please schedule a Diabetic follow up visit with Dr. Jackson either by calling our number at [phone] or by using your my chart.      To help you develop diabetes management strategies, we would like to remind you of the following important guidelines:    1) You should have blood work done at least twice yearly to monitor your Hemoglobin A1C (a three-month average of your blood sugars) and your cholesterol.     2) You should have your urine checked yearly to watch for kidney damage. Diabetes can cause kidney failure, which could require frequent dialysis.    3) You should see an eye doctor and a foot doctor yearly to help prevent diabetes blood vessel complications in your eyes and feet.    4) You should receive a yearly flu shot (between October and March). We also recommend that you receive a pneumonia shot once every ten years, or at least after age 65.    5) Make sure your blood pressure is controlled (less than 130/80). Monitoring your blood pressure with a home blood pressure cuff of your own is an excellent idea for patients with diabetes.    6) If you smoke, quitting will reduce your risk of heart attack and stroke.    7) Exercise regularly since it has beneficial effects on the heart and blood sugars. Exercising three times per week can be as important as medication to a patient with diabetes.     We have included a personalized Diabetic Report Card on the following page to help you assess what you're currently doing well and which areas need improvement.    Thank you for including us as members of your health care team.    Sincerely,  Dr. Jackson and Katie

## 2021-07-02 ENCOUNTER — RECORDS - HEALTHEAST (OUTPATIENT)
Dept: FAMILY MEDICINE | Facility: CLINIC | Age: 71
End: 2021-07-02

## 2021-07-03 DIAGNOSIS — E11.65 TYPE 2 DIABETES MELLITUS WITH HYPERGLYCEMIA, WITHOUT LONG-TERM CURRENT USE OF INSULIN (H): ICD-10-CM

## 2021-07-04 NOTE — TELEPHONE ENCOUNTER
Telephone Encounter by Shira Nichols CMA at 7/2/2021 10:54 AM     Author: Shira Nichols CMA Service: -- Author Type: Certified Medical Assistant    Filed: 7/2/2021 10:55 AM Encounter Date: 7/2/2021 Status: Signed    : Shira Nichols CMA (Certified Medical Assistant)       lmtcb- pt is due for an annual wellness visit- please schedule with Key Gatica if pt would like to be seen

## 2021-07-07 DIAGNOSIS — E11.65 TYPE 2 DIABETES MELLITUS WITH HYPERGLYCEMIA, WITHOUT LONG-TERM CURRENT USE OF INSULIN (H): ICD-10-CM

## 2021-07-07 NOTE — TELEPHONE ENCOUNTER
JANUVIA 50 MG TABLET      Last Written Prescription Date:  4/15/2020  Last Fill Quantity: 90,   # refills: 3  Last Office Visit :  6/10/2020  Future Office visit:  None    Routing refill request to provider for review/approval because:  Abnormal A1C    Refer to Provider for review   Recent Labs   Lab Test 09/01/20  0919 12/10/19   A1C 7.3*  --    HEMOGLOBINA1  --  7.2*       Recent Labs   Lab Test 09/01/20  0919 12/10/19   A1C 7.3*  --    HEMOGLOBINA1  --  7.2*         Renita Hein RN  Central Triage Red Flags/Med Refills

## 2021-09-25 ENCOUNTER — HEALTH MAINTENANCE LETTER (OUTPATIENT)
Age: 71
End: 2021-09-25

## 2021-09-28 DIAGNOSIS — E11.65 TYPE 2 DIABETES MELLITUS WITH HYPERGLYCEMIA, WITHOUT LONG-TERM CURRENT USE OF INSULIN (H): ICD-10-CM

## 2021-09-29 NOTE — TELEPHONE ENCOUNTER
METFORMIN  MG TABLET      Last Written Prescription Date:  10/19/21  Last Fill Quantity: 360,   # refills: 3  Last Office Visit : 6/10/20  Future Office visit:  none    Routing refill request to provider for review/approval because:  Overdue for appt  Overdue for lab  Hemoglobin A1C   Date Value Ref Range Status   09/01/2020 7.3 (H) 0 - 5.6 % Final     Comment:     Normal <5.7% Prediabetes 5.7-6.4%  Diabetes 6.5% or higher - adopted from ADA   consensus guidelines.       Creatinine   Date Value Ref Range Status   09/01/2020 0.90 0.66 - 1.25 mg/dL Final     JANUVIA 50 MG TABLET    Last Written Prescription Date:  7/7/21  Last Fill Quantity: 90,   # refills: 0    Routing refill request to provider for review/approval because:  See above  Message sent to schedulers  Thank-you, Jeaneth OBRIEN RN Medication Refill Team

## 2021-10-13 DIAGNOSIS — E11.65 TYPE 2 DIABETES MELLITUS WITH HYPERGLYCEMIA, WITHOUT LONG-TERM CURRENT USE OF INSULIN (H): Primary | ICD-10-CM

## 2021-11-10 DIAGNOSIS — E11.8 TYPE 2 DIABETES MELLITUS WITH COMPLICATION, WITH LONG-TERM CURRENT USE OF INSULIN (H): ICD-10-CM

## 2021-11-10 DIAGNOSIS — Z79.4 TYPE 2 DIABETES MELLITUS WITH COMPLICATION, WITH LONG-TERM CURRENT USE OF INSULIN (H): ICD-10-CM

## 2021-11-11 RX ORDER — SIMVASTATIN 40 MG
40 TABLET ORAL DAILY
Qty: 30 TABLET | Refills: 0 | Status: SHIPPED | OUTPATIENT
Start: 2021-11-11 | End: 2021-11-19

## 2021-11-11 NOTE — TELEPHONE ENCOUNTER
LCV: 6/10/2020  [facility] Endocrinology  Overdue lab LDL  NCV: 11-19-21, lab appt 11-15-21

## 2021-11-15 ENCOUNTER — LAB (OUTPATIENT)
Dept: LAB | Facility: CLINIC | Age: 71
End: 2021-11-15
Payer: MEDICARE

## 2021-11-15 DIAGNOSIS — E11.65 TYPE 2 DIABETES MELLITUS WITH HYPERGLYCEMIA, WITHOUT LONG-TERM CURRENT USE OF INSULIN (H): ICD-10-CM

## 2021-11-15 LAB
ALT SERPL W P-5'-P-CCNC: 12 U/L (ref 0–45)
AST SERPL W P-5'-P-CCNC: 11 U/L (ref 0–40)
CHOLEST SERPL-MCNC: 160 MG/DL
CREAT UR-MCNC: 32 MG/DL
FASTING STATUS PATIENT QL REPORTED: ABNORMAL
FASTING STATUS PATIENT QL REPORTED: NORMAL
GLUCOSE BLD-MCNC: 166 MG/DL (ref 70–125)
HBA1C MFR BLD: 7.4 % (ref 0–5.6)
HDLC SERPL-MCNC: 52 MG/DL
LDLC SERPL CALC-MCNC: 93 MG/DL
MICROALBUMIN UR-MCNC: 1.05 MG/DL (ref 0–1.99)
MICROALBUMIN/CREAT UR: 32.8 MG/G CR
POTASSIUM BLD-SCNC: 4.6 MMOL/L (ref 3.5–5)
TRIGL SERPL-MCNC: 74 MG/DL
TSH SERPL DL<=0.005 MIU/L-ACNC: 2.95 UIU/ML (ref 0.3–5)

## 2021-11-15 PROCEDURE — 36415 COLL VENOUS BLD VENIPUNCTURE: CPT

## 2021-11-15 PROCEDURE — 84450 TRANSFERASE (AST) (SGOT): CPT

## 2021-11-15 PROCEDURE — 84460 ALANINE AMINO (ALT) (SGPT): CPT

## 2021-11-15 PROCEDURE — 83036 HEMOGLOBIN GLYCOSYLATED A1C: CPT

## 2021-11-15 PROCEDURE — 84132 ASSAY OF SERUM POTASSIUM: CPT

## 2021-11-15 PROCEDURE — 80061 LIPID PANEL: CPT

## 2021-11-15 PROCEDURE — 82043 UR ALBUMIN QUANTITATIVE: CPT

## 2021-11-15 PROCEDURE — 84443 ASSAY THYROID STIM HORMONE: CPT

## 2021-11-15 PROCEDURE — 82947 ASSAY GLUCOSE BLOOD QUANT: CPT

## 2021-11-18 ENCOUNTER — TRANSFERRED RECORDS (OUTPATIENT)
Dept: HEALTH INFORMATION MANAGEMENT | Facility: CLINIC | Age: 71
End: 2021-11-18
Payer: MEDICARE

## 2021-11-18 LAB — RETINOPATHY: POSITIVE

## 2021-11-18 ASSESSMENT — ENCOUNTER SYMPTOMS
MYALGIAS: 0
JOINT SWELLING: 0
MUSCLE CRAMPS: 1
MUSCLE WEAKNESS: 0
NECK PAIN: 0
ARTHRALGIAS: 0
BACK PAIN: 0

## 2021-11-18 NOTE — PROGRESS NOTES
Mark is a 71 year old who is being evaluated via a billable video visit.      How would you like to obtain your AVS? MyChart  If the video visit is dropped, the invitation should be resent by: Text to cell phone: [phone]  Will anyone else be joining your video visit? No      Video Start Time:   Video-Visit Details    Type of service:  Video Visit    Video End Time:    Originating Location (pt. Location):     Distant Location (provider location):  [facility] ENDOCRINOLOGY [facility]     Platform used for Video Visit: Outcome for 11/18/21 9:52 AM: Spoke to patient. He stated he is not required to check his sugars.   Michelle Macedo, CMA

## 2021-11-19 ENCOUNTER — VIRTUAL VISIT (OUTPATIENT)
Dept: ENDOCRINOLOGY | Facility: CLINIC | Age: 71
End: 2021-11-19
Payer: MEDICARE

## 2021-11-19 DIAGNOSIS — E11.65 TYPE 2 DIABETES MELLITUS WITH HYPERGLYCEMIA, WITHOUT LONG-TERM CURRENT USE OF INSULIN (H): ICD-10-CM

## 2021-11-19 DIAGNOSIS — E11.65 TYPE 2 DIABETES MELLITUS WITH HYPERGLYCEMIA, WITHOUT LONG-TERM CURRENT USE OF INSULIN (H): Primary | ICD-10-CM

## 2021-11-19 DIAGNOSIS — E11.8 TYPE 2 DIABETES MELLITUS WITH COMPLICATION, WITH LONG-TERM CURRENT USE OF INSULIN (H): ICD-10-CM

## 2021-11-19 DIAGNOSIS — Z23 NEED FOR VACCINATION: Primary | ICD-10-CM

## 2021-11-19 DIAGNOSIS — Z79.4 TYPE 2 DIABETES MELLITUS WITH COMPLICATION, WITH LONG-TERM CURRENT USE OF INSULIN (H): ICD-10-CM

## 2021-11-19 PROCEDURE — 99215 OFFICE O/P EST HI 40 MIN: CPT | Mod: 95 | Performed by: PHYSICIAN ASSISTANT

## 2021-11-19 RX ORDER — SIMVASTATIN 40 MG
TABLET ORAL
Qty: 90 TABLET | Refills: 3 | COMMUNITY
Start: 2021-11-19 | End: 2021-12-08

## 2021-11-19 NOTE — LETTER
11/19/2021       RE: Mark Garces  [address] [place] [id number]     Dear Colleague,    Thank you for referring your patient, Mark Garces, to the [facility] ENDOCRINOLOGY CLINIC [place] [facility]. Please see a copy of my visit note below.    Mark is a 71 year old who is being evaluated via a billable video visit.      How would you like to obtain your AVS? MyChart  If the video visit is dropped, the invitation should be resent by: Text to cell phone: [phone]  Will anyone else be joining your video visit? No      Video Start Time:   Video-Visit Details    Type of service:  Video Visit    Video End Time:    Originating Location (pt. Location):     Distant Location (provider location):  [facility] ENDOCRINOLOGY [facility]     Platform used for Video Visit: Outcome for 11/18/21 9:52 AM: Spoke to patient. He stated he is not required to check his sugars.   Michelle Macedo, GAGE        Due to the COVID 19 pandemic this visit was converted to a video visit in order to help prevent spread of infection in this patient and the general population.    Time of start: 11:45 am  Time of end: 12:08 pm  Total duration of video visit: 23 minutes.    HPI   Mark Garces is a 71 year old male with type 2 diabetes mellitus. Video visit for diabetes follow up.  Pt's hx is significant for type 2 DM dx in 2007, obesity, hyperlipidemia and past hx of Hepatitis C which has been treated successfully.  He was recently seen by Oph and was told he has diabetic retinopathy.  He also has a hx of intermittent microalbuminuria.  No sx of diabetic neuropathy.  For his diabetes, he is currently taking Metformin 500 mg 2 tabs po BID and Januvia 50 mg daily.  Pt's A1C was 7.4 % on 11/15/2021.  Mark has not been checking his blood sugar.  Pt states he is not checking his blood sugar at home because he does not like to stick himself with the lancets. I asked  him to reconsider using a Freestyle Ama sensor to monitor his blood sugars daily.  On ROS today, he reports feeling well.  He has chronic back, hip and right leg pain.    Pt denies frequent headaches, blurred vision,  n/v, SOB at rest, cough, fever, chills or chest pain.  No abd pain, diarrhea, dysuria, hematuria or foot ulcers.  Some numbness and pain intermittently in this right leg. He has hx chronic back issues.  No stool/urine incontinence.  He denies any numbness, tingling or pain in his left foot/leg.  Mark states he had COVID19 in Nov 2020. He was playing in a bad in a bar in [place].  Pt has received the COVID vaccine ( J & J ) and plans to get the COVID booster.    DIABETES CARE:  Retinopathy: yes; seen by Oph yesterday at [facility] in [place] and he was told he has diabetic retinopathy.  Nephropathy: urine microalbuminuria + intermittently.  I have no BP today. He will be having a physical exam on 11/30 and BP will be checked at that time. Consider adding low dose ace or ARB.  Neuropathy: none.  Foot exam: no exam today.  Taking ASA: yes.  Lipids: LDL 93 in Nov 2021 on Simvastatin.  Insulin: none. DM meds: Metformin and Januvia.  Testing: not testing.  He will consider the Freestyle Ama sensor.    ROS   Please see under HPI.     ALLERGIES:   No Known Allergies      Prescription Medications as of 11/19/2021       Rx Number Disp Refills Start End Last Dispensed Date Next Fill Date Owning Pharmacy    aspirin 81 MG tablet            Sig: Take 1 tablet by mouth daily.    Class: Historical    Route: Oral    blood glucose (ONETOUCH ULTRA) test strip  250 strip 4 11/19/2021    [facility]/pharmacy #[id number] [facility] [address] [facility]    Sig: Use to test blood sugar twice daily.    Class: Historical    metFORMIN (GLUCOPHAGE) 500 MG tablet  360 tablet 3 11/19/2021    [facility]/pharmacy #[id number] [place], [place] - [address] [facility]    Sig: Take 2 tabs po BID.    Class: E-Prescribe    Omega-3 Fatty Acids (FISH OIL) 500  MG CAPS            Sig: Take 2 capsules by mouth daily.    Class: Historical    Route: Oral    simvastatin (ZOCOR) 40 MG tablet  90 tablet 3 11/19/2021    [facility]/pharmacy #[id number] [facility] [address] [facility]    Sig: Take one tab daily.    Class: Historical    sitagliptin (JANUVIA) 100 MG tablet  90 tablet 3 11/19/2021    [facility]/pharmacy #[id number] [place], [place] - [address] [facility]    Sig: Take 1 tablet (100 mg) by mouth daily    Class: E-Prescribe    Route: Oral        Family Hx   No family hx of diabetes.    Personal Hx   Smoke: None at this time.  ETOH : None at this time.    PMH   1. Type 2 Diabetes Mellitus dx in 2007.   2. Hepatitis C; pt underwent tx for Hep C and his Hep C has cleared.  3. Obesity.  4. Past hx of nicotine use.   5. Hx of undescended testis s/p surgery in 1971.   6. Anemia while undergoing hep C treatment.  7. Retinopathy.  8. Microalbuminuria.  9. Hyperlipidemia.      Physical Exam   No exam today.    Results   Creatinine   Date Value Ref Range Status   09/01/2020 0.90 0.66 - 1.25 mg/dL Final     GFR Estimate   Date Value Ref Range Status   09/01/2020 86 >60 mL/min/[1.73_m2] Final     Comment:     Non  GFR Calc  Starting 12/18/2018, serum creatinine based estimated GFR (eGFR) will be   calculated using the Chronic Kidney Disease Epidemiology Collaboration   (CKD-EPI) equation.       Hemoglobin A1C   Date Value Ref Range Status   11/15/2021 7.4 (H) 0.0 - 5.6 % Final     Comment:     Normal <5.7%   Prediabetes 5.7-6.4%    Diabetes 6.5% or higher     Note: Adopted from ADA consensus guidelines.   09/01/2020 7.3 (H) 0 - 5.6 % Final     Comment:     Normal <5.7% Prediabetes 5.7-6.4%  Diabetes 6.5% or higher - adopted from ADA   consensus guidelines.       Potassium   Date Value Ref Range Status   11/15/2021 4.6 3.5 - 5.0 mmol/L Final   04/09/2019 4.0 3.4 - 5.3 mmol/L Final     ALT   Date Value Ref Range Status   11/15/2021 12 0 - 45 U/L Final   09/01/2020 18 0 - 70 U/L Final     AST    Date Value Ref Range Status   11/15/2021 11 0 - 40 U/L Final   09/01/2020 7 0 - 45 U/L Final     TSH   Date Value Ref Range Status   11/15/2021 2.95 0.30 - 5.00 uIU/mL Final   09/01/2020 2.85 0.40 - 4.00 mU/L Final     T4 Free   Date Value Ref Range Status   09/07/2017 1.02 0.76 - 1.46 ng/dL Final         Cholesterol   Date Value Ref Range Status   11/15/2021 160 <=199 mg/dL Final   09/01/2020 146 <200 mg/dL Final   04/09/2019 152 <200 mg/dL Final     HDL Cholesterol   Date Value Ref Range Status   09/01/2020 53 >39 mg/dL Final   04/09/2019 55 >39 mg/dL Final     Direct Measure HDL   Date Value Ref Range Status   11/15/2021 52 >=40 mg/dL Final     Comment:     HDL Cholesterol Reference Range:     0-2 years:   No reference ranges established for patients under 2 years old  at [facility] for lipid analytes.    2-8 years:  Greater than 45 mg/dL     18 years and older:   Female: Greater than or equal to 50 mg/dL   Male:   Greater than or equal to 40 mg/dL     LDL Cholesterol Calculated   Date Value Ref Range Status   11/15/2021 93 <=129 mg/dL Final   09/01/2020 79 <100 mg/dL Final     Comment:     Desirable:       <100 mg/dl   04/09/2019 77 <100 mg/dL Final     Comment:     Desirable:       <100 mg/dl     Triglycerides   Date Value Ref Range Status   11/15/2021 74 <=149 mg/dL Final   09/01/2020 71 <150 mg/dL Final   04/09/2019 96 <150 mg/dL Final     Cholesterol/HDL Ratio   Date Value Ref Range Status   08/20/2014 2.8 0.0 - 5.0 Final   11/19/2013 3.7 0.0 - 5.0 Final         A1C    7.4 % on  11/15/2021    ASSESSMENT/PLAN:     1. TYPE 2 DIABETES MELLITUS: Patient's A1C is higher at 7.4 %.  I have no blood sugar readings since Mark does not check his blood sugar.  I asked him to reconsider using a Freestyle Ama sensor to monitor his blood sugar.  Increase Januvia 100 mg daily and continue Metformin 1000 mg BID.   Pt's creat was 0.9 with GFR 86 mL/min in Fall 2020.  He denies sx of neuropathy.  No vitals  today.  Pt received the COVID vaccines (J & J ) and plans to get the COVID booster and flu vaccine.    2.  RETINOPATHY: Seen by Oph 11/18/2021 and was told he has diabetic retinopathy.    3.  MICROALBUMINURIA: Intermittent microalbuminuria.  I have no BP today.  He will have his BP checked on 11/30 at his annual exam appointment. Consider adding low dose ace or ARB.   Most recent creat/GFR as above.    4.  HYPERLIPIDEMIA: LDL 93 and HDL 52 on 11/15/2021.  Pt is taking Simvastatin and fish oil.  5. OVERWEIGHT: Encouraged pt to make healthy food choices and remain active.  6.  HX OF HEPATITIS C: Pt has completed his hep C treatment and states his hep C has clear.    7. FOLLOW UP: With me in 6 months.    Time spent reviewing chart and labs today = 5 minutes.  Time for video visit today= 23 minutes.  Time for documentation today = 15 minutes.    TOTAL TIME FOR VISIT TODAY = 43 minutes.  Elizabeth Guzmna PA-C  Answers for HPI/ROS submitted by the patient on 11/18/2021  General Symptoms: No  Skin Symptoms: No  HENT Symptoms: No  EYE SYMPTOMS: No  HEART SYMPTOMS: No  LUNG SYMPTOMS: No  INTESTINAL SYMPTOMS: No  URINARY SYMPTOMS: No  REPRODUCTIVE SYMPTOMS: No  SKELETAL SYMPTOMS: Yes  BLOOD SYMPTOMS: No  NERVOUS SYSTEM SYMPTOMS: No  MENTAL HEALTH SYMPTOMS: No  Back pain: No  Muscle aches: No  Neck pain: No  Swollen joints: No  Joint pain: No  Bone pain: No  Muscle cramps: Yes  Muscle weakness: No

## 2021-11-19 NOTE — PROGRESS NOTES
Due to the COVID 19 pandemic this visit was converted to a video visit in order to help prevent spread of infection in this patient and the general population.    Time of start: 11:45 am  Time of end: 12:08 pm  Total duration of video visit: 23 minutes.    HPI   Mark Garces is a 71 year old male with type 2 diabetes mellitus. Video visit for diabetes follow up.  Pt's hx is significant for type 2 DM dx in 2007, obesity, hyperlipidemia and past hx of Hepatitis C which has been treated successfully.  He was recently seen by Oph and was told he has diabetic retinopathy.  He also has a hx of intermittent microalbuminuria.  No sx of diabetic neuropathy.  For his diabetes, he is currently taking Metformin 500 mg 2 tabs po BID and Januvia 50 mg daily.  Pt's A1C was 7.4 % on 11/15/2021.  Mark has not been checking his blood sugar.  Pt states he is not checking his blood sugar at home because he does not like to stick himself with the lancets. I asked him to reconsider using a Freestyle Ama sensor to monitor his blood sugars daily.  On ROS today, he reports feeling well.  He has chronic back, hip and right leg pain.    Pt denies frequent headaches, blurred vision,  n/v, SOB at rest, cough, fever, chills or chest pain.  No abd pain, diarrhea, dysuria, hematuria or foot ulcers.  Some numbness and pain intermittently in this right leg. He has hx chronic back issues.  No stool/urine incontinence.  He denies any numbness, tingling or pain in his left foot/leg.  Mark states he had COVID19 in Nov 2020. He was playing in a bad in a bar in [place].  Pt has received the COVID vaccine ( J & J ) and plans to get the COVID booster.    DIABETES CARE:  Retinopathy: yes; seen by Oph yesterday at [facility] and he was told he has diabetic retinopathy.  Nephropathy: urine microalbuminuria + intermittently.  I have no BP today. He will be having a physical exam on 11/30 and BP will be checked at that time. Consider adding  low dose ace or ARB.  Neuropathy: none.  Foot exam: no exam today.  Taking ASA: yes.  Lipids: LDL 93 in Nov 2021 on Simvastatin.  Insulin: none. DM meds: Metformin and Januvia.  Testing: not testing.  He will consider the Freestyle Ama sensor.    ROS   Please see under HPI.     ALLERGIES:   No Known Allergies      Prescription Medications as of 11/19/2021       Rx Number Disp Refills Start End Last Dispensed Date Next Fill Date Owning Pharmacy    aspirin 81 MG tablet            Sig: Take 1 tablet by mouth daily.    Class: Historical    Route: Oral    blood glucose (ONETOUCH ULTRA) test strip  250 strip 4 11/19/2021    [facility]/pharmacy [address] [place] [address]    Sig: Use to test blood sugar twice daily.    Class: Historical    metFORMIN (GLUCOPHAGE) 500 MG tablet  360 tablet 3 11/19/2021    [facility]/pharmacy [address] [place] [address]    Sig: Take 2 tabs po BID.    Class: E-Prescribe    Omega-3 Fatty Acids (FISH OIL) 500 MG CAPS            Sig: Take 2 capsules by mouth daily.    Class: Historical    Route: Oral    simvastatin (ZOCOR) 40 MG tablet  90 tablet 3 11/19/2021    [facility]/pharmacy [address] [place] [address]    Sig: Take one tab daily.    Class: Historical    sitagliptin (JANUVIA) 100 MG tablet  90 tablet 3 11/19/2021    [facility]/pharmacy [address] [place] [address]    Sig: Take 1 tablet (100 mg) by mouth daily    Class: E-Prescribe    Route: Oral        Family Hx   No family hx of diabetes.    Personal Hx   Smoke: None at this time.  ETOH : None at this time.    PMH   1. Type 2 Diabetes Mellitus dx in 2007.   2. Hepatitis C; pt underwent tx for Hep C and his Hep C has cleared.  3. Obesity.  4. Past hx of nicotine use.   5. Hx of undescended testis s/p surgery in 1971.   6. Anemia while undergoing hep C treatment.  7. Retinopathy.  8. Microalbuminuria.  9. Hyperlipidemia.      Physical Exam   No exam today.    Results   Creatinine   Date Value Ref Range Status    09/01/2020 0.90 0.66 - 1.25 mg/dL Final     GFR Estimate   Date Value Ref Range Status   09/01/2020 86 >60 mL/min/[1.73_m2] Final     Comment:     Non  GFR Calc  Starting 12/18/2018, serum creatinine based estimated GFR (eGFR) will be   calculated using the Chronic Kidney Disease Epidemiology Collaboration   (CKD-EPI) equation.       Hemoglobin A1C   Date Value Ref Range Status   11/15/2021 7.4 (H) 0.0 - 5.6 % Final     Comment:     Normal <5.7%   Prediabetes 5.7-6.4%    Diabetes 6.5% or higher     Note: Adopted from ADA consensus guidelines.   09/01/2020 7.3 (H) 0 - 5.6 % Final     Comment:     Normal <5.7% Prediabetes 5.7-6.4%  Diabetes 6.5% or higher - adopted from ADA   consensus guidelines.       Potassium   Date Value Ref Range Status   11/15/2021 4.6 3.5 - 5.0 mmol/L Final   04/09/2019 4.0 3.4 - 5.3 mmol/L Final     ALT   Date Value Ref Range Status   11/15/2021 12 0 - 45 U/L Final   09/01/2020 18 0 - 70 U/L Final     AST   Date Value Ref Range Status   11/15/2021 11 0 - 40 U/L Final   09/01/2020 7 0 - 45 U/L Final     TSH   Date Value Ref Range Status   11/15/2021 2.95 0.30 - 5.00 uIU/mL Final   09/01/2020 2.85 0.40 - 4.00 mU/L Final     T4 Free   Date Value Ref Range Status   09/07/2017 1.02 0.76 - 1.46 ng/dL Final         Cholesterol   Date Value Ref Range Status   11/15/2021 160 <=199 mg/dL Final   09/01/2020 146 <200 mg/dL Final   04/09/2019 152 <200 mg/dL Final     HDL Cholesterol   Date Value Ref Range Status   09/01/2020 53 >39 mg/dL Final   04/09/2019 55 >39 mg/dL Final     Direct Measure HDL   Date Value Ref Range Status   11/15/2021 52 >=40 mg/dL Final     Comment:     HDL Cholesterol Reference Range:     0-2 years:   No reference ranges established for patients under 2 years old  at [facility] for lipid analytes.    2-8 years:  Greater than 45 mg/dL     18 years and older:   Female: Greater than or equal to 50 mg/dL   Male:   Greater than or equal to 40 mg/dL     LDL  Cholesterol Calculated   Date Value Ref Range Status   11/15/2021 93 <=129 mg/dL Final   09/01/2020 79 <100 mg/dL Final     Comment:     Desirable:       <100 mg/dl   04/09/2019 77 <100 mg/dL Final     Comment:     Desirable:       <100 mg/dl     Triglycerides   Date Value Ref Range Status   11/15/2021 74 <=149 mg/dL Final   09/01/2020 71 <150 mg/dL Final   04/09/2019 96 <150 mg/dL Final     Cholesterol/HDL Ratio   Date Value Ref Range Status   08/20/2014 2.8 0.0 - 5.0 Final   11/19/2013 3.7 0.0 - 5.0 Final         A1C    7.4 % on  11/15/2021    ASSESSMENT/PLAN:     1. TYPE 2 DIABETES MELLITUS: Patient's A1C is higher at 7.4 %.  I have no blood sugar readings since Mark does not check his blood sugar.  I asked him to reconsider using a Freestyle Ama sensor to monitor his blood sugar.  Increase Januvia 100 mg daily and continue Metformin 1000 mg BID.   Pt's creat was 0.9 with GFR 86 mL/min in Fall 2020.  He denies sx of neuropathy.  No vitals today.  Pt received the COVID vaccines (J & J ) and plans to get the COVID booster and flu vaccine.    2.  RETINOPATHY: Seen by Oph 11/18/2021 and was told he has diabetic retinopathy.    3.  MICROALBUMINURIA: Intermittent microalbuminuria.  I have no BP today.  He will have his BP checked on 11/30 at his annual exam appointment. Consider adding low dose ace or ARB.   Most recent creat/GFR as above.    4.  HYPERLIPIDEMIA: LDL 93 and HDL 52 on 11/15/2021.  Pt is taking Simvastatin and fish oil.    5. OVERWEIGHT: Encouraged pt to make healthy food choices and remain active.    6.  HX OF HEPATITIS C: Pt has completed his hep C treatment and states his hep C has clear.      7. FOLLOW UP: With me in 6 months.    Time spent reviewing chart and labs today = 5 minutes.  Time for video visit today= 23 minutes.  Time for documentation today = 15 minutes.    TOTAL TIME FOR VISIT TODAY = 43 minutes.    Elizabeth Guzman PA-C                Answers for HPI/ROS submitted by the patient on  11/18/2021  General Symptoms: No  Skin Symptoms: No  HENT Symptoms: No  EYE SYMPTOMS: No  HEART SYMPTOMS: No  LUNG SYMPTOMS: No  INTESTINAL SYMPTOMS: No  URINARY SYMPTOMS: No  REPRODUCTIVE SYMPTOMS: No  SKELETAL SYMPTOMS: Yes  BLOOD SYMPTOMS: No  NERVOUS SYSTEM SYMPTOMS: No  MENTAL HEALTH SYMPTOMS: No  Back pain: No  Muscle aches: No  Neck pain: No  Swollen joints: No  Joint pain: No  Bone pain: No  Muscle cramps: Yes  Muscle weakness: No

## 2021-11-29 PROBLEM — E78.5 DYSLIPIDEMIA: Status: ACTIVE | Noted: 2021-11-29

## 2021-11-29 ASSESSMENT — ACTIVITIES OF DAILY LIVING (ADL): CURRENT_FUNCTION: NO ASSISTANCE NEEDED

## 2021-11-30 ENCOUNTER — OFFICE VISIT (OUTPATIENT)
Dept: FAMILY MEDICINE | Facility: CLINIC | Age: 71
End: 2021-11-30
Payer: MEDICARE

## 2021-11-30 VITALS
SYSTOLIC BLOOD PRESSURE: 124 MMHG | DIASTOLIC BLOOD PRESSURE: 74 MMHG | HEART RATE: 74 BPM | OXYGEN SATURATION: 97 % | WEIGHT: 258 LBS | HEIGHT: 71 IN | BODY MASS INDEX: 36.12 KG/M2

## 2021-11-30 DIAGNOSIS — M25.551 HIP PAIN, RIGHT: ICD-10-CM

## 2021-11-30 DIAGNOSIS — Z00.00 ROUTINE GENERAL MEDICAL EXAMINATION AT A HEALTH CARE FACILITY: Primary | ICD-10-CM

## 2021-11-30 DIAGNOSIS — Z23 HIGH PRIORITY FOR 2019-NCOV VACCINE: ICD-10-CM

## 2021-11-30 DIAGNOSIS — Z12.11 COLON CANCER SCREENING: ICD-10-CM

## 2021-11-30 DIAGNOSIS — E66.01 MORBID OBESITY (H): ICD-10-CM

## 2021-11-30 DIAGNOSIS — Z86.19 HISTORY OF HEPATITIS C: ICD-10-CM

## 2021-11-30 PROCEDURE — 91306 COVID-19,PF,MODERNA (18+ YRS BOOSTER .25ML): CPT | Performed by: FAMILY MEDICINE

## 2021-11-30 PROCEDURE — 0064A COVID-19,PF,MODERNA (18+ YRS BOOSTER .25ML): CPT | Performed by: FAMILY MEDICINE

## 2021-11-30 PROCEDURE — G0439 PPPS, SUBSEQ VISIT: HCPCS | Performed by: FAMILY MEDICINE

## 2021-11-30 ASSESSMENT — MIFFLIN-ST. JEOR: SCORE: 1952.41

## 2021-11-30 NOTE — PROGRESS NOTES
SUBJECTIVE:   Mark Garces is a 71 year old male who presents for Preventive Visit.    His medical history includes type 2 diabetes for which she sees endocrinology at [facility]. Recent consultations and laboratory testing are reviewed. Patient has a plan to improve diet and activity. He plans to be more completely retired in the near future. Other medical history includes a history of hepatitis C for which she has been treated.    He is dealing with some right hip pain. This tends to limit activities to some extent as he gets sore with more prolonged walking. Will often bother him at night.    He does have nocturia on average twice nightly. Thinks this may be more related to increased blood sugars than anything else. Does not report other urinary symptoms. Discussed BPH. Also discussed extensively prostate cancer screening and based on concerns associated with screening especially at initiating at this point in his life we have elected to forego initiation of prostate cancer screening.    He does snore. He does report someone had told him he has a history of apnea. He does have disrupted sleep and sometimes feels tired. He would like to be evaluated for sleep apnea.    He does report symptoms consistent with mild carpal tunnel syndrome in the right hand, discussed continued use of a wrist brace which is proven helpful.    He has a history of chronic neck and back pain but reports that he has no significant pain issues at this point in time.    We discussed colon cancer screening, he has not had screening up to this point he is willing to have a colonoscopy and this will be arranged.    We discussed immunizations. He would like to have a Covid booster dose but defer other immunizations including Prevnar 13, DTaP, shingles and influenza.    No indication for lab testing based on recent tests performed at his endocrinologist.    Other medical history includes a past history of undescended testicle at  birth for which he underwent surgery in college. As stated had treatment for hepatitis C. Had high risk behaviors in the 1980s. Was treated in .    He is currently retired. 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 He is currently not  he has no children. He is a former smoker quit 10 years ago. Quit alcohol in .    Family history mother  at age 94 of old age, she did have breast cancer. Father  at age 86 of dementia. Patient has three older sisters one of whom suffers from Alzheimer's dementia.    Patient has been advised of split billing requirements and indicates understanding: Yes   Are you in the first 12 months of your Medicare coverage?  No  Do you feel safe in your environment? Yes    Have you ever done Advance Care Planning? (For example, a Health Directive, POLST, or a discussion with a medical provider or your loved ones about your wishes):     Cognitive Screening   1) Repeat 3 items (Leader, Season, Table)    2) Clock draw: NORMAL  3) 3 item recall: 2  Results: 2 items recalled: COGNITIVE IMPAIRMENT LESS LIKELY    Mini-CogTM Copyright S Rajat. Licensed by the author for use in [facility]; reprinted with permission (sojose@[facility]). All rights reserved.      Do you have sleep apnea, excessive snoring or daytime drowsiness?: yes    Reviewed and updated as needed this visit by clinical staff    Reviewed and updated as needed this visit by Provider    Social History     Tobacco Use     Smoking status: Former Smoker     Quit date: 2011     Years since quitting: 10.0     Smokeless tobacco: Never Used   Substance Use Topics     Alcohol use: No     If you drink alcohol do you typically have >3 drinks per day or >7 drinks per week? Yes      Alcohol Use 2021   Prescreen: >3 drinks/day or >7 drinks/week? -   Prescreen: >3 drinks/day or >7 drinks/week? Yes     Current providers sharing in care for this patient include:   Patient Care Team:  Omid  "SPIKE Allen CNP as PCP - General (Nurse Practitioner)  Benny Townsend MD as MD (Gastroenterology)  Elizabeth Guzman PA-C as Physician Assistant (Physician Assistant)  Elizabeth Guzman PA-C as Assigned Endocrinology Provider  Gilbert Jackson MD as Assigned PCP    The following health maintenance items are reviewed in Epic and correct as of today:  Health Maintenance Due   Topic Date Due     ANNUAL REVIEW OF HM ORDERS  Never done     ADVANCE CARE PLANNING  Never done     EYE EXAM  Never done     COLORECTAL CANCER SCREENING  Never done     DTAP/TDAP/TD IMMUNIZATION (1 - Tdap) Never done     ZOSTER IMMUNIZATION (1 of 2) Never done     LUNG CANCER SCREENING  Never done     BMP  05/14/2014     FALL RISK ASSESSMENT  Never done     AORTIC ANEURYSM SCREENING (SYSTEM ASSIGNED)  Never done     Pneumococcal Vaccine: 65+ Years (2 of 2 - PPSV23) 11/27/2017     DIABETIC FOOT EXAM  04/09/2020     COVID-19 Vaccine (2 - Booster for Ruben series) 05/03/2021     INFLUENZA VACCINE (1) 09/01/2021               Review of Systems  Complete review of systems is obtained.  Other than the specific considerations noted above complete review of systems is negative.      OBJECTIVE:   /74   Pulse 74   Ht 1.803 m (5' 11\")   Wt 117 kg (258 lb)   SpO2 97%   BMI 35.98 kg/m   Estimated body mass index is 35.98 kg/m  as calculated from the following:    Height as of this encounter: 1.803 m (5' 11\").    Weight as of this encounter: 117 kg (258 lb).  Physical Exam          General Appearance:    Alert, cooperative, no distress   Eyes:   No scleral icterus or conjunctival irritation       Ears:    Normal TM's and external ear canals, both ears   Throat:   Lips, mucosa, and tongue normal; teeth and gums normal   Neck:   Supple, symmetrical, trachea midline, no adenopathy;        thyroid:  No enlargement/tenderness/nodules   Lungs:     Clear to auscultation bilaterally, respirations unlabored, no wheezes or crackles   Heart:    " "Regular rate and rhythm,  No murmur   Abdomen:    Soft, no distention, no tenderness on palpation, no masses, no organomegaly     Extremities:  No edema, no joint swelling or redness, no evidence of any injuries   Skin:  No concerning skin findings, no suspicious moles, no rashes   Neurologic:  On gross examination there is no motor or sensory deficit.  Patient walks with a normal gait         ASSESSMENT / PLAN:   Mark was seen today for wellness visit, sleep apnea, imm/inj, imm/inj and imm/inj.    Diagnoses and all orders for this visit:    Routine general medical examination at a health care facility    High priority for 2019-nCoV vaccine  -     COVID-19,PF,MODERNA (18+ Yrs BOOSTER .25mL)    Morbid obesity (H)    History of hepatitis C    Hip pain, right    Colon cancer screening  -     Adult Gastro Ref - Procedure Only; Future           Patient has been advised of split billing requirements and indicates understanding: Yes  COUNSELING:  Reviewed preventive health counseling, as reflected in patient instructions       Regular exercise       Healthy diet/nutrition       Vision screening       Hearing screening       Dental care       Colon cancer screening       Prostate cancer screening    Estimated body mass index is 35.98 kg/m  as calculated from the following:    Height as of this encounter: 1.803 m (5' 11\").    Weight as of this encounter: 117 kg (258 lb).    Weight management plan: Discussed healthy diet and exercise guidelines    He reports that he quit smoking about 10 years ago. He has never used smokeless tobacco.      Appropriate preventive services were discussed with this patient, including applicable screening as appropriate for cardiovascular disease, diabetes, osteopenia/osteoporosis, and glaucoma.  As appropriate for age/gender, discussed screening for colorectal cancer, prostate cancer, breast cancer, and cervical cancer. Checklist reviewing preventive services available has been given to the " "patient.    Reviewed patients plan of care and provided an AVS. The Basic Care Plan (routine screening as documented in Health Maintenance) for Mark meets the Care Plan requirement. This Care Plan has been established and reviewed with the Patient.    Counseling Resources:  ATP IV Guidelines  Pooled Cohorts Equation Calculator  Breast Cancer Risk Calculator  Breast Cancer: Medication to Reduce Risk  FRAX Risk Assessment  ICSI Preventive Guidelines  Dietary Guidelines for Americans, 2010  [employer]'s MyPlate  ASA Prophylaxis  Lung CA Screening    Eloy Anne MD, MD  [facility]    Identified Health Risks:  Answers for HPI/ROS submitted by the patient on 11/29/2021  In general, how would you rate your overall physical health?: fair  Frequency of exercise:: None  Do you usually eat at least 4 servings of fruit and vegetables a day, include whole grains & fiber, and avoid regularly eating high fat or \"junk\" foods? : Yes  Taking medications regularly:: Yes  Medication side effects:: Lightheadedness  Activities of Daily Living: no assistance needed  Home safety: no safety concerns identified  Hearing Impairment:: no hearing concerns  In the past 6 months, have you been bothered by leaking of urine?: No  In general, how would you rate your overall mental or emotional health?: good  Additional concerns today:: No      "

## 2021-12-07 DIAGNOSIS — E11.8 TYPE 2 DIABETES MELLITUS WITH COMPLICATION, WITH LONG-TERM CURRENT USE OF INSULIN (H): ICD-10-CM

## 2021-12-07 DIAGNOSIS — Z79.4 TYPE 2 DIABETES MELLITUS WITH COMPLICATION, WITH LONG-TERM CURRENT USE OF INSULIN (H): ICD-10-CM

## 2021-12-08 RX ORDER — SIMVASTATIN 40 MG
40 TABLET ORAL DAILY
Qty: 90 TABLET | Refills: 3 | Status: SHIPPED | OUTPATIENT
Start: 2021-12-08 | End: 2022-12-14

## 2021-12-08 NOTE — TELEPHONE ENCOUNTER
simvastatin (ZOCOR) 40 MG tablet      HISTORICAL ONLY ON MEDICATION LIST    Last Office Visit : 11-  Future Office visit:  5-    Routing refill request to provider for review/approval because:  Medication is reported/historical.      Kathleen M Doege RN

## 2022-03-01 ENCOUNTER — TRANSFERRED RECORDS (OUTPATIENT)
Dept: HEALTH INFORMATION MANAGEMENT | Facility: CLINIC | Age: 72
End: 2022-03-01
Payer: MEDICARE

## 2022-05-10 NOTE — PROGRESS NOTES
Outcome for 05/10/22 1:40 PM: Mychart message sent  DARVIN Hernandez  Outcome for 05/11/22 2:31 PM: Patient is not checking blood sugars  Mikayla Thakur, VF

## 2022-05-17 ENCOUNTER — TELEPHONE (OUTPATIENT)
Dept: ENDOCRINOLOGY | Facility: CLINIC | Age: 72
End: 2022-05-17

## 2022-05-17 ENCOUNTER — VIRTUAL VISIT (OUTPATIENT)
Dept: ENDOCRINOLOGY | Facility: CLINIC | Age: 72
End: 2022-05-17
Payer: MEDICARE

## 2022-05-17 DIAGNOSIS — E11.65 TYPE 2 DIABETES MELLITUS WITH HYPERGLYCEMIA, WITHOUT LONG-TERM CURRENT USE OF INSULIN (H): Primary | ICD-10-CM

## 2022-05-17 PROCEDURE — 99214 OFFICE O/P EST MOD 30 MIN: CPT | Mod: 95 | Performed by: PHYSICIAN ASSISTANT

## 2022-05-17 NOTE — TELEPHONE ENCOUNTER
----- Message from Vinicius Boyd sent at 5/17/2022 11:31 AM CDT -----  Guillermo Pozo!    I spoke to Mark and informed him of the cash price for Freestyle Ama Sensors. I ran test claims for both the Ama 2 and the 14 day formulation and received a price of $264.51/month for each. I also ran test claims on the Dexcom Transmitter and Sensors which each had costs of around $450. Pt stated that he will think about the information that was given to him and reach out to the clinic if he decides to move forward with out of pocket Ama.    ThanksVinicius  [facility]  [phone] Phone  [phone] Fax  [email or web address]     ----- Message -----  From: Vinicius Boyd  Sent: 5/17/2022  10:50 AM CDT  To: Vinicius Boyd      ----- Message -----  From: Nohelia Dorsey RN  Sent: 5/17/2022  10:50 AM CDT  To: [facility] [place]      ----- Message -----  From: Elizabeth Guzman PA-C  Sent: 5/17/2022  10:25 AM CDT  To: Nohelia Dorsey RN    Can pharmacy staff call pt and give him the cost of a Freestyle Ama sensor.  His insurance will not pay for this sensor, but he is thinking about paying for it out of pocket.  Thanks  Janine

## 2022-05-17 NOTE — TELEPHONE ENCOUNTER
Attempted to reach patient to schedule follow up in the Endocrinology Clinic. No answer, LM on VM to call office back.    Schedule with VIDHYA Mcdonald in 6 months. Future labs ordered. Lou Trevizo on 5/17/2022 at 2:26 PM

## 2022-05-17 NOTE — NURSING NOTE
Patient denies any changes since echeck-in regarding medication and allergies and states all information entered during echeck-in remains accurate.  Lou Trevizo on 5/17/2022 at 9:50 AM

## 2022-05-17 NOTE — PROGRESS NOTES
Mark Garcse  is being evaluated via a billable video visit.      How would you like to obtain your AVS? [employer]  For the video visit, send the invitation by: Text to cell phone: [phone]  Will anyone else be joining your video visit? No      Due to the COVID 19 pandemic this visit was converted to a video visit in order to help prevent spread of infection in this patient and the general population.    Time of start: 9:54 am  Time of end: 10:10 am  Total duration of video visit: 16 minutes.    HPI   Mark Garces is a 71 year old male with type 2 diabetes mellitus. Video visit for diabetes follow up.  Pt's hx is significant for type 2 DM dx in 2007, obesity, hyperlipidemia, COVID19 infection Nov 2020 and past hx of Hepatitis C which has been treated successfully.  He was seen by Oph in Nov 2021 and was told he has diabetic retinopathy.  He also has a hx of intermittent microalbuminuria.  No sx of diabetic neuropathy.  For his diabetes, he is currently taking Metformin 500 mg 2 tabs po BID and Januvia 100 mg daily.  Pt's A1C was 7.4 % on 11/15/2021.  Mark has not been checking his blood sugar.  Pt states he is not checking his blood sugar at home because he does not like to stick himself with the lancets. He was told his insurance will not cover a Freestyle Ama sensor.  On ROS today, no new complaints.   He has chronic back, hip and right leg pain.    Pt denies frequent headaches, blurred vision,  n/v, SOB at rest, fever, chills or chest pain.  No abd pain, diarrhea, dysuria, hematuria or foot ulcers.  Some numbness and pain intermittently in this right leg. He has hx chronic back issues.  No stool/urine incontinence.  He denies any numbness, tingling or pain in his left foot/leg.  Pt has received the COVID vaccine ( J & J ) and COVID booster.    DIABETES CARE:  Retinopathy: yes; seen by Oph in Nov 2021 at [facility] and he was told he has diabetic retinopathy.  Nephropathy: urine  microalbuminuria + intermittently.  BP was 124/74 at 2021 clinic visit with PCP. Will recheck urine microalbuminuria and if + start low dose Lisinopril.  Neuropathy: no diabetic neuropathy.  Foot exam: no exam today.  Taking ASA: yes.  Lipids: LDL 93 in 2021 on Simvastatin.  Insulin: none. DM meds: Metformin and Januvia.  Testing: not testing.   Freestyle Ama sensor not covered by insurance.    ROS   Please see under HPI.     ALLERGIES:   No Known Allergies      Prescription Medications as of 2022       Rx Number Disp Refills Start End Last Dispensed Date Next Fill Date Owning Pharmacy    simvastatin (ZOCOR) 40 MG tablet  90 tablet 3 2021    [facility]/pharmacy #[address] [place], [place] [id number] [facility]    Sig: Take 1 tablet (40 mg) by mouth daily    Class: E-Prescribe    Route: Oral    aspirin 81 MG tablet            Sig: Take 1 tablet by mouth daily.    Class: Historical    Route: Oral    blood glucose (ONETOUCH ULTRA) test strip  250 strip 4 2021    [facility]/pharmacy #[address] [place], [place] [id number] [facility]    Sig: Use to test blood sugar twice daily.    Class: Historical    Continuous Blood Gluc Sensor (FREESTYLE AMA 2 SENSOR) MISC  2 each 5 2021    [facility] [place] [address] [address]    Si each every 14 days 1 each every 14 days. Change every 14 days.    Class: E-Prescribe    Route: Does not apply    metFORMIN (GLUCOPHAGE) 500 MG tablet  360 tablet 3 2021    [facility]/pharmacy #[address] [place] [address]    Sig: Take 2 tabs po BID.    Class: E-Prescribe    Omega-3 Fatty Acids (FISH OIL) 500 MG CAPS            Sig: Take 2 capsules by mouth daily.    Class: Historical    Route: Oral    sitagliptin (JANUVIA) 100 MG tablet  90 tablet 3 2021    [facility]/pharmacy [address] [place], [place] [id number] [facility]    Sig: Take 1 tablet (100 mg) by mouth daily    Class: E-Prescribe    Route: Oral        Family Hx   No family hx of diabetes.    Personal Hx    Smoke: None at this time.  ETOH : None at this time.    PMH   1. Type 2 Diabetes Mellitus dx in 2007.   2. Hepatitis C; pt underwent tx for Hep C and his Hep C has cleared.  3. Obesity.  4. Past hx of nicotine use.   5. Hx of undescended testis s/p surgery in 1971.   6. Anemia while undergoing hep C treatment.  7. Retinopathy.  8. Microalbuminuria.  9. Hyperlipidemia.      Physical Exam   No exam today.    Results   Creatinine   Date Value Ref Range Status   09/01/2020 0.90 0.66 - 1.25 mg/dL Final     GFR Estimate   Date Value Ref Range Status   09/01/2020 86 >60 mL/min/[1.73_m2] Final     Comment:     Non  GFR Calc  Starting 12/18/2018, serum creatinine based estimated GFR (eGFR) will be   calculated using the Chronic Kidney Disease Epidemiology Collaboration   (CKD-EPI) equation.       Hemoglobin A1C POCT   Date Value Ref Range Status   09/01/2020 7.3 (H) 0 - 5.6 % Final     Comment:     Normal <5.7% Prediabetes 5.7-6.4%  Diabetes 6.5% or higher - adopted from ADA   consensus guidelines.       Hemoglobin A1C   Date Value Ref Range Status   11/15/2021 7.4 (H) 0.0 - 5.6 % Final     Comment:     Normal <5.7%   Prediabetes 5.7-6.4%    Diabetes 6.5% or higher     Note: Adopted from ADA consensus guidelines.     Potassium   Date Value Ref Range Status   11/15/2021 4.6 3.5 - 5.0 mmol/L Final   04/09/2019 4.0 3.4 - 5.3 mmol/L Final     ALT   Date Value Ref Range Status   11/15/2021 12 0 - 45 U/L Final   09/01/2020 18 0 - 70 U/L Final     AST   Date Value Ref Range Status   11/15/2021 11 0 - 40 U/L Final   09/01/2020 7 0 - 45 U/L Final     TSH   Date Value Ref Range Status   11/15/2021 2.95 0.30 - 5.00 uIU/mL Final   09/01/2020 2.85 0.40 - 4.00 mU/L Final     T4 Free   Date Value Ref Range Status   09/07/2017 1.02 0.76 - 1.46 ng/dL Final         Cholesterol   Date Value Ref Range Status   11/15/2021 160 <=199 mg/dL Final   09/01/2020 146 <200 mg/dL Final   04/09/2019 152 <200 mg/dL Final     HDL  Cholesterol   Date Value Ref Range Status   09/01/2020 53 >39 mg/dL Final   04/09/2019 55 >39 mg/dL Final     Direct Measure HDL   Date Value Ref Range Status   11/15/2021 52 >=40 mg/dL Final     Comment:     HDL Cholesterol Reference Range:     0-2 years:   No reference ranges established for patients under 2 years old  at [facility] for lipid analytes.    2-8 years:  Greater than 45 mg/dL     18 years and older:   Female: Greater than or equal to 50 mg/dL   Male:   Greater than or equal to 40 mg/dL     LDL Cholesterol Calculated   Date Value Ref Range Status   11/15/2021 93 <=129 mg/dL Final   09/01/2020 79 <100 mg/dL Final     Comment:     Desirable:       <100 mg/dl   04/09/2019 77 <100 mg/dL Final     Comment:     Desirable:       <100 mg/dl     Triglycerides   Date Value Ref Range Status   11/15/2021 74 <=149 mg/dL Final   09/01/2020 71 <150 mg/dL Final   04/09/2019 96 <150 mg/dL Final     Cholesterol/HDL Ratio   Date Value Ref Range Status   08/20/2014 2.8 0.0 - 5.0 Final   11/19/2013 3.7 0.0 - 5.0 Final       ASSESSMENT/PLAN:     1. TYPE 2 DIABETES MELLITUS: Patient's A1C was 7.4 % in Nov 2021. His Januvia dose was increased 100 mg daily.  I have no blood sugar readings since Mark does not check his blood sugar.  Freestyle Ama sensor is not covered by his insurance. He may consider purchasing this sensor - will have pharmacy call pt with  cost for this sensor.  I discussed adding Jardiance 25 mg daily with patient today and discontinuing  Januvia.  Reviewed the recent studies on Jardiance- cardiovascular and renal benefits of this drug, how the drug works and possible side effects of the medication including dehydration, UTIs and groin yeast infection. Pt would first like to see what his A1C result is and if his A1C remains > 7 %, he will consider taking Jardiance and discontinuing Januvia.  For now, continue Januvia 100 mg daily and  Metformin 1000 mg BID.   Pt's creat was 0.9 with GFR 86  mL/min in Fall 2020.  He denies sx of diabetic neuropathy.  /74 at PCP visit in Nov 2021.  Pt received the COVID vaccines (J & J ) and the COVID booster.    2.  RETINOPATHY: Seen by Oph 11/18/2021 and was told he has diabetic retinopathy.  Reminded him to see Oph annually.    3.  MICROALBUMINURIA: Intermittent microalbuminuria.  BP good as above.  Will recheck urine microalbuminuria and if + will consider adding low dose ace or ARB.   Most recent creat/GFR as above.    4.  HYPERLIPIDEMIA: LDL 93 and HDL 52 on 11/15/2021.  Pt is taking Simvastatin and fish oil.    5. OVERWEIGHT: Encouraged pt to make healthy food choices and remain active.    6.  HX OF HEPATITIS C: Pt has completed his hep C treatment and states his hep C has clear.      7. FOLLOW UP: With me in 6 months.  A1C, creat/GFR and urine microalbuminuria ordered today.  I will call pt once lab results are available.    Time spent reviewing chart and labs today = 5 minutes.  Time for video visit today=  15 minutes.  Time for documentation today = 15 minutes.    TOTAL TIME FOR VISIT TODAY = 35 minutes.    Elizabeth Guzman PA-C                  Answers for HPI/ROS submitted by the patient on 5/16/2022  General Symptoms: No  Skin Symptoms: No  HENT Symptoms: No  EYE SYMPTOMS: No  HEART SYMPTOMS: No  LUNG SYMPTOMS: No  INTESTINAL SYMPTOMS: No  URINARY SYMPTOMS: No  REPRODUCTIVE SYMPTOMS: No  SKELETAL SYMPTOMS: No  BLOOD SYMPTOMS: No  NERVOUS SYSTEM SYMPTOMS: No  MENTAL HEALTH SYMPTOMS: No

## 2022-05-17 NOTE — TELEPHONE ENCOUNTER
Spoke to Mark and informed him of the cash price for Freestyle Ama Sensors. I ran test claims for both the Ama 2 and the 14 day formulation and received a price of $264.51/month for each. I also ran test claims on the Dexcom Transmitter and Sensors which each had costs of around $450. Pt stated that he will think about the information that was given to him and reach out to the clinic if he decides to move forward with out of pocket Ama.

## 2022-05-17 NOTE — LETTER
5/17/2022       RE: Mark Garces  [address] [facility] [address]     Dear Colleague,    Thank you for referring your patient, Mark Garces, to the [facility] [place] at [facility]. Please see a copy of my visit note below.    Outcome for 05/10/22 1:40 PM: [employer] message sent  DARVIN Hernandez  Outcome for 05/11/22 2:31 PM: Patient is not checking blood sugars  DARVIN Hernandez          Mark Garces  is being evaluated via a billable video visit.      How would you like to obtain your AVS? [employer]  For the video visit, send the invitation by: Text to cell phone: [phone]  Will anyone else be joining your video visit? No      Due to the COVID 19 pandemic this visit was converted to a video visit in order to help prevent spread of infection in this patient and the general population.    Time of start: 9:54 am  Time of end: 10:10 am  Total duration of video visit: 16 minutes.    HPI   Mark Garces is a 71 year old male with type 2 diabetes mellitus. Video visit for diabetes follow up.  Pt's hx is significant for type 2 DM dx in 2007, obesity, hyperlipidemia, COVID19 infection Nov 2020 and past hx of Hepatitis C which has been treated successfully.  He was seen by Oph in Nov 2021 and was told he has diabetic retinopathy.  He also has a hx of intermittent microalbuminuria.  No sx of diabetic neuropathy.  For his diabetes, he is currently taking Metformin 500 mg 2 tabs po BID and Januvia 100 mg daily.  Pt's A1C was 7.4 % on 11/15/2021.  Mark has not been checking his blood sugar.  Pt states he is not checking his blood sugar at home because he does not like to stick himself with the lancets. He was told his insurance will not cover a Freestyle Ama sensor.  On ROS today, no new complaints.   He has chronic back, hip and right leg pain.    Pt denies frequent headaches, blurred vision,  n/v, SOB at rest, fever, chills or  chest pain.  No abd pain, diarrhea, dysuria, hematuria or foot ulcers.  Some numbness and pain intermittently in this right leg. He has hx chronic back issues.  No stool/urine incontinence.  He denies any numbness, tingling or pain in his left foot/leg.  Pt has received the COVID vaccine ( J & J ) and COVID booster.    DIABETES CARE:  Retinopathy: yes; seen by Oph in 2021 at [facility] and he was told he has diabetic retinopathy.  Nephropathy: urine microalbuminuria + intermittently.  BP was 124/74 at 2021 clinic visit with PCP. Will recheck urine microalbuminuria and if + start low dose Lisinopril.  Neuropathy: no diabetic neuropathy.  Foot exam: no exam today.  Taking ASA: yes.  Lipids: LDL 93 in 2021 on Simvastatin.  Insulin: none. DM meds: Metformin and Januvia.  Testing: not testing.   Freestyle Ama sensor not covered by insurance.    ROS   Please see under HPI.     ALLERGIES:   No Known Allergies      Prescription Medications as of 2022       Rx Number Disp Refills Start End Last Dispensed Date Next Fill Date Owning Pharmacy    simvastatin (ZOCOR) 40 MG tablet  90 tablet 3 2021    [facility]/pharmacy #[id number] [facility] [address] [facility]    Sig: Take 1 tablet (40 mg) by mouth daily    Class: E-Prescribe    Route: Oral    aspirin 81 MG tablet            Sig: Take 1 tablet by mouth daily.    Class: Historical    Route: Oral    blood glucose (ONETOUCH ULTRA) test strip  250 strip 4 2021    [facility]/pharmacy #[id number] [place] [id number] [facility]    Sig: Use to test blood sugar twice daily.    Class: Historical    Continuous Blood Gluc Sensor (FREESTYLE AMA 2 SENSOR) MISC  2 each 5 2021    [facility] - [place], [place] - [facility] [address]    Si each every 14 days 1 each every 14 days. Change every 14 days.    Class: E-Prescribe    Route: Does not apply    metFORMIN (GLUCOPHAGE) 500 MG tablet  360 tablet 3 2021    [id number] [address] [facility]    Sig: Take 2 tabs po BID.    Class: E-Prescribe    Omega-3 Fatty Acids (FISH OIL) 500 MG CAPS            Sig: Take 2 capsules by mouth daily.    Class: Historical    Route: Oral    sitagliptin (JANUVIA) 100 MG tablet  90 tablet 3 11/19/2021    [facility]/pharmacy #[id number] - [place], [place] - [address] [facility]    Sig: Take 1 tablet (100 mg) by mouth daily    Class: E-Prescribe    Route: Oral        Family Hx   No family hx of diabetes.    Personal Hx   Smoke: None at this time.  ETOH : None at this time.    PMH   1. Type 2 Diabetes Mellitus dx in 2007.   2. Hepatitis C; pt underwent tx for Hep C and his Hep C has cleared.  3. Obesity.  4. Past hx of nicotine use.   5. Hx of undescended testis s/p surgery in 1971.   6. Anemia while undergoing hep C treatment.  7. Retinopathy.  8. Microalbuminuria.  9. Hyperlipidemia.      Physical Exam   No exam today.    Results   Creatinine   Date Value Ref Range Status   09/01/2020 0.90 0.66 - 1.25 mg/dL Final     GFR Estimate   Date Value Ref Range Status   09/01/2020 86 >60 mL/min/[1.73_m2] Final     Comment:     Non  GFR Calc  Starting 12/18/2018, serum creatinine based estimated GFR (eGFR) will be   calculated using the Chronic Kidney Disease Epidemiology Collaboration   (CKD-EPI) equation.       Hemoglobin A1C POCT   Date Value Ref Range Status   09/01/2020 7.3 (H) 0 - 5.6 % Final     Comment:     Normal <5.7% Prediabetes 5.7-6.4%  Diabetes 6.5% or higher - adopted from ADA   consensus guidelines.       Hemoglobin A1C   Date Value Ref Range Status   11/15/2021 7.4 (H) 0.0 - 5.6 % Final     Comment:     Normal <5.7%   Prediabetes 5.7-6.4%    Diabetes 6.5% or higher     Note: Adopted from ADA consensus guidelines.     Potassium   Date Value Ref Range Status   11/15/2021 4.6 3.5 - 5.0 mmol/L Final   04/09/2019 4.0 3.4 - 5.3 mmol/L Final     ALT   Date Value Ref Range Status   11/15/2021 12 0 - 45 U/L Final   09/01/2020 18 0 - 70 U/L Final      AST   Date Value Ref Range Status   11/15/2021 11 0 - 40 U/L Final   09/01/2020 7 0 - 45 U/L Final     TSH   Date Value Ref Range Status   11/15/2021 2.95 0.30 - 5.00 uIU/mL Final   09/01/2020 2.85 0.40 - 4.00 mU/L Final     T4 Free   Date Value Ref Range Status   09/07/2017 1.02 0.76 - 1.46 ng/dL Final         Cholesterol   Date Value Ref Range Status   11/15/2021 160 <=199 mg/dL Final   09/01/2020 146 <200 mg/dL Final   04/09/2019 152 <200 mg/dL Final     HDL Cholesterol   Date Value Ref Range Status   09/01/2020 53 >39 mg/dL Final   04/09/2019 55 >39 mg/dL Final     Direct Measure HDL   Date Value Ref Range Status   11/15/2021 52 >=40 mg/dL Final     Comment:     HDL Cholesterol Reference Range:     0-2 years:   No reference ranges established for patients under 2 years old  at [facility] for lipid analytes.    2-8 years:  Greater than 45 mg/dL     18 years and older:   Female: Greater than or equal to 50 mg/dL   Male:   Greater than or equal to 40 mg/dL     LDL Cholesterol Calculated   Date Value Ref Range Status   11/15/2021 93 <=129 mg/dL Final   09/01/2020 79 <100 mg/dL Final     Comment:     Desirable:       <100 mg/dl   04/09/2019 77 <100 mg/dL Final     Comment:     Desirable:       <100 mg/dl     Triglycerides   Date Value Ref Range Status   11/15/2021 74 <=149 mg/dL Final   09/01/2020 71 <150 mg/dL Final   04/09/2019 96 <150 mg/dL Final     Cholesterol/HDL Ratio   Date Value Ref Range Status   08/20/2014 2.8 0.0 - 5.0 Final   11/19/2013 3.7 0.0 - 5.0 Final       ASSESSMENT/PLAN:     1. TYPE 2 DIABETES MELLITUS: Patient's A1C was 7.4 % in Nov 2021. His Januvia dose was increased 100 mg daily.  I have no blood sugar readings since Mark does not check his blood sugar.  Freestyle Ama sensor is not covered by his insurance. He may consider purchasing this sensor - will have pharmacy call pt with  cost for this sensor.  I discussed adding Jardiance 25 mg daily with patient today and  discontinuing  Januvia.  Reviewed the recent studies on Jardiance- cardiovascular and renal benefits of this drug, how the drug works and possible side effects of the medication including dehydration, UTIs and groin yeast infection. Pt would first like to see what his A1C result is and if his A1C remains > 7 %, he will consider taking Jardiance and discontinuing Januvia.  For now, continue Januvia 100 mg daily and  Metformin 1000 mg BID.   Pt's creat was 0.9 with GFR 86 mL/min in Fall 2020.  He denies sx of diabetic neuropathy.  /74 at PCP visit in Nov 2021.  Pt received the COVID vaccines (J & J ) and the COVID booster.    2.  RETINOPATHY: Seen by Oph 11/18/2021 and was told he has diabetic retinopathy.  Reminded him to see Oph annually.    3.  MICROALBUMINURIA: Intermittent microalbuminuria.  BP good as above.  Will recheck urine microalbuminuria and if + will consider adding low dose ace or ARB.   Most recent creat/GFR as above.    4.  HYPERLIPIDEMIA: LDL 93 and HDL 52 on 11/15/2021.  Pt is taking Simvastatin and fish oil.    5. OVERWEIGHT: Encouraged pt to make healthy food choices and remain active.    6.  HX OF HEPATITIS C: Pt has completed his hep C treatment and states his hep C has clear.      7. FOLLOW UP: With me in 6 months.  A1C, creat/GFR and urine microalbuminuria ordered today.  I will call pt once lab results are available.    Time spent reviewing chart and labs today = 5 minutes.  Time for video visit today=  15 minutes.  Time for documentation today = 15 minutes.    TOTAL TIME FOR VISIT TODAY = 35 minutes.    Elizabeth Guzman PA-C                  Answers for HPI/ROS submitted by the patient on 5/16/2022  General Symptoms: No  Skin Symptoms: No  HENT Symptoms: No  EYE SYMPTOMS: No  HEART SYMPTOMS: No  LUNG SYMPTOMS: No  INTESTINAL SYMPTOMS: No  URINARY SYMPTOMS: No  REPRODUCTIVE SYMPTOMS: No  SKELETAL SYMPTOMS: No  BLOOD SYMPTOMS: No  NERVOUS SYSTEM SYMPTOMS: No  MENTAL HEALTH SYMPTOMS: No

## 2022-06-10 ENCOUNTER — OFFICE VISIT (OUTPATIENT)
Dept: FAMILY MEDICINE | Facility: CLINIC | Age: 72
End: 2022-06-10
Payer: MEDICARE

## 2022-06-10 VITALS
SYSTOLIC BLOOD PRESSURE: 138 MMHG | BODY MASS INDEX: 36.46 KG/M2 | WEIGHT: 261.4 LBS | HEART RATE: 80 BPM | TEMPERATURE: 97.7 F | OXYGEN SATURATION: 98 % | DIASTOLIC BLOOD PRESSURE: 80 MMHG

## 2022-06-10 DIAGNOSIS — E11.42 DIABETIC POLYNEUROPATHY ASSOCIATED WITH TYPE 2 DIABETES MELLITUS (H): ICD-10-CM

## 2022-06-10 DIAGNOSIS — M25.551 HIP PAIN, RIGHT: ICD-10-CM

## 2022-06-10 DIAGNOSIS — E11.65 TYPE 2 DIABETES MELLITUS WITH HYPERGLYCEMIA, WITHOUT LONG-TERM CURRENT USE OF INSULIN (H): ICD-10-CM

## 2022-06-10 DIAGNOSIS — Z23 HIGH PRIORITY FOR 2019-NCOV VACCINE: Primary | ICD-10-CM

## 2022-06-10 LAB
ANION GAP SERPL CALCULATED.3IONS-SCNC: 14 MMOL/L (ref 5–18)
BUN SERPL-MCNC: 19 MG/DL (ref 8–28)
CALCIUM SERPL-MCNC: 9.7 MG/DL (ref 8.5–10.5)
CHLORIDE BLD-SCNC: 105 MMOL/L (ref 98–107)
CO2 SERPL-SCNC: 21 MMOL/L (ref 22–31)
CREAT SERPL-MCNC: 0.8 MG/DL (ref 0.7–1.3)
CREAT SERPL-MCNC: 0.8 MG/DL (ref 0.7–1.3)
CREAT UR-MCNC: 35 MG/DL
GFR SERPL CREATININE-BSD FRML MDRD: >90 ML/MIN/1.73M2
GFR SERPL CREATININE-BSD FRML MDRD: >90 ML/MIN/1.73M2
GLUCOSE BLD-MCNC: 159 MG/DL (ref 70–125)
HBA1C MFR BLD: 7.6 % (ref 0–5.6)
MICROALBUMIN UR-MCNC: 1.14 MG/DL (ref 0–1.99)
MICROALBUMIN/CREAT UR: 32.6 MG/G CR
POTASSIUM BLD-SCNC: 4.5 MMOL/L (ref 3.5–5)
SODIUM SERPL-SCNC: 140 MMOL/L (ref 136–145)

## 2022-06-10 PROCEDURE — 36415 COLL VENOUS BLD VENIPUNCTURE: CPT | Performed by: FAMILY MEDICINE

## 2022-06-10 PROCEDURE — 91306 COVID-19,PF,MODERNA (18+ YRS BOOSTER .25ML): CPT | Performed by: FAMILY MEDICINE

## 2022-06-10 PROCEDURE — 80048 BASIC METABOLIC PNL TOTAL CA: CPT | Performed by: FAMILY MEDICINE

## 2022-06-10 PROCEDURE — 83036 HEMOGLOBIN GLYCOSYLATED A1C: CPT | Performed by: FAMILY MEDICINE

## 2022-06-10 PROCEDURE — 82043 UR ALBUMIN QUANTITATIVE: CPT | Performed by: FAMILY MEDICINE

## 2022-06-10 PROCEDURE — 0064A COVID-19,PF,MODERNA (18+ YRS BOOSTER .25ML): CPT | Performed by: FAMILY MEDICINE

## 2022-06-10 PROCEDURE — 99213 OFFICE O/P EST LOW 20 MIN: CPT | Mod: 25 | Performed by: FAMILY MEDICINE

## 2022-06-10 NOTE — PROGRESS NOTES
"  Assessment & Plan     Type 2 diabetes mellitus with hyperglycemia, without long-term current use of insulin (H)  Labs done today and he will follow-up with his specialist who manages his diabetes  - BASIC METABOLIC PANEL  - Hemoglobin A1c  - Creatinine  - Albumin Random Urine Quantitative with Creat Ratio    High priority for 2019-nCoV vaccine    - COVID-19,PF,MODERNA (18+ Yrs BOOSTER .25mL)    Hip pain, right  Completed handicap parking application permit.  Due to his right hip pain, he experiences pain with walking long distances as well as balance difficulties.  He is at risk of falls.  Occasionally uses a cane for ambulation    Diabetic polyneuropathy associated with type 2 diabetes mellitus (H)  Completed handicap parking application permit.  Due to his neuropathy he has pain and balance difficulties which make it hard for him to walk long distances.  He is also at risk of falls.  Occasionally uses a cane for ambulation    We discussed that he is due for a number of vaccines.  He declines these today.  Encouraged him to schedule annual wellness visit with PCP             BMI:   Estimated body mass index is 36.46 kg/m  as calculated from the following:    Height as of 11/30/21: 1.803 m (5' 11\").    Weight as of this encounter: 118.6 kg (261 lb 6.4 oz).           No follow-ups on file.    Megha Breaux MD  [facility]    Frances Flores is a 71 year old who presents for the following health issues     HPI   He comes in today for labs as well as COVID booster.  He has underlying type 2 diabetes and follows with a provider at the [facility].  He has lab orders from this provider that he needs to have drawn today.  He is traveling next week and would like to have his COVID booster before he travels.  He also would like to discuss getting a handicap parking sticker.  He has neuropathy in his right leg secondary to his diabetes.  Also has right hip pain and right knee pain.  " He occasionally uses a cane for ambulation.  The neuropathy in his leg and right hip pain affect his balance and put him at risk of falls.  He also has difficulty walking for long distances due to the neuropathy and pain in his right hip.  Feels it would be helpful to have a handicap parking sticker to park closer to the store when he goes grocery shopping.  Once he is able to get inside and use a cart for stability he does okay.  He has no other concerns or questions.  Medications are reviewed and updated        Review of Systems         Objective    /80 (BP Location: Right arm, Cuff Size: Adult Large)   Pulse 80   Temp 97.7  F (36.5  C) (Oral)   Wt 118.6 kg (261 lb 6.4 oz)   SpO2 98%   BMI 36.46 kg/m    Body mass index is 36.46 kg/m .  Physical Exam   GENERAL: healthy, alert and no distress  PSYCH: mentation appears normal, affect normal/bright

## 2022-06-13 DIAGNOSIS — E11.65 TYPE 2 DIABETES MELLITUS WITH HYPERGLYCEMIA, WITHOUT LONG-TERM CURRENT USE OF INSULIN (H): Primary | ICD-10-CM

## 2022-06-22 DIAGNOSIS — E11.9 DM (DIABETES MELLITUS), TYPE 2 (H): Primary | ICD-10-CM

## 2022-12-12 DIAGNOSIS — E11.8 TYPE 2 DIABETES MELLITUS WITH COMPLICATION, WITH LONG-TERM CURRENT USE OF INSULIN (H): ICD-10-CM

## 2022-12-12 DIAGNOSIS — E11.65 TYPE 2 DIABETES MELLITUS WITH HYPERGLYCEMIA, WITHOUT LONG-TERM CURRENT USE OF INSULIN (H): ICD-10-CM

## 2022-12-12 DIAGNOSIS — Z79.4 TYPE 2 DIABETES MELLITUS WITH COMPLICATION, WITH LONG-TERM CURRENT USE OF INSULIN (H): ICD-10-CM

## 2022-12-14 RX ORDER — SIMVASTATIN 40 MG
40 TABLET ORAL DAILY
Qty: 90 TABLET | Refills: 1 | Status: SHIPPED | OUTPATIENT
Start: 2022-12-14 | End: 2022-12-15

## 2022-12-14 NOTE — TELEPHONE ENCOUNTER
Spoke to pt over the phone 12/14/22 and scheduled appointment. Pt wondering if you would like him to complete labs beforehand? Currently there are no active orders in the system for labs. Please advise when able.    Thank you,  Ron

## 2022-12-14 NOTE — TELEPHONE ENCOUNTER
METFORMIN  MG TABLET      Last Written Prescription Date:  11-19-21  Last Fill Quantity: 360,   # refills: 3  Last Office Visit : 5-17-22 ( RTC 6 m)  Future Office visit:  none    Routing refill request to provider for review/approval because:  Overdue lab: A1c    Scheduling has been notified to contact the pt for appointment.    SIMVASTATIN 40 MG TABLET      Last Written Prescription Date:  12-8-21  Last Fill Quantity: 90,   # refills: 3    Routing refill request to provider for review/approval because:  Overdue lab: LDL

## 2022-12-15 DIAGNOSIS — Z79.4 TYPE 2 DIABETES MELLITUS WITH COMPLICATION, WITH LONG-TERM CURRENT USE OF INSULIN (H): ICD-10-CM

## 2022-12-15 DIAGNOSIS — E11.8 TYPE 2 DIABETES MELLITUS WITH COMPLICATION, WITH LONG-TERM CURRENT USE OF INSULIN (H): ICD-10-CM

## 2022-12-15 DIAGNOSIS — E11.65 TYPE 2 DIABETES MELLITUS WITH HYPERGLYCEMIA, WITHOUT LONG-TERM CURRENT USE OF INSULIN (H): ICD-10-CM

## 2022-12-15 RX ORDER — SIMVASTATIN 40 MG
40 TABLET ORAL DAILY
Qty: 90 TABLET | Refills: 1 | Status: SHIPPED | OUTPATIENT
Start: 2022-12-15 | End: 2023-01-19

## 2023-01-07 ENCOUNTER — HEALTH MAINTENANCE LETTER (OUTPATIENT)
Age: 73
End: 2023-01-07

## 2023-01-10 DIAGNOSIS — E11.65 TYPE 2 DIABETES MELLITUS WITH HYPERGLYCEMIA, WITHOUT LONG-TERM CURRENT USE OF INSULIN (H): Primary | ICD-10-CM

## 2023-01-12 ENCOUNTER — LAB (OUTPATIENT)
Dept: LAB | Facility: CLINIC | Age: 73
End: 2023-01-12
Payer: MEDICARE

## 2023-01-12 DIAGNOSIS — E11.65 TYPE 2 DIABETES MELLITUS WITH HYPERGLYCEMIA, WITHOUT LONG-TERM CURRENT USE OF INSULIN (H): ICD-10-CM

## 2023-01-12 DIAGNOSIS — E11.9 DM (DIABETES MELLITUS), TYPE 2 (H): ICD-10-CM

## 2023-01-12 DIAGNOSIS — Z13.220 SCREENING FOR HYPERLIPIDEMIA: ICD-10-CM

## 2023-01-12 LAB
CHOLEST SERPL-MCNC: 164 MG/DL
HBA1C MFR BLD: 8.3 % (ref 0–5.6)
HDLC SERPL-MCNC: 51 MG/DL
LDLC SERPL CALC-MCNC: 89 MG/DL
NONHDLC SERPL-MCNC: 113 MG/DL
TRIGL SERPL-MCNC: 118 MG/DL

## 2023-01-12 PROCEDURE — 36415 COLL VENOUS BLD VENIPUNCTURE: CPT

## 2023-01-12 PROCEDURE — 83036 HEMOGLOBIN GLYCOSYLATED A1C: CPT

## 2023-01-12 PROCEDURE — 80061 LIPID PANEL: CPT

## 2023-01-12 NOTE — PROGRESS NOTES
Mark Garces  is being evaluated via a billable video visit.      How would you like to obtain your AVS? [employer]  For the video visit, send the invitation by: Send to e-mail at: [email or web address]  Will anyone else be joining your video visit? No      Outcome for 01/12/23 9:21 AM: [email or web address] message sent  DARVIN Gonzalez  Outcome for 01/17/23 3:47 PM: Patient is not checking blood sugars  Taylor Myhre, VF

## 2023-01-17 ENCOUNTER — TELEPHONE (OUTPATIENT)
Dept: ENDOCRINOLOGY | Facility: CLINIC | Age: 73
End: 2023-01-17
Payer: MEDICARE

## 2023-01-19 ENCOUNTER — VIRTUAL VISIT (OUTPATIENT)
Dept: ENDOCRINOLOGY | Facility: CLINIC | Age: 73
End: 2023-01-19
Payer: MEDICARE

## 2023-01-19 ENCOUNTER — TELEPHONE (OUTPATIENT)
Dept: ENDOCRINOLOGY | Facility: CLINIC | Age: 73
End: 2023-01-19

## 2023-01-19 DIAGNOSIS — Z79.4 TYPE 2 DIABETES MELLITUS WITH COMPLICATION, WITH LONG-TERM CURRENT USE OF INSULIN (H): ICD-10-CM

## 2023-01-19 DIAGNOSIS — E11.8 TYPE 2 DIABETES MELLITUS WITH COMPLICATION, WITH LONG-TERM CURRENT USE OF INSULIN (H): ICD-10-CM

## 2023-01-19 DIAGNOSIS — E11.65 TYPE 2 DIABETES MELLITUS WITH HYPERGLYCEMIA, WITHOUT LONG-TERM CURRENT USE OF INSULIN (H): Primary | ICD-10-CM

## 2023-01-19 PROCEDURE — 99214 OFFICE O/P EST MOD 30 MIN: CPT | Mod: 95 | Performed by: PHYSICIAN ASSISTANT

## 2023-01-19 RX ORDER — SIMVASTATIN 40 MG
40 TABLET ORAL DAILY
Qty: 90 TABLET | Refills: 3 | COMMUNITY
Start: 2023-01-19 | End: 2023-06-10

## 2023-01-19 RX ORDER — LISINOPRIL 10 MG/1
10 TABLET ORAL DAILY
Qty: 90 TABLET | Refills: 3 | Status: SHIPPED | OUTPATIENT
Start: 2023-01-19 | End: 2023-10-18

## 2023-01-19 NOTE — NURSING NOTE
"Pt states he has been taking a magnesium-calcium supplement. Pt also advises he has been taking \"move-free\", for joint discomfort. Lou Trevizo on 1/19/2023 at 10:19 AM    "

## 2023-01-19 NOTE — PROGRESS NOTES
Due to the COVID 19 pandemic this visit was converted to a video visit in order to help prevent spread of infection in this patient and the general population.    Time of start: 10:30 am   Time of end: 10:46 am  Total duration of video visit: 16 minutes.  Providers location: offsite.  Patients location: home-[place].    HPI   Mark Garces is a 72 year old male with type 2 diabetes mellitus. Video visit for diabetes follow up.  Pt's hx is significant for type 2 DM dx in 2007, obesity, hyperlipidemia, COVID19 infection Nov 2020 and past hx of Hepatitis C which has been treated successfully.  He was seen by Oph in Nov 2021 and was told he has diabetic retinopathy.  He also has a hx of microalbuminuria.  No sx of diabetic neuropathy.  For his diabetes, he is currently taking Metformin 500 mg 2 tabs po BID and Jardiance 25 mg daily.  Most recent A1C was higher at 8.3 % on 1/12/2023.  Previous A1C was 7.4 % on 11/15/2021.  Mark has not been checking his blood sugar.  Pt states he is not checking his blood sugar at home because he does not like to stick himself with the lancets. He was told his insurance will not cover a Freestyle Ama sensor or CGM.  On ROS today, he will be seeing his primary care provider soon for annual exam and also discuss evaluation for sleep apnea.  He has chronic back, hip and right leg pain.    Pt denies frequent headaches, blurred vision,  n/v, SOB at rest, fever, chills or chest pain.  No abd pain, diarrhea, dysuria, hematuria or foot ulcers.  Some numbness and pain intermittently in this right leg. He has hx chronic back issues.  No stool/urine incontinence.  He denies any numbness, tingling or pain in his left foot/leg.    DIABETES CARE:  Retinopathy: yes; seen by Oph in Nov 2021 at [facility] and he was told he has diabetic retinopathy.  Nephropathy: urine microalbuminuria + in 6/2022.  Lisinopril ordered.  Neuropathy: no diabetic neuropathy.  Foot exam: no exam today.  Taking  ASA: yes.  Lipids: LDL 89 in Jan 20223. Pt taking Simvastatin.  Insulin: none.   DM meds: Metformin and Jardiance.  Testing: not testing.   Freestyle Ama sensor/CGM not covered by insurance.    ROS   Please see under HPI.     ALLERGIES:   No Known Allergies      Prescription Medications as of 1/19/2023       Rx Number Disp Refills Start End Last Dispensed Date Next Fill Date Owning Pharmacy    aspirin 81 MG tablet            Sig: Take 1 tablet by mouth daily.    Class: Historical    Route: Oral    blood glucose (NO BRAND SPECIFIED) test strip  250 strip 4 6/22/2022    [facility]/pharmacy #[address] [place] [address]    Sig: Use to test blood sugar twice daily.  ONE TOUCH ULTRA STRIPS    Class: E-Prescribe    empagliflozin (JARDIANCE) 25 MG TABS tablet  90 tablet 3 6/13/2022    [facility]/pharmacy #[address] [place] [address]    Sig: Take 1 tablet (25 mg) by mouth daily    Class: E-Prescribe    Route: Oral    metFORMIN (GLUCOPHAGE) 500 MG tablet  360 tablet 1 12/15/2022    [facility]/pharmacy #[address] [place] [address]    Sig: TAKE 2 TABLETS BY MOUTH TWICE A DAY.  For additional refills, please schedule a follow-up appointment, lab due    Class: E-Prescribe    Omega-3 Fatty Acids (FISH OIL) 500 MG CAPS            Sig: Take 2 capsules by mouth daily.    Class: Historical    Route: Oral    simvastatin (ZOCOR) 40 MG tablet  90 tablet 1 12/15/2022    [facility]/pharmacy #[address] [place] [address]    Sig: Take 1 tablet (40 mg) by mouth daily For additional refills, please schedule a follow-up appointment, lab due    Class: E-Prescribe    Route: Oral        Family Hx   No family hx of diabetes.    Personal Hx   Smoke: None at this time.  ETOH : None at this time.    PMH   1. Type 2 Diabetes Mellitus dx in 2007.   2. Hepatitis C; pt underwent tx for Hep C and his Hep C has cleared.  3. Obesity.  4. Past hx of nicotine use.   5. Hx of undescended testis s/p surgery in 1971.   6. Anemia while  undergoing hep C treatment.  7. Retinopathy.  8. Microalbuminuria.  9. Hyperlipidemia.      Physical Exam   No exam today.    Results   Creatinine   Date Value Ref Range Status   06/10/2022 0.80 0.70 - 1.30 mg/dL Final   06/10/2022 0.80 0.70 - 1.30 mg/dL Final   09/01/2020 0.90 0.66 - 1.25 mg/dL Final     GFR Estimate   Date Value Ref Range Status   06/10/2022 >90 >60 mL/min/1.73m2 Final     Comment:     Effective December 21, 2021 eGFRcr in adults is calculated using the 2021 CKD-EPI creatinine equation which includes age and gender (Wilber et al., [facility], DOI: 10.1056/BXDGff1140131)   06/10/2022 >90 >60 mL/min/1.73m2 Final     Comment:     Effective December 21, 2021 eGFRcr in adults is calculated using the 2021 CKD-EPI creatinine equation which includes age and gender (Wilber et al., [facility], DOI: 10.1056/EDSWrh4795141)   09/01/2020 86 >60 mL/min/[1.73_m2] Final     Comment:     Non  GFR Calc  Starting 12/18/2018, serum creatinine based estimated GFR (eGFR) will be   calculated using the Chronic Kidney Disease Epidemiology Collaboration   (CKD-EPI) equation.       Hemoglobin A1C   Date Value Ref Range Status   01/12/2023 8.3 (H) 0.0 - 5.6 % Final     Comment:     Normal <5.7%   Prediabetes 5.7-6.4%    Diabetes 6.5% or higher     Note: Adopted from ADA consensus guidelines.   09/01/2020 7.3 (H) 0 - 5.6 % Final     Comment:     Normal <5.7% Prediabetes 5.7-6.4%  Diabetes 6.5% or higher - adopted from ADA   consensus guidelines.       Potassium   Date Value Ref Range Status   06/10/2022 4.5 3.5 - 5.0 mmol/L Final   04/09/2019 4.0 3.4 - 5.3 mmol/L Final     ALT   Date Value Ref Range Status   11/15/2021 12 0 - 45 U/L Final   09/01/2020 18 0 - 70 U/L Final     AST   Date Value Ref Range Status   11/15/2021 11 0 - 40 U/L Final   09/01/2020 7 0 - 45 U/L Final     TSH   Date Value Ref Range Status   11/15/2021 2.95 0.30 - 5.00 uIU/mL Final   09/01/2020 2.85 0.40 - 4.00 mU/L Final     T4 Free   Date Value Ref  Range Status   09/07/2017 1.02 0.76 - 1.46 ng/dL Final         Cholesterol   Date Value Ref Range Status   01/12/2023 164 <200 mg/dL Final   11/15/2021 160 <=199 mg/dL Final   09/01/2020 146 <200 mg/dL Final   04/09/2019 152 <200 mg/dL Final     HDL Cholesterol   Date Value Ref Range Status   09/01/2020 53 >39 mg/dL Final   04/09/2019 55 >39 mg/dL Final     Direct Measure HDL   Date Value Ref Range Status   01/12/2023 51 >=40 mg/dL Final   11/15/2021 52 >=40 mg/dL Final     Comment:     HDL Cholesterol Reference Range:     0-2 years:   No reference ranges established for patients under 2 years old  at [employer] for lipid analytes.    2-8 years:  Greater than 45 mg/dL     18 years and older:   Female: Greater than or equal to 50 mg/dL   Male:   Greater than or equal to 40 mg/dL     LDL Cholesterol Calculated   Date Value Ref Range Status   01/12/2023 89 <=100 mg/dL Final   11/15/2021 93 <=129 mg/dL Final   09/01/2020 79 <100 mg/dL Final     Comment:     Desirable:       <100 mg/dl   04/09/2019 77 <100 mg/dL Final     Comment:     Desirable:       <100 mg/dl     Triglycerides   Date Value Ref Range Status   01/12/2023 118 <150 mg/dL Final   11/15/2021 74 <=149 mg/dL Final   09/01/2020 71 <150 mg/dL Final   04/09/2019 96 <150 mg/dL Final     Cholesterol/HDL Ratio   Date Value Ref Range Status   08/20/2014 2.8 0.0 - 5.0 Final   11/19/2013 3.7 0.0 - 5.0 Final       ASSESSMENT/PLAN:     1. TYPE 2 DIABETES MELLITUS: Patient's A1C is higher at 8.3 %.   Add Januvia 100 mg daily. Continue Jardiance 25 mg each am and Metformin 1000 mg BID.  Mark would like to avoid injectable drugs.   Consider adding Rybelsus if glycemic control does not improve with addition of Januiva.  He will also work on making healthy food choices and increase his activity.  I have no blood sugar readings since aMrk does not check his blood sugar.  Freestyle Ama sensor/CGM is not covered by his insurance.  Again, I reviewed the recent  studies on Jardiance- cardiovascular and renal benefits of this drug, how the drug works and possible side effects of the medication including dehydration, UTIs and groin yeast infection.  Pt's creat was 0.8 with GFR > 90 mL/min in June 2022.  He denies sx of diabetic neuropathy.    2.  RETINOPATHY: Seen by Oph 11/18/2021 and was told he has diabetic retinopathy.  Reminded him to see Oph annually.    3.  MICROALBUMINURIA: Added Lisinopril 10 mg daily.    4.  HYPERLIPIDEMIA: LDL 89 and HDL 51 in Jan 2022.  Pt is taking Simvastatin and fish oil.    5. OVERWEIGHT: Encouraged pt to make healthy food choices and remain active.    6.  HX OF HEPATITIS C: Pt has completed his hep C treatment and states his hep C has clear.      7. FOLLOW UP: With me in 4 months.  A1C to be rechecked in 3 months- ordered today.    Time spent reviewing chart and labs today = 5 minutes.  Time for video visit today=  16 minutes.  Time for documentation today = 15 minutes.    TOTAL TIME FOR VISIT TODAY = 36 minutes.    Elizabeth Guzman PA-C

## 2023-01-19 NOTE — LETTER
1/19/2023       RE: Mark Garces  [address] [facility] [address]     Dear Colleague,    Thank you for referring your patient, Mark Garces, to the [facility] [place] [facility]. Please see a copy of my visit note below.    Mark Garces  is being evaluated via a billable video visit.      How would you like to obtain your AVS? [employer]  For the video visit, send the invitation by: Send to e-mail at: [email or web address]  Will anyone else be joining your video visit? No      Outcome for 01/12/23 9:21 AM: [employer] message sent  DARVIN Gonzalez  Outcome for 01/17/23 3:47 PM: Patient is not checking blood sugars  Taylor Myhre, VF        Due to the COVID 19 pandemic this visit was converted to a video visit in order to help prevent spread of infection in this patient and the general population.    Time of start: 10:30 am   Time of end: 10:46 am  Total duration of video visit: 16 minutes.  Providers location: offsite.  Patients location: home-[place].    HPI   Mark Garces is a 72 year old male with type 2 diabetes mellitus. Video visit for diabetes follow up.  Pt's hx is significant for type 2 DM dx in 2007, obesity, hyperlipidemia, COVID19 infection Nov 2020 and past hx of Hepatitis C which has been treated successfully.  He was seen by Oph in Nov 2021 and was told he has diabetic retinopathy.  He also has a hx of microalbuminuria.  No sx of diabetic neuropathy.  For his diabetes, he is currently taking Metformin 500 mg 2 tabs po BID and Jardiance 25 mg daily.  Most recent A1C was higher at 8.3 % on 1/12/2023.  Previous A1C was 7.4 % on 11/15/2021.  Mark has not been checking his blood sugar.  Pt states he is not checking his blood sugar at home because he does not like to stick himself with the lancets. He was told his insurance will not cover a Freestyle Ama sensor or CGM.  On ROS today, he will be seeing his primary care  provider soon for annual exam and also discuss evaluation for sleep apnea.  He has chronic back, hip and right leg pain.    Pt denies frequent headaches, blurred vision,  n/v, SOB at rest, fever, chills or chest pain.  No abd pain, diarrhea, dysuria, hematuria or foot ulcers.  Some numbness and pain intermittently in this right leg. He has hx chronic back issues.  No stool/urine incontinence.  He denies any numbness, tingling or pain in his left foot/leg.    DIABETES CARE:  Retinopathy: yes; seen by Oph in Nov 2021 at [facility] in [place] and he was told he has diabetic retinopathy.  Nephropathy: urine microalbuminuria + in 6/2022.  Lisinopril ordered.  Neuropathy: no diabetic neuropathy.  Foot exam: no exam today.  Taking ASA: yes.  Lipids: LDL 89 in Jan 20223. Pt taking Simvastatin.  Insulin: none.   DM meds: Metformin and Jardiance.  Testing: not testing.   Freestyle Ama sensor/CGM not covered by insurance.    ROS   Please see under HPI.     ALLERGIES:   No Known Allergies      Prescription Medications as of 1/19/2023       Rx Number Disp Refills Start End Last Dispensed Date Next Fill Date Owning Pharmacy    aspirin 81 MG tablet            Sig: Take 1 tablet by mouth daily.    Class: Historical    Route: Oral    blood glucose (NO BRAND SPECIFIED) test strip  250 strip 4 6/22/2022    [facility]/pharmacy #[address] [place] [address]    Sig: Use to test blood sugar twice daily.  ONE TOUCH ULTRA STRIPS    Class: E-Prescribe    empagliflozin (JARDIANCE) 25 MG TABS tablet  90 tablet 3 6/13/2022    [facility]/pharmacy #[address] [place] [address]    Sig: Take 1 tablet (25 mg) by mouth daily    Class: E-Prescribe    Route: Oral    metFORMIN (GLUCOPHAGE) 500 MG tablet  360 tablet 1 12/15/2022    [facility]/pharmacy #[address] [place] [address]    Sig: TAKE 2 TABLETS BY MOUTH TWICE A DAY.  For additional refills, please schedule a follow-up appointment, lab due    Class: E-Prescribe    Omega-3 Fatty  Acids (FISH OIL) 500 MG CAPS            Sig: Take 2 capsules by mouth daily.    Class: Historical    Route: Oral    simvastatin (ZOCOR) 40 MG tablet  90 tablet 1 12/15/2022    [facility]/pharmacy #[id number] [place], [place] - [address] [facility]    Sig: Take 1 tablet (40 mg) by mouth daily For additional refills, please schedule a follow-up appointment, lab due    Class: E-Prescribe    Route: Oral        Family Hx   No family hx of diabetes.    Personal Hx   Smoke: None at this time.  ETOH : None at this time.    PMH   1. Type 2 Diabetes Mellitus dx in 2007.   2. Hepatitis C; pt underwent tx for Hep C and his Hep C has cleared.  3. Obesity.  4. Past hx of nicotine use.   5. Hx of undescended testis s/p surgery in 1971.   6. Anemia while undergoing hep C treatment.  7. Retinopathy.  8. Microalbuminuria.  9. Hyperlipidemia.      Physical Exam   No exam today.    Results   Creatinine   Date Value Ref Range Status   06/10/2022 0.80 0.70 - 1.30 mg/dL Final   06/10/2022 0.80 0.70 - 1.30 mg/dL Final   09/01/2020 0.90 0.66 - 1.25 mg/dL Final     GFR Estimate   Date Value Ref Range Status   06/10/2022 >90 >60 mL/min/1.73m2 Final     Comment:     Effective December 21, 2021 eGFRcr in adults is calculated using the 2021 CKD-EPI creatinine equation which includes age and gender ( et al., NEJM, DOI: 10.1056/GZVIek8486994)   06/10/2022 >90 >60 mL/min/1.73m2 Final     Comment:     Effective December 21, 2021 eGFRcr in adults is calculated using the 2021 CKD-EPI creatinine equation which includes age and gender ( et al., NEJM, DOI: 10.1056/MEIRjw2446920)   09/01/2020 86 >60 mL/min/[1.73_m2] Final     Comment:     Non  GFR Calc  Starting 12/18/2018, serum creatinine based estimated GFR (eGFR) will be   calculated using the Chronic Kidney Disease Epidemiology Collaboration   (CKD-EPI) equation.       Hemoglobin A1C   Date Value Ref Range Status   01/12/2023 8.3 (H) 0.0 - 5.6 % Final     Comment:     Normal <5.7%    Prediabetes 5.7-6.4%    Diabetes 6.5% or higher     Note: Adopted from ADA consensus guidelines.   09/01/2020 7.3 (H) 0 - 5.6 % Final     Comment:     Normal <5.7% Prediabetes 5.7-6.4%  Diabetes 6.5% or higher - adopted from ADA   consensus guidelines.       Potassium   Date Value Ref Range Status   06/10/2022 4.5 3.5 - 5.0 mmol/L Final   04/09/2019 4.0 3.4 - 5.3 mmol/L Final     ALT   Date Value Ref Range Status   11/15/2021 12 0 - 45 U/L Final   09/01/2020 18 0 - 70 U/L Final     AST   Date Value Ref Range Status   11/15/2021 11 0 - 40 U/L Final   09/01/2020 7 0 - 45 U/L Final     TSH   Date Value Ref Range Status   11/15/2021 2.95 0.30 - 5.00 uIU/mL Final   09/01/2020 2.85 0.40 - 4.00 mU/L Final     T4 Free   Date Value Ref Range Status   09/07/2017 1.02 0.76 - 1.46 ng/dL Final         Cholesterol   Date Value Ref Range Status   01/12/2023 164 <200 mg/dL Final   11/15/2021 160 <=199 mg/dL Final   09/01/2020 146 <200 mg/dL Final   04/09/2019 152 <200 mg/dL Final     HDL Cholesterol   Date Value Ref Range Status   09/01/2020 53 >39 mg/dL Final   04/09/2019 55 >39 mg/dL Final     Direct Measure HDL   Date Value Ref Range Status   01/12/2023 51 >=40 mg/dL Final   11/15/2021 52 >=40 mg/dL Final     Comment:     HDL Cholesterol Reference Range:     0-2 years:   No reference ranges established for patients under 2 years old  at [employer] for lipid analytes.    2-8 years:  Greater than 45 mg/dL     18 years and older:   Female: Greater than or equal to 50 mg/dL   Male:   Greater than or equal to 40 mg/dL     LDL Cholesterol Calculated   Date Value Ref Range Status   01/12/2023 89 <=100 mg/dL Final   11/15/2021 93 <=129 mg/dL Final   09/01/2020 79 <100 mg/dL Final     Comment:     Desirable:       <100 mg/dl   04/09/2019 77 <100 mg/dL Final     Comment:     Desirable:       <100 mg/dl     Triglycerides   Date Value Ref Range Status   01/12/2023 118 <150 mg/dL Final   11/15/2021 74 <=149 mg/dL Final    09/01/2020 71 <150 mg/dL Final   04/09/2019 96 <150 mg/dL Final     Cholesterol/HDL Ratio   Date Value Ref Range Status   08/20/2014 2.8 0.0 - 5.0 Final   11/19/2013 3.7 0.0 - 5.0 Final       ASSESSMENT/PLAN:     1. TYPE 2 DIABETES MELLITUS: Patient's A1C is higher at 8.3 %.   Add Januvia 100 mg daily. Continue Jardiance 25 mg each am and Metformin 1000 mg BID.  Mark would like to avoid injectable drugs.   Consider adding Rybelsus if glycemic control does not improve with addition of Januiva.  He will also work on making healthy food choices and increase his activity.  I have no blood sugar readings since Mark does not check his blood sugar.  Freestyle Ama sensor/CGM is not covered by his insurance.  Again, I reviewed the recent studies on Jardiance- cardiovascular and renal benefits of this drug, how the drug works and possible side effects of the medication including dehydration, UTIs and groin yeast infection.  Pt's creat was 0.8 with GFR > 90 mL/min in June 2022.  He denies sx of diabetic neuropathy.    2.  RETINOPATHY: Seen by Oph 11/18/2021 and was told he has diabetic retinopathy.  Reminded him to see Oph annually.    3.  MICROALBUMINURIA: Added Lisinopril 10 mg daily.    4.  HYPERLIPIDEMIA: LDL 89 and HDL 51 in Jan 2022.  Pt is taking Simvastatin and fish oil.    5. OVERWEIGHT: Encouraged pt to make healthy food choices and remain active.    6.  HX OF HEPATITIS C: Pt has completed his hep C treatment and states his hep C has clear.      7. FOLLOW UP: With me in 4 months.  A1C to be rechecked in 3 months- ordered today.    Time spent reviewing chart and labs today = 5 minutes.  Time for video visit today=  16 minutes.  Time for documentation today = 15 minutes.    TOTAL TIME FOR VISIT TODAY = 36 minutes.    Elizabeth Guzman PA-C

## 2023-02-13 ENCOUNTER — OFFICE VISIT (OUTPATIENT)
Dept: FAMILY MEDICINE | Facility: CLINIC | Age: 73
End: 2023-02-13
Payer: MEDICARE

## 2023-02-13 VITALS
SYSTOLIC BLOOD PRESSURE: 132 MMHG | BODY MASS INDEX: 35.67 KG/M2 | RESPIRATION RATE: 18 BRPM | HEART RATE: 79 BPM | WEIGHT: 254.8 LBS | HEIGHT: 71 IN | OXYGEN SATURATION: 98 % | DIASTOLIC BLOOD PRESSURE: 78 MMHG

## 2023-02-13 DIAGNOSIS — R53.83 OTHER FATIGUE: ICD-10-CM

## 2023-02-13 DIAGNOSIS — R06.83 SNORING: Primary | ICD-10-CM

## 2023-02-13 PROCEDURE — 99213 OFFICE O/P EST LOW 20 MIN: CPT | Mod: 25 | Performed by: FAMILY MEDICINE

## 2023-02-13 PROCEDURE — 90662 IIV NO PRSV INCREASED AG IM: CPT | Performed by: FAMILY MEDICINE

## 2023-02-13 PROCEDURE — 91313 COVID-19 VACCINE BIVALENT BOOSTER 18+ (MODERNA): CPT | Performed by: FAMILY MEDICINE

## 2023-02-13 PROCEDURE — 0134A COVID-19 VACCINE BIVALENT BOOSTER 18+ (MODERNA): CPT | Performed by: FAMILY MEDICINE

## 2023-02-13 PROCEDURE — G0008 ADMIN INFLUENZA VIRUS VAC: HCPCS | Mod: 59 | Performed by: FAMILY MEDICINE

## 2023-02-13 ASSESSMENT — PAIN SCALES - GENERAL: PAINLEVEL: NO PAIN (0)

## 2023-02-13 NOTE — PROGRESS NOTES
"Mark Garces  /78   Pulse 79   Resp 18   Ht 1.803 m (5' 11\")   Wt 115.6 kg (254 lb 12.8 oz)   SpO2 98%   BMI 35.54 kg/m       Assessment/Plan:                Mark was seen today for sleep apnea and imm/inj.    Diagnoses and all orders for this visit:    Snoring  -     Adult Sleep Eval & Management  Referral; Future    Other fatigue  -     Adult Sleep Eval & Management  Referral; Future    Other orders  -     INFLUENZA, QUAD, HIGH DOSE, PF, 65YR + (FLUZONE HD)  -     COVID-19,PF,MODERNA BIVALENT (18+YRS)         DISCUSSION  Update immunizations.    Referral to sleep specialist.    Recommended scheduling with eye care provider for routine eye exam, recommend follow-up colonoscopy as recommended by specialist following colonoscopy performed in March 2022.    Continue to follow recommendations of endocrinologist.    See additional discussion below.  Subjective:     HPI:    Mark Garces is a 72 year old male with a history of type 2 diabetes with neuropathy who follows with endocrinology.  He is here today to follow-up on sleep apnea concerns.  We also discussed a few other items today.    He has a history of snoring.  He has been told that he has woken up gasping in the past.  He does not feel he gets good restful sleep.  He bought a device on [employer] and did a home monitoring test which indicated that he had drops in oxygen saturation down to 80 overnight.  We previously discussed referral to sleep specialist.  Discussed referral with updated information.  Feel there is high probability of underlying sleep apnea.  Reviewed prior lab testing noted that he had thyroid checked in the not too distant past.  Discussed benefits of evaluation and potential treatment of sleep apnea if it does exist.    Today we discussed some musculoskeletal considerations.  He does deal with neuropathy.  This does affect balance.  He has some discomfort in the feet and leg.  He does have somewhat limited " mobility.  Discussed the importance of maintaining activity.    Discussed updating immunizations today.    Discussed routine eye care and repeat colonoscopy.        ROS:  Complete review of systems is obtained.  Other than the specific considerations noted above complete review of systems is negative.          Objective:   Medications:  Current Outpatient Medications   Medication     aspirin 81 MG tablet     blood glucose (NO BRAND SPECIFIED) test strip     empagliflozin (JARDIANCE) 25 MG TABS tablet     lisinopril (ZESTRIL) 10 MG tablet     metFORMIN (GLUCOPHAGE) 500 MG tablet     Omega-3 Fatty Acids (FISH OIL) 500 MG CAPS     simvastatin (ZOCOR) 40 MG tablet     sitagliptin (JANUVIA) 100 MG tablet     No current facility-administered medications for this visit.        Allergies:   No Known Allergies     Social History     Socioeconomic History     Marital status:      Spouse name: Not on file     Number of children: Not on file     Years of education: Not on file     Highest education level: Not on file   Occupational History     Not on file   Tobacco Use     Smoking status: Former     Types: Cigarettes     Quit date: 2011     Years since quittin.2     Smokeless tobacco: Never   Vaping Use     Vaping Use: Never used   Substance and Sexual Activity     Alcohol use: No     Drug use: No     Sexual activity: Not on file   Other Topics Concern     Parent/sibling w/ CABG, MI or angioplasty before 65F 55M? Not Asked   Social History Narrative     Not on file     Social Determinants of Health     Financial Resource Strain: Not on file   Food Insecurity: Not on file   Transportation Needs: Not on file   Physical Activity: Not on file   Stress: Not on file   Social Connections: Not on file   Intimate Partner Violence: Not on file   Housing Stability: Not on file       Family History   Problem Relation Age of Onset     Cerebrovascular Disease Sister      Diabetes Maternal Grandmother         Most Recent  "Immunizations   Administered Date(s) Administered     COVID-19 Vaccine (Ruben) 03/08/2021     COVID-19,PF,Moderna Booster 06/10/2022     Influenza (High Dose) 3 valent vaccine 12/07/2017     Influenza (IIV3) PF 11/12/2014     Influenza Vaccine >6 months (Alfuria,Fluzone) 11/19/2013     Pneumococcal 23 valent 11/27/2012   Pended Date(s) Pended     COVID-19 Vaccine Bivalent Booster 18+ (Moderna) 02/13/2023     Influenza Vaccine 65+ (Fluzone HD) 02/13/2023        Wt Readings from Last 3 Encounters:   02/13/23 115.6 kg (254 lb 12.8 oz)   06/10/22 118.6 kg (261 lb 6.4 oz)   11/30/21 117 kg (258 lb)        BP Readings from Last 6 Encounters:   02/13/23 132/78   06/10/22 138/80   11/30/21 124/74   12/10/19 (!) 144/82   04/09/19 146/80   10/09/18 126/76        Hemoglobin A1C   Date Value Ref Range Status   01/12/2023 8.3 (H) 0.0 - 5.6 % Final     Comment:     Normal <5.7%   Prediabetes 5.7-6.4%    Diabetes 6.5% or higher     Note: Adopted from ADA consensus guidelines.   06/10/2022 7.6 (H) 0.0 - 5.6 % Final     Comment:     Normal <5.7%   Prediabetes 5.7-6.4%    Diabetes 6.5% or higher     Note: Adopted from ADA consensus guidelines.   11/15/2021 7.4 (H) 0.0 - 5.6 % Final     Comment:     Normal <5.7%   Prediabetes 5.7-6.4%    Diabetes 6.5% or higher     Note: Adopted from ADA consensus guidelines.   09/01/2020 7.3 (H) 0 - 5.6 % Final     Comment:     Normal <5.7% Prediabetes 5.7-6.4%  Diabetes 6.5% or higher - adopted from ADA   consensus guidelines.     07/17/2013 7.0 (A) 4.3 - 6.0 % Final   03/19/2013 7.2 (A) 4.3 - 6.0 % Final     Hemoglobin A1C POCT   Date Value Ref Range Status   12/10/2019 7.2 (A) 4.3 - 6.0 % Final   04/09/2019 6.8 (A) 4.3 - 6.0 % Final   10/09/2018 6.7 (A) 4.3 - 6.0 % Final        PHYSICAL EXAM:    /78   Pulse 79   Resp 18   Ht 1.803 m (5' 11\")   Wt 115.6 kg (254 lb 12.8 oz)   SpO2 98%   BMI 35.54 kg/m       General: Patient alert no signs of distress    Lungs: Good air movement no " wheeze or crackle    Heart: Regular rate and rhythm no murmur    Examination of the right foot reveals that there is a bony prominence near the proximal metacarpal head of the third metacarpal.  Mildly tender to the touch.  Likely represents benign bony hypertrophy.                          Answers for HPI/ROS submitted by the patient on 2/10/2023  What is the reason for your visit today? : sleep study  flu shot covid boost  On average, how many sweetened beverages do you drink each day (Examples: soda, juice, sweet tea, etc.  Do NOT count diet or artificially sweetened beverages)?: 0  How many days a week do you exercise enough to make your heart beat faster?: 3 or less  How many days per week do you miss taking your medication?: 0

## 2023-04-23 DIAGNOSIS — E11.65 TYPE 2 DIABETES MELLITUS WITH HYPERGLYCEMIA, WITHOUT LONG-TERM CURRENT USE OF INSULIN (H): ICD-10-CM

## 2023-04-26 NOTE — TELEPHONE ENCOUNTER
METFORMIN  MG TABLET      Last Written Prescription Date:  1/19/23  Last Fill Quantity: 360,   # refills: 3  Last Office Visit : 1/19/23  Future Office visit:  5/4/23    Routing refill request to provider for review/approval because:  Overdue for A1c - has a lab appt 4/27/23 for A1c drawn    90 day patricia refill sent.

## 2023-04-27 ENCOUNTER — LAB (OUTPATIENT)
Dept: LAB | Facility: CLINIC | Age: 73
End: 2023-04-27
Payer: MEDICARE

## 2023-04-27 DIAGNOSIS — E11.65 TYPE 2 DIABETES MELLITUS WITH HYPERGLYCEMIA, WITHOUT LONG-TERM CURRENT USE OF INSULIN (H): ICD-10-CM

## 2023-04-27 LAB — HBA1C MFR BLD: 7.3 % (ref 0–5.6)

## 2023-04-27 PROCEDURE — 36415 COLL VENOUS BLD VENIPUNCTURE: CPT

## 2023-04-27 PROCEDURE — 83036 HEMOGLOBIN GLYCOSYLATED A1C: CPT

## 2023-04-27 NOTE — PROGRESS NOTES
Outcome for 04/27/23 9:26 AM: copygram message sent  Lou Trevizo MA  Outcome for 05/02/23 2:53 PM: Patient is not checking blood sugars  Lou Trevizo MA    Patient is showing 5/5 [place] met.   Lou Trevizo MA

## 2023-05-02 ENCOUNTER — TELEPHONE (OUTPATIENT)
Dept: ENDOCRINOLOGY | Facility: CLINIC | Age: 73
End: 2023-05-02
Payer: MEDICARE

## 2023-05-04 ENCOUNTER — VIRTUAL VISIT (OUTPATIENT)
Dept: ENDOCRINOLOGY | Facility: CLINIC | Age: 73
End: 2023-05-04
Payer: MEDICARE

## 2023-05-04 DIAGNOSIS — E11.8 TYPE 2 DIABETES MELLITUS WITH COMPLICATION, WITH LONG-TERM CURRENT USE OF INSULIN (H): Primary | ICD-10-CM

## 2023-05-04 DIAGNOSIS — E11.65 TYPE 2 DIABETES MELLITUS WITH HYPERGLYCEMIA, WITHOUT LONG-TERM CURRENT USE OF INSULIN (H): ICD-10-CM

## 2023-05-04 DIAGNOSIS — Z79.4 TYPE 2 DIABETES MELLITUS WITH COMPLICATION, WITH LONG-TERM CURRENT USE OF INSULIN (H): Primary | ICD-10-CM

## 2023-05-04 PROCEDURE — 99214 OFFICE O/P EST MOD 30 MIN: CPT | Mod: VID | Performed by: PHYSICIAN ASSISTANT

## 2023-05-04 NOTE — PROGRESS NOTES
Due to the COVID 19 pandemic this visit was converted to a video visit in order to help prevent spread of infection in this patient and the general population.    Time of start: 11:00 am  Time of end: 11:14 am  Total duration of video visit: 14 minutes.  Providers location: offsite.  Patients location: [place].    [facility]   Mark Garces is a 72 year old male with type 2 diabetes mellitus. Video visit for diabetes follow up.  Pt's hx is significant for type 2 DM dx in 2007, obesity, hyperlipidemia, COVID19 infection Nov 2020 and past hx of Hepatitis C which has been treated successfully.  He was seen by Oph in Nov 2021 and was told he has diabetic retinopathy.  He also has a hx of microalbuminuria.  No sx of diabetic neuropathy.  For his diabetes, he is currently taking Metformin 500 mg 2 tabs po BID, Jardiance 25 mg daily and Januvia 100 mg daily.  Most recent A1C was 7.3 % on 4/27/2023 and previous A1C was  8.3 % on 1/12/2023.  Mark has not been checking his blood sugar.  Pt states he is not checking his blood sugar at home because he does not like to stick himself with the lancets. He was told his insurance will not cover a Freestyle Ama sensor or CGM.  On ROS today, he reports feeling better overall.  He has chronic back, hip, knee and right leg pain.    Pt denies frequent headaches, blurred vision,  n/v, SOB at rest, fever, chills or chest pain.  No abd pain, diarrhea, dysuria, hematuria or foot ulcers.  Some numbness and pain intermittently in this right leg. He has hx chronic back issues.  No stool/urine incontinence.  He denies any numbness, tingling or pain in his left foot/leg.    DIABETES CARE:  Retinopathy: yes; seen by Oph in Nov 2021 at [facility] and he was told he has diabetic retinopathy. Reminded him to schedule a diabetic eye exam.  Nephropathy: urine microalbuminuria + in 6/2022.  Pt taking Lisinopril.  Neuropathy: no diabetic neuropathy.  Foot exam: no exam today.  Taking ASA:  yes.  Lipids: LDL 89 in Jan 20223. Pt taking Simvastatin.  Insulin: none.   DM meds: Metformin, Jardiance and Januvia.  Testing: not testing.   Freestyle Ama sensor/CGM not covered by insurance.    ROS   Please see under HPI.     ALLERGIES:   No Known Allergies      Prescription Medications as of 5/4/2023       Rx Number Disp Refills Start End Last Dispensed Date Next Fill Date Owning Pharmacy    aspirin 81 MG tablet            Sig: Take 1 tablet by mouth daily.    Class: Historical    Route: Oral    blood glucose (NO BRAND SPECIFIED) test strip  250 strip 4 6/22/2022    [facility]/pharmacy [address] [place] [address]    Sig: Use to test blood sugar twice daily.  ONE TOUCH ULTRA STRIPS    Class: E-Prescribe    empagliflozin (JARDIANCE) 25 MG TABS tablet  90 tablet 3 5/4/2023    [facility]/pharmacy [address] [place] [address]    Sig: Take 1 tablet (25 mg) by mouth daily    Class: E-Prescribe    Route: Oral    lisinopril (ZESTRIL) 10 MG tablet  90 tablet 3 1/19/2023    [facility]/pharmacy [address] [place] [address]    Sig: Take 1 tablet (10 mg) by mouth daily    Class: E-Prescribe    Route: Oral    metFORMIN (GLUCOPHAGE) 500 MG tablet  360 tablet 0 5/4/2023    [facility]/pharmacy #[address] [place] [address]    Sig: TAKE 2 TABLETS BY MOUTH TWICE A DAY.    Class: E-Prescribe    Omega-3 Fatty Acids (FISH OIL) 500 MG CAPS            Sig: Take 2 capsules by mouth daily.    Class: Historical    Route: Oral    simvastatin (ZOCOR) 40 MG tablet  90 tablet 3 1/19/2023        Sig: Take 1 tablet (40 mg) by mouth daily    Class: Historical    Route: Oral    sitagliptin (JANUVIA) 100 MG tablet  90 tablet 3 1/19/2023    [facility]/pharmacy #[address] [place] [address]    Sig: Take 1 tablet (100 mg) by mouth daily    Class: E-Prescribe    Route: Oral        Family Hx   No family hx of diabetes.    Personal Hx   Smoke: None at this time.  ETOH : None at this time.    PMH   1. Type 2 Diabetes Mellitus dx  in 2007.   2. Hepatitis C; pt underwent tx for Hep C and his Hep C has cleared.  3. Obesity.  4. Past hx of nicotine use.   5. Hx of undescended testis s/p surgery in 1971.   6. Anemia while undergoing hep C treatment.  7. Retinopathy.  8. Microalbuminuria.  9. Hyperlipidemia.      Physical Exam   No exam today.    Results   Creatinine   Date Value Ref Range Status   06/10/2022 0.80 0.70 - 1.30 mg/dL Final   06/10/2022 0.80 0.70 - 1.30 mg/dL Final   09/01/2020 0.90 0.66 - 1.25 mg/dL Final     GFR Estimate   Date Value Ref Range Status   06/10/2022 >90 >60 mL/min/1.73m2 Final     Comment:     Effective December 21, 2021 eGFRcr in adults is calculated using the 2021 CKD-EPI creatinine equation which includes age and gender (Wilber et al., [facility], DOI: 10.1056/EDZAve6776115)   06/10/2022 >90 >60 mL/min/1.73m2 Final     Comment:     Effective December 21, 2021 eGFRcr in adults is calculated using the 2021 CKD-EPI creatinine equation which includes age and gender ( et al., [facility], DOI: 10.1056/XSDJyb2905970)   09/01/2020 86 >60 mL/min/[1.73_m2] Final     Comment:     Non  GFR Calc  Starting 12/18/2018, serum creatinine based estimated GFR (eGFR) will be   calculated using the Chronic Kidney Disease Epidemiology Collaboration   (CKD-EPI) equation.       Hemoglobin A1C   Date Value Ref Range Status   04/27/2023 7.3 (H) 0.0 - 5.6 % Final     Comment:     Normal <5.7%   Prediabetes 5.7-6.4%    Diabetes 6.5% or higher     Note: Adopted from ADA consensus guidelines.   09/01/2020 7.3 (H) 0 - 5.6 % Final     Comment:     Normal <5.7% Prediabetes 5.7-6.4%  Diabetes 6.5% or higher - adopted from ADA   consensus guidelines.       Potassium   Date Value Ref Range Status   06/10/2022 4.5 3.5 - 5.0 mmol/L Final   04/09/2019 4.0 3.4 - 5.3 mmol/L Final     ALT   Date Value Ref Range Status   11/15/2021 12 0 - 45 U/L Final   09/01/2020 18 0 - 70 U/L Final     AST   Date Value Ref Range Status   11/15/2021 11 0 - 40 U/L  Final   09/01/2020 7 0 - 45 U/L Final     TSH   Date Value Ref Range Status   11/15/2021 2.95 0.30 - 5.00 uIU/mL Final   09/01/2020 2.85 0.40 - 4.00 mU/L Final     T4 Free   Date Value Ref Range Status   09/07/2017 1.02 0.76 - 1.46 ng/dL Final         Cholesterol   Date Value Ref Range Status   01/12/2023 164 <200 mg/dL Final   11/15/2021 160 <=199 mg/dL Final   09/01/2020 146 <200 mg/dL Final   04/09/2019 152 <200 mg/dL Final     HDL Cholesterol   Date Value Ref Range Status   09/01/2020 53 >39 mg/dL Final   04/09/2019 55 >39 mg/dL Final     Direct Measure HDL   Date Value Ref Range Status   01/12/2023 51 >=40 mg/dL Final   11/15/2021 52 >=40 mg/dL Final     Comment:     HDL Cholesterol Reference Range:     0-2 years:   No reference ranges established for patients under 2 years old  at [employer] for lipid analytes.    2-8 years:  Greater than 45 mg/dL     18 years and older:   Female: Greater than or equal to 50 mg/dL   Male:   Greater than or equal to 40 mg/dL     LDL Cholesterol Calculated   Date Value Ref Range Status   01/12/2023 89 <=100 mg/dL Final   11/15/2021 93 <=129 mg/dL Final   09/01/2020 79 <100 mg/dL Final     Comment:     Desirable:       <100 mg/dl   04/09/2019 77 <100 mg/dL Final     Comment:     Desirable:       <100 mg/dl     Triglycerides   Date Value Ref Range Status   01/12/2023 118 <150 mg/dL Final   11/15/2021 74 <=149 mg/dL Final   09/01/2020 71 <150 mg/dL Final   04/09/2019 96 <150 mg/dL Final     Cholesterol/HDL Ratio   Date Value Ref Range Status   08/20/2014 2.8 0.0 - 5.0 Final   11/19/2013 3.7 0.0 - 5.0 Final       ASSESSMENT/PLAN:     1. TYPE 2 DIABETES MELLITUS: Patient's A1C has improved with addition of Jardiance. A1C 7.3 % on 4/27/2023.  Continue Jardiance 25 mg each am, Januvia 100 mg daily and Metformin 1000 mg BID.  Mark would like to avoid injectable drugs.   He has been working on  making healthy food choices. Activity is limited - back, hip, leg and knee  pain.  I have no blood sugar readings since Mark does not check his blood sugar.  Freestyle Ama sensor/CGM is not covered by his insurance.  Again, I reviewed the recent studies on Jardiance- cardiovascular and renal benefits of this drug, how the drug works and possible side effects of the medication including dehydration, UTIs and groin yeast infection.  Pt's creat was 0.8 with GFR > 90 mL/min in June 2022.  He denies sx of diabetic neuropathy.    2.  RETINOPATHY: Seen by Oph 11/18/2021 and was told he has diabetic retinopathy.  Reminded him to see Oph annually.    3.  MICROALBUMINURIA: Urine microalbuminuria +.  Pt is taking Lisinopril.    4.  HYPERLIPIDEMIA: LDL 89 and HDL 51 in Jan 2022.  Pt is taking Simvastatin and fish oil.    5. OVERWEIGHT: Encouraged pt to make healthy food choices.    6.  HX OF HEPATITIS C: Pt has completed his hep C treatment and states his hep C has clear.      7. FOLLOW UP: With me in 6 months.  Creat/GFR, urine microalbuminuria, fasting lipid panel, TSH, AST and Vit B12 level ordered today.    Time spent reviewing chart and labs today = 5 minutes.  Time for video visit today=  14 minutes.  Time for documentation today = 15 minutes.    TOTAL TIME FOR VISIT TODAY = 34 minutes.    Elizabeth Guzman PA-C          Answers for HPI/ROS submitted by the patient on 5/4/2023  General Symptoms: No  Skin Symptoms: No  HENT Symptoms: No  EYE SYMPTOMS: No  HEART SYMPTOMS: No  LUNG SYMPTOMS: No  INTESTINAL SYMPTOMS: No  URINARY SYMPTOMS: No  REPRODUCTIVE SYMPTOMS: No  SKELETAL SYMPTOMS: No  BLOOD SYMPTOMS: No  NERVOUS SYSTEM SYMPTOMS: No  MENTAL HEALTH SYMPTOMS: No

## 2023-05-04 NOTE — LETTER
5/4/2023       RE: Mark Garces  [address]  [facility] [address]     Dear Colleague,    Thank you for referring your patient, Mark Garces, to the [facility] [place] at [facility]. Please see a copy of my visit note below.    Outcome for 04/27/23 9:26 AM: WISE s.r.l message sent  Lou Trevizo MA  Outcome for 05/02/23 2:53 PM: Patient is not checking blood sugars  Lou Trevizo MA    Patient is showing 5/5 [place] met.   Lou Trevizo MA    Due to the COVID 19 pandemic this visit was converted to a video visit in order to help prevent spread of infection in this patient and the general population.    Time of start: 11:00 am  Time of end: 11:14 am  Total duration of video visit: 14 minutes.  Providers location: offsite.  Patients location: [place].    [facility]   Mark Garces is a 72 year old male with type 2 diabetes mellitus. Video visit for diabetes follow up.  Pt's hx is significant for type 2 DM dx in 2007, obesity, hyperlipidemia, COVID19 infection Nov 2020 and past hx of Hepatitis C which has been treated successfully.  He was seen by Oph in Nov 2021 and was told he has diabetic retinopathy.  He also has a hx of microalbuminuria.  No sx of diabetic neuropathy.  For his diabetes, he is currently taking Metformin 500 mg 2 tabs po BID, Jardiance 25 mg daily and Januvia 100 mg daily.  Most recent A1C was 7.3 % on 4/27/2023 and previous A1C was  8.3 % on 1/12/2023.  Mark has not been checking his blood sugar.  Pt states he is not checking his blood sugar at home because he does not like to stick himself with the lancets. He was told his insurance will not cover a Freestyle Ama sensor or CGM.  On ROS today, he reports feeling better overall.  He has chronic back, hip, knee and right leg pain.    Pt denies frequent headaches, blurred vision,  n/v, SOB at rest, fever, chills or chest pain.  No abd pain, diarrhea, dysuria, hematuria  or foot ulcers.  Some numbness and pain intermittently in this right leg. He has hx chronic back issues.  No stool/urine incontinence.  He denies any numbness, tingling or pain in his left foot/leg.    DIABETES CARE:  Retinopathy: yes; seen by Oph in Nov 2021 at [facility] in [place] and he was told he has diabetic retinopathy. Reminded him to schedule a diabetic eye exam.  Nephropathy: urine microalbuminuria + in 6/2022.  Pt taking Lisinopril.  Neuropathy: no diabetic neuropathy.  Foot exam: no exam today.  Taking ASA: yes.  Lipids: LDL 89 in Jan 20223. Pt taking Simvastatin.  Insulin: none.   DM meds: Metformin, Jardiance and Januvia.  Testing: not testing.   Freestyle Ama sensor/CGM not covered by insurance.    ROS   Please see under HPI.     ALLERGIES:   No Known Allergies      Prescription Medications as of 5/4/2023         Rx Number Disp Refills Start End Last Dispensed Date Next Fill Date Owning Pharmacy    aspirin 81 MG tablet            Sig: Take 1 tablet by mouth daily.    Class: Historical    Route: Oral    blood glucose (NO BRAND SPECIFIED) test strip  250 strip 4 6/22/2022    [facility]/pharmacy [address] [place] [address]    Sig: Use to test blood sugar twice daily.  ONE TOUCH ULTRA STRIPS    Class: E-Prescribe    empagliflozin (JARDIANCE) 25 MG TABS tablet  90 tablet 3 5/4/2023    [facility]/pharmacy [address] [place] [address]    Sig: Take 1 tablet (25 mg) by mouth daily    Class: E-Prescribe    Route: Oral    lisinopril (ZESTRIL) 10 MG tablet  90 tablet 3 1/19/2023    [facility]/pharmacy [address] [place] [address]    Sig: Take 1 tablet (10 mg) by mouth daily    Class: E-Prescribe    Route: Oral    metFORMIN (GLUCOPHAGE) 500 MG tablet  360 tablet 0 5/4/2023    [facility]/pharmacy [address] [place] [address]    Sig: TAKE 2 TABLETS BY MOUTH TWICE A DAY.    Class: E-Prescribe    Omega-3 Fatty Acids (FISH OIL) 500 MG CAPS            Sig: Take 2 capsules by mouth daily.     Class: Historical    Route: Oral    simvastatin (ZOCOR) 40 MG tablet  90 tablet 3 1/19/2023        Sig: Take 1 tablet (40 mg) by mouth daily    Class: Historical    Route: Oral    sitagliptin (JANUVIA) 100 MG tablet  90 tablet 3 1/19/2023    [facility]/pharmacy #[id number] [place], [place] - [address] [facility]    Sig: Take 1 tablet (100 mg) by mouth daily    Class: E-Prescribe    Route: Oral          Family Hx   No family hx of diabetes.    Personal Hx   Smoke: None at this time.  ETOH : None at this time.    PMH   1. Type 2 Diabetes Mellitus dx in 2007.   2. Hepatitis C; pt underwent tx for Hep C and his Hep C has cleared.  3. Obesity.  4. Past hx of nicotine use.   5. Hx of undescended testis s/p surgery in 1971.   6. Anemia while undergoing hep C treatment.  7. Retinopathy.  8. Microalbuminuria.  9. Hyperlipidemia.      Physical Exam   No exam today.    Results   Creatinine   Date Value Ref Range Status   06/10/2022 0.80 0.70 - 1.30 mg/dL Final   06/10/2022 0.80 0.70 - 1.30 mg/dL Final   09/01/2020 0.90 0.66 - 1.25 mg/dL Final     GFR Estimate   Date Value Ref Range Status   06/10/2022 >90 >60 mL/min/1.73m2 Final     Comment:     Effective December 21, 2021 eGFRcr in adults is calculated using the 2021 CKD-EPI creatinine equation which includes age and gender ( et al., NEJ, DOI: 10.1056/VTFApr0610808)   06/10/2022 >90 >60 mL/min/1.73m2 Final     Comment:     Effective December 21, 2021 eGFRcr in adults is calculated using the 2021 CKD-EPI creatinine equation which includes age and gender ( et al., NEJ, DOI: 10.1056/QYSIsj1105658)   09/01/2020 86 >60 mL/min/[1.73_m2] Final     Comment:     Non  GFR Calc  Starting 12/18/2018, serum creatinine based estimated GFR (eGFR) will be   calculated using the Chronic Kidney Disease Epidemiology Collaboration   (CKD-EPI) equation.       Hemoglobin A1C   Date Value Ref Range Status   04/27/2023 7.3 (H) 0.0 - 5.6 % Final     Comment:     Normal <5.7%   Prediabetes  5.7-6.4%    Diabetes 6.5% or higher     Note: Adopted from ADA consensus guidelines.   09/01/2020 7.3 (H) 0 - 5.6 % Final     Comment:     Normal <5.7% Prediabetes 5.7-6.4%  Diabetes 6.5% or higher - adopted from ADA   consensus guidelines.       Potassium   Date Value Ref Range Status   06/10/2022 4.5 3.5 - 5.0 mmol/L Final   04/09/2019 4.0 3.4 - 5.3 mmol/L Final     ALT   Date Value Ref Range Status   11/15/2021 12 0 - 45 U/L Final   09/01/2020 18 0 - 70 U/L Final     AST   Date Value Ref Range Status   11/15/2021 11 0 - 40 U/L Final   09/01/2020 7 0 - 45 U/L Final     TSH   Date Value Ref Range Status   11/15/2021 2.95 0.30 - 5.00 uIU/mL Final   09/01/2020 2.85 0.40 - 4.00 mU/L Final     T4 Free   Date Value Ref Range Status   09/07/2017 1.02 0.76 - 1.46 ng/dL Final         Cholesterol   Date Value Ref Range Status   01/12/2023 164 <200 mg/dL Final   11/15/2021 160 <=199 mg/dL Final   09/01/2020 146 <200 mg/dL Final   04/09/2019 152 <200 mg/dL Final     HDL Cholesterol   Date Value Ref Range Status   09/01/2020 53 >39 mg/dL Final   04/09/2019 55 >39 mg/dL Final     Direct Measure HDL   Date Value Ref Range Status   01/12/2023 51 >=40 mg/dL Final   11/15/2021 52 >=40 mg/dL Final     Comment:     HDL Cholesterol Reference Range:     0-2 years:   No reference ranges established for patients under 2 years old  at [employer] for lipid analytes.    2-8 years:  Greater than 45 mg/dL     18 years and older:   Female: Greater than or equal to 50 mg/dL   Male:   Greater than or equal to 40 mg/dL     LDL Cholesterol Calculated   Date Value Ref Range Status   01/12/2023 89 <=100 mg/dL Final   11/15/2021 93 <=129 mg/dL Final   09/01/2020 79 <100 mg/dL Final     Comment:     Desirable:       <100 mg/dl   04/09/2019 77 <100 mg/dL Final     Comment:     Desirable:       <100 mg/dl     Triglycerides   Date Value Ref Range Status   01/12/2023 118 <150 mg/dL Final   11/15/2021 74 <=149 mg/dL Final   09/01/2020 71 <150  mg/dL Final   04/09/2019 96 <150 mg/dL Final     Cholesterol/HDL Ratio   Date Value Ref Range Status   08/20/2014 2.8 0.0 - 5.0 Final   11/19/2013 3.7 0.0 - 5.0 Final       ASSESSMENT/PLAN:     1. TYPE 2 DIABETES MELLITUS: Patient's A1C has improved with addition of Jardiance. A1C 7.3 % on 4/27/2023.  Continue Jardiance 25 mg each am, Januvia 100 mg daily and Metformin 1000 mg BID.  Mark would like to avoid injectable drugs.   He has been working on  making healthy food choices. Activity is limited - back, hip, leg and knee pain.  I have no blood sugar readings since Mark does not check his blood sugar.  Freestyle Ama sensor/CGM is not covered by his insurance.  Again, I reviewed the recent studies on Jardiance- cardiovascular and renal benefits of this drug, how the drug works and possible side effects of the medication including dehydration, UTIs and groin yeast infection.  Pt's creat was 0.8 with GFR > 90 mL/min in June 2022.  He denies sx of diabetic neuropathy.    2.  RETINOPATHY: Seen by Oph 11/18/2021 and was told he has diabetic retinopathy.  Reminded him to see Oph annually.    3.  MICROALBUMINURIA: Urine microalbuminuria +.  Pt is taking Lisinopril.    4.  HYPERLIPIDEMIA: LDL 89 and HDL 51 in Jan 2022.  Pt is taking Simvastatin and fish oil.    5. OVERWEIGHT: Encouraged pt to make healthy food choices.    6.  HX OF HEPATITIS C: Pt has completed his hep C treatment and states his hep C has clear.      7. FOLLOW UP: With me in 6 months.  Creat/GFR, urine microalbuminuria, fasting lipid panel, TSH, AST and Vit B12 level ordered today.    Time spent reviewing chart and labs today = 5 minutes.  Time for video visit today=  14 minutes.  Time for documentation today = 15 minutes.    TOTAL TIME FOR VISIT TODAY = 34 minutes.    Elizabeth Guzman PA-C          Answers for HPI/ROS submitted by the patient on 5/4/2023  General Symptoms: No  Skin Symptoms: No  HENT Symptoms: No  EYE SYMPTOMS: No  HEART SYMPTOMS:  No  LUNG SYMPTOMS: No  INTESTINAL SYMPTOMS: No  URINARY SYMPTOMS: No  REPRODUCTIVE SYMPTOMS: No  SKELETAL SYMPTOMS: No  BLOOD SYMPTOMS: No  NERVOUS SYSTEM SYMPTOMS: No  MENTAL HEALTH SYMPTOMS: No

## 2023-05-04 NOTE — NURSING NOTE
Is the patient currently in the state of [place]? YES    Visit mode:VIDEO    If the visit is dropped, the patient can be reconnected by: VIDEO VISIT: Text to cell phone: [phone]    Will anyone else be joining the visit? NO      How would you like to obtain your AVS? MyChart    Are changes needed to the allergy or medication list? NO    Reason for visit: Follow Up and Video Visit

## 2023-06-05 ENCOUNTER — OFFICE VISIT (OUTPATIENT)
Dept: SLEEP MEDICINE | Facility: CLINIC | Age: 73
End: 2023-06-05
Attending: FAMILY MEDICINE
Payer: MEDICARE

## 2023-06-05 VITALS
HEIGHT: 71 IN | HEART RATE: 71 BPM | BODY MASS INDEX: 34.66 KG/M2 | OXYGEN SATURATION: 97 % | DIASTOLIC BLOOD PRESSURE: 56 MMHG | WEIGHT: 247.56 LBS | SYSTOLIC BLOOD PRESSURE: 113 MMHG

## 2023-06-05 DIAGNOSIS — G47.30 SLEEP-RELATED BREATHING DISORDER: ICD-10-CM

## 2023-06-05 DIAGNOSIS — R06.83 SNORING: ICD-10-CM

## 2023-06-05 DIAGNOSIS — R53.83 OTHER FATIGUE: ICD-10-CM

## 2023-06-05 DIAGNOSIS — G47.33 SEVERE OBSTRUCTIVE SLEEP APNEA: Primary | ICD-10-CM

## 2023-06-05 DIAGNOSIS — G47.34 SLEEP-RELATED HYPOXIA: ICD-10-CM

## 2023-06-05 PROCEDURE — 99205 OFFICE O/P NEW HI 60 MIN: CPT | Performed by: NURSE PRACTITIONER

## 2023-06-05 RX ORDER — ZOLPIDEM TARTRATE 10 MG/1
TABLET ORAL
Qty: 1 TABLET | Refills: 0 | Status: SHIPPED | OUTPATIENT
Start: 2023-06-05 | End: 2023-10-05

## 2023-06-05 ASSESSMENT — SLEEP AND FATIGUE QUESTIONNAIRES
HOW LIKELY ARE YOU TO NOD OFF OR FALL ASLEEP WHEN YOU ARE A PASSENGER IN A CAR FOR AN HOUR WITHOUT A BREAK: WOULD NEVER DOZE
HOW LIKELY ARE YOU TO NOD OFF OR FALL ASLEEP WHILE SITTING INACTIVE IN A PUBLIC PLACE: WOULD NEVER DOZE
HOW LIKELY ARE YOU TO NOD OFF OR FALL ASLEEP WHILE WATCHING TV: SLIGHT CHANCE OF DOZING
HOW LIKELY ARE YOU TO NOD OFF OR FALL ASLEEP WHILE SITTING AND READING: WOULD NEVER DOZE
HOW LIKELY ARE YOU TO NOD OFF OR FALL ASLEEP WHILE LYING DOWN TO REST IN THE AFTERNOON WHEN CIRCUMSTANCES PERMIT: SLIGHT CHANCE OF DOZING
HOW LIKELY ARE YOU TO NOD OFF OR FALL ASLEEP IN A CAR, WHILE STOPPED FOR A FEW MINUTES IN TRAFFIC: WOULD NEVER DOZE
HOW LIKELY ARE YOU TO NOD OFF OR FALL ASLEEP WHILE SITTING AND TALKING TO SOMEONE: WOULD NEVER DOZE
HOW LIKELY ARE YOU TO NOD OFF OR FALL ASLEEP WHILE SITTING QUIETLY AFTER LUNCH WITHOUT ALCOHOL: WOULD NEVER DOZE

## 2023-06-05 NOTE — NURSING NOTE
"Chief Complaint   Patient presents with     Consult     Snoring     Sleep Problem     Stop breathing at night & low oxygen at night        Initial /56   Pulse 71   Ht 1.803 m (5' 11\")   Wt 112.3 kg (247 lb 9 oz)   SpO2 97%   BMI 34.53 kg/m   Estimated body mass index is 34.53 kg/m  as calculated from the following:    Height as of this encounter: 1.803 m (5' 11\").    Weight as of this encounter: 112.3 kg (247 lb 9 oz).    Medication Reconciliation: complete    Neck circumference: 17 inches / 43 centimeters.    DME:     PALMIRA Durham  [facility]      "

## 2023-06-05 NOTE — NURSING NOTE
Sleep study and return visit has been scheduled. AVS and PSG instruction packet given to patient.     Kenya Meyer [facility] [place]

## 2023-06-05 NOTE — PROGRESS NOTES
Outpatient Sleep Medicine Consultation:      Name: Mark Garces MRN# [id number]   Age: 72 year old YOB: 1950     Date of Consultation: June 5, 2023  Consultation is requested by: Eloy Anne MD  [address]  [address]  [place] [address] Eloy Anne  Primary care provider: Eloy Anne       Reason for Sleep Consult:     Mark Garces is sent by Eloy Anne for a sleep consultation regarding snoring, fatigue    Patient s Reason for visit  Mark GILDARDO Stallingsmary main reason for visit: Home study shows low sp02.  Patient states problem(s) started: 2021  Markduyen Garces's goals for this visit: Check need  for Cpac           Assessment and Plan:     Summary Sleep Diagnoses:  Sleep-related breathing disorder  Socially disruptive snoring  Snort arousals  Insomnia, sleep initiation, sleep maintenance  Nonrestorative sleep  Multiple nocturnal awakenings  Excessive daytime sleepiness      Comorbid Diagnoses:  Lumbar back pain  Cervicalgia  Morbid obesity  Diabetes mellitus type 2  Dyslipidemia  Anemia      Summary Recommendations:  Orders Placed This Encounter   Procedures     Comprehensive Sleep Study   1.  Recommend patient undergo sleep study.  Discussed benefits and limitations of both the polysomnographic studies well as a home sleep test, patient opted for polysomnographic test.  Patient STOP-BANG score is 5/8 indicating an increased pretest probability for obstructive sleep apnea.  In addition, the polysomnographic study will be able to ascertain obstructive sleep apnea, central sleep apnea, oxygen as well as carbon dioxide diagnoses the patient may have  2.  Recommend patient return to clinic approximately 2 weeks after completion of the sleep study.  This visit will be to review his results as well as to determine the next steps in his plan of care.  3.  Recommend patient optimize his sleep schedule as well as his sleep hygiene practices.  This is an effort to mitigate any further sleep  intrusions.  4.  Weight management to BMI of 30.0 if possible  5.  Recommend patient employ safe driving practices including not driving a motor vehicle should he become drowsy.    Summary Counselin.  Discussed pathophysiology of both obstructive sleep apnea as well as central sleep apnea  2.  Discussed implications of untreated sleep apnea in the context of his medical diagnoses  3.  Discussed all treatment measures for sleep apnea including PAP therapy, mandibular advancement device, and surgical options.  4.  Discussed possibility of pulmonary referral depending on the outcome of the sleep study.      Medical Decision-making:   Educational materials provided in instructions    Total time spent reviewing medical records, history and physical examination, review of previous testing and interpretation as well as documentation on this date: 60 minutes    CC: Eloy Anne          History of Present Illness:     Mark Garces is a very pleasant 72-year-old male who presents to the sleep medicine clinic on the advice of his medical provider, Eloy Anne.    Mark has a past medical history notable for type 2 diabetes mellitus diagnosed in , obesity, class II, hyperlipidemia, COVID 19 infection in , past history of hepatitis C which has been treated successfully, lumbar back pain, cervicalgia, anemia.  Patient has a past history of cigarette smoking.  He has not ever smoked any drugs of abuse.  He does not drink alcohol    Mark describes moderate insomnia both sleep initiation as well as sleep maintenance.  He is only moderately satisfied with his quality of sleep.  Mark obtained a device from [employer] which monitors his oxygen levels.  He notes that his saturations dipped into the 80s, close to 80 itself during the night. Mark notes shortness of breath with activity, but no shortness of breath while laying flat, and he does not awaken short of breath.  He has no cough.  Patient  says the symptoms were present prior to his COVID infection last November, but the COVID infection did exacerbate them.    Mark would like to obtain a CPAP machine.  We spent time explaining that there is a process of obtaining a CPAP machine but testing needs to be accomplished first so we can make a diagnosis.    Patient notes multiple nocturnal awakenings, 3-5 episodes for elimination purposes.  Mark also does nap, twice weekly.  His naps last approximately 30 minutes, and he finds them refreshing.    Plan for Mark includes a PSG assessing for central sleep apnea, obstructive sleep apnea, combination, any oxygenation issues during sleep.  Mark and I discussed that there is a possibility that a pulmonary referral will be made after his sleep study has taken place.  He is not averse to CPAP therapy and I discussed that it may be that CPAP therapy may be initiated immediately after his sleep study.  He agrees with this plan.    Past Sleep Evaluations: None    SLEEP-WAKE SCHEDULE:     Work/School Days: Patient goes to school/work: No   Usually gets into bed at 10pm  Takes patient about 10-20 minutes to fall asleep  Has trouble falling asleep No prob. nights per week  Wakes up in the middle of the night 3-5. times.  Wakes up due to Use the bathroom  He has trouble falling back asleep 0 times a week.   It usually takes 5-10 minutes to get back to sleep  Patient is usually up at 7am  Uses alarm: No    Weekends/Non-work Days/All Other Days:  Usually gets into bed at     Takes patient about   to fall asleep  Patient is usually up at    Uses alarm: No    Sleep Need  Patient gets  6 hours sleep on average   Patient thinks he needs about 6-8 hours sleep    Mark Garces prefers to sleep in this position(s): Side   Patient states they do the following activities in bed: Watch TV    Naps  Patient takes a purposeful nap 2 days week times a week and naps are usually 1/2 hour in duration  He feels better after a nap:  Yes  He dozes off unintentionally   days per week  Patient has had a driving accident or near-miss due to sleepiness/drowsiness: No      SLEEP DISRUPTIONS:    Breathing/Snoring  Patient snores:Yes  Other people complain about his snoring: No  Patient has been told he stops breathing in his sleep:Yes  He has issues with the following: Stuffy nose when you wake up;Getting up to urinate more than once    Movement:  Patient gets pain, discomfort, with an urge to move:  Yes  It happens when he is resting:  No  It happens more at night:  Yes  Patient has been told he kicks his legs at night:  No     Behaviors in Sleep:  Mark Garces has experienced the following behaviors while sleeping:    He has experienced sudden muscle weakness during the day:        Is there anything else you would like your sleep provider to know:          CAFFEINE AND OTHER SUBSTANCES:    Patient consumes caffeinated beverages per day:     Last caffeine use is usually:    List of any prescribed or over the counter stimulants that patient takes:    List of any prescribed or over the counter sleep medication patient takes:    List of previous sleep medications that patient has tried: NONE  Patient drinks alcohol to help them sleep: No  Patient drinks alcohol near bedtime: No    Family History:  Patient has a family member been diagnosed with a sleep disorder:              SCALES:    EPWORTH SLEEPINESS SCALE         6/5/2023     7:21 AM    Helper Sleepiness Scale ( ESPINOZA Alatorre  [id number]<br>ESS - [place]/English - Final version - 21 Nov 07 - [facility] [place].)   Sitting and reading Would never doze   Watching TV Slight chance of dozing   Sitting, inactive in a public place (e.g. a theatre or a meeting) Would never doze   As a passenger in a car for an hour without a break Would never doze   Lying down to rest in the afternoon when circumstances permit Slight chance of dozing   Sitting and talking to someone Would never doze   Sitting quietly  "after a lunch without alcohol Would never doze   In a car, while stopped for a few minutes in traffic Would never doze   Charlotte Score (MC) 2   Charlotte Score (Sleep) 2         INSOMNIA SEVERITY INDEX (KALPANA)          6/4/2023     7:38 PM   Insomnia Severity Index (KALPANA)   Difficulty falling asleep 2   Difficulty staying asleep 2   Problems waking up too early 2   How SATISFIED/DISSATISFIED are you with your CURRENT sleep pattern? 2   How NOTICEABLE to others do you think your sleep problem is in terms of impairing the quality of your life? 0   How WORRIED/DISTRESSED are you about your current sleep problem? 1   To what extent do you consider your sleep problem to INTERFERE with your daily functioning (e.g. daytime fatigue, mood, ability to function at work/daily chores, concentration, memory, mood, etc.) CURRENTLY? 1   KALPANA Total Score 10       Guidelines for Scoring/Interpretation:  Total score categories:  0-7 = No clinically significant insomnia   8-14 = Subthreshold insomnia   15-21 = Clinical insomnia (moderate severity)  22-28 = Clinical insomnia (severe)  Used via courtesy of www.Aicentth.va.gov with permission from Joao Whitehead PhD., [facility]      STOP BANG         6/5/2023     2:01 PM   STOP BANG Questionnaire (  2008, the American Society of Anesthesiologists, Inc. [employer]   Neck Cir (cm) Clinic: 43 cm   B/P Clinic: 113/56   BMI Clinic: 34.53         GAD7         View : No data to display.                  CAGE-AID         View : No data to display.                CAGE-AID reprinted with permission from the [place] Medical Journal, SANDIE Christensen. and NAT Scott, \"Conjoint screening questionnaires for alcohol and drug abuse\" [place] Medical Journal 94: 135-140, 1995.      PATIENT HEALTH QUESTIONNAIRE-9 (PHQ - 9)         View : No data to display.                Developed by Frances Suarez, Nan Stein, Clemente Lechuga and colleagues, with an educational " juan from [employer] No permission required to reproduce, translate, display or distribute.        Allergies:    No Known Allergies    Medications:    Current Outpatient Medications   Medication Sig Dispense Refill     aspirin 81 MG tablet Take 1 tablet by mouth daily.       empagliflozin (JARDIANCE) 25 MG TABS tablet Take 1 tablet (25 mg) by mouth daily 90 tablet 3     lisinopril (ZESTRIL) 10 MG tablet Take 1 tablet (10 mg) by mouth daily 90 tablet 3     metFORMIN (GLUCOPHAGE) 500 MG tablet TAKE 2 TABLETS BY MOUTH TWICE A DAY. 360 tablet 0     Omega-3 Fatty Acids (FISH OIL) 500 MG CAPS Take 2 capsules by mouth daily.       simvastatin (ZOCOR) 40 MG tablet Take 1 tablet (40 mg) by mouth daily 90 tablet 3     sitagliptin (JANUVIA) 100 MG tablet Take 1 tablet (100 mg) by mouth daily 90 tablet 3     blood glucose (NO BRAND SPECIFIED) test strip Use to test blood sugar twice daily.  ONE TOUCH ULTRA STRIPS (Patient not taking: Reported on 6/5/2023) 250 strip 4       Problem List:  Patient Active Problem List    Diagnosis Date Noted     Morbid obesity (H) 11/30/2021     Priority: Medium     Dyslipidemia 11/29/2021     Priority: Medium     Formatting of this note might be different from the original.  Created by Conversion       Lower back pain 02/18/2015     Priority: Medium     Cervicalgia 02/18/2015     Priority: Medium     Anemia 08/29/2012     Priority: Medium     DM (diabetes mellitus), type 2 (H) 04/24/2012     Priority: Medium     Obesity 04/24/2012     Priority: Medium     Problem list name updated by automated process. Provider to review          Past Medical/Surgical History:  No past medical history on file.  No past surgical history on file.    Social History:  Social History     Socioeconomic History     Marital status:      Spouse name: Not on file     Number of children: Not on file     Years of education: Not on file     Highest education level: Not on file   Occupational History     Not on file  "  Tobacco Use     Smoking status: Former     Types: Cigarettes     Quit date: 2011     Years since quittin.5     Smokeless tobacco: Never   Vaping Use     Vaping status: Never Used   Substance and Sexual Activity     Alcohol use: No     Drug use: No     Sexual activity: Not on file   Other Topics Concern     Parent/sibling w/ CABG, MI or angioplasty before 65F 55M? Not Asked   Social History Narrative     Not on file     Social Determinants of Health     Financial Resource Strain: Not on file   Food Insecurity: Not on file   Transportation Needs: Not on file   Physical Activity: Not on file   Stress: Not on file   Social Connections: Not on file   Intimate Partner Violence: Not on file   Housing Stability: Not on file       Family History:  Family History   Problem Relation Age of Onset     Cerebrovascular Disease Sister      Diabetes Maternal Grandmother        Review of Systems:  A complete review of systems reviewed by me is negative with the exeption of what has been mentioned in the history of present illness.  In the last TWO WEEKS have you experienced any of the following symptoms?  Fevers: No  Night Sweats: No  Weight Gain: No  Pain at Night: Yes  Double Vision: No  Changes in Vision: No  Difficulty Breathing through Nose: No  Sore Throat in Morning: No  Dry Mouth in the Morning: No  Shortness of Breath Lying Flat: No  Shortness of Breath With Activity: Yes  Awakening with Shortness of Breath: No  Increased Cough: No  Heart Racing at Night: No  Swelling in Feet or Legs: No  Diarrhea at Night: No  Heartburn at Night: No  Urinating More than Once at Night: Yes  Losing Control of Urine at Night: No  Joint Pains at Night: Yes  Headaches in Morning: No  Weakness in Arms or Legs: No  Depressed Mood: No  Anxiety: No     Physical Examination:  Vitals: /56   Pulse 71   Ht 1.803 m (5' 11\")   Wt 112.3 kg (247 lb 9 oz)   SpO2 97%   BMI 34.53 kg/m    BMI= Body mass index is 34.53 kg/m .    Neck Cir " (cm): 43 cm (17 inches)    Physical Exam  Vitals and nursing note reviewed.   Constitutional:       Appearance: Normal appearance. He is obese.   HENT:      Head: Normocephalic and atraumatic.      Right Ear: External ear normal.      Left Ear: External ear normal.      Nose: Nose normal.      Mouth/Throat:      Mouth: Mucous membranes are moist.      Pharynx: Oropharynx is clear.   Eyes:      Conjunctiva/sclera: Conjunctivae normal.   Cardiovascular:      Rate and Rhythm: Normal rate and regular rhythm.      Pulses: Normal pulses.      Heart sounds: Normal heart sounds.   Pulmonary:      Effort: Pulmonary effort is normal.      Breath sounds: Normal breath sounds.   Musculoskeletal:         General: Swelling present.      Cervical back: Normal range of motion and neck supple.   Skin:     General: Skin is warm and dry.      Capillary Refill: Capillary refill takes less than 2 seconds.   Neurological:      General: No focal deficit present.      Mental Status: He is alert and oriented to person, place, and time.   Psychiatric:         Mood and Affect: Mood normal.         Behavior: Behavior normal.         Thought Content: Thought content normal.         Judgment: Judgment normal.         Mallampati Class: III.  Tonsillar Stage: 1  hidden by pillars         Data: All pertinent previous laboratory data reviewed     Recent Labs   Lab Test 06/10/22  1324 11/15/21  1147 09/01/20  0919     --   --    POTASSIUM 4.5 4.6  --    CHLORIDE 105  --   --    CO2 21*  --   --    ANIONGAP 14  --   --    * 166*  --    BUN 19  --   --    CR 0.80  0.80  --  0.90   LUDY 9.7  --   --        No results for input(s): WBC, RBC, HGB, HCT, MCV, MCH, MCHC, RDW, PLT in the last 43396 hours.    Recent Labs   Lab Test 11/15/21  1147   AST 11   ALT 12       TSH   Date Value   11/15/2021 2.95 uIU/mL   09/01/2020 2.85 mU/L   04/09/2019 2.72 mU/L       No results found for: UAMP, UBARB, BENZODIAZEUR, UCANN, UCOC, OPIT, UPCP    Iron  Saturation Index   Date/Time Value Ref Range Status   10/02/2012 10:24 AM 40 15 - 46 % Final     Ferritin   Date/Time Value Ref Range Status   10/02/2012 10:24  (H) 20 - 300 ng/mL Final       No results found for: PH, PHARTERIAL, PO2, [id number], SAT, PCO2, HCO3, BASEEXCESS, NOAH, BEB    @LABRCNTIPR(phv:4,pco2v:4,po2v:4,hco3v:4,jolly:4,o2per:4)@    Echocardiology: No results found for this or any previous visit (from the past 4320 hour(s)).    Chest x-ray: No results found for this or any previous visit from the past 365 days.      Chest CT: No results found for this or any previous visit from the past 365 days.      PFT: Most Recent B[facility]ze Pulmonary Function Testing    No results found for: 20001  No results found for: 20002  No results found for: 20003  No results found for: 20015  No results found for: 20016  No results found for: 20027  No results found for: 20028  No results found for: 20029  No results found for: 20079  No results found for: 20080  No results found for: 20081  No results found for: 20335  No results found for: 20105  No results found for: 20053  No results found for: 20054  No results found for: 20055      SPIKE Murphy CNP 6/5/2023   Sleep Medicine      This note was written with the assistance of the Dragon voice-dictation technology software. The final document, although reviewed, may contain errors. For corrections, please contact the office.

## 2023-06-05 NOTE — PATIENT INSTRUCTIONS
"Medicare and Medicaid patients starting on CPAP or biPAP on or after 5/12/2023  Medicare patients should schedule an IN PERSON CLINIC appointment to provide your CPAP/biPAP usage data to a provider within 90 days of starting CPAP  Virtual visits are no longer allowed for this visit for Medicare                MY TREATMENT INFORMATION FOR SLEEP APNEA-  Mark Garces    DOCTOR : SPIKE Murphy CNP    Am I having a sleep study at a sleep center?  --->Due to normal delays, you will be contacted within 2-4 weeks to schedule    Am I having a home sleep study?  --->Watch the video for the device you are using:    -/drop off device-   https://www.VoloMedia.com/watch?v=yGGFBdELGhk    -Disposable device sent out require phone/computer application-   https://www.VoloMedia.com/watch?v=BCce_vbiwxE      Frequently asked questions:  1. What is Obstructive Sleep Apnea (SUE)? SUE is the most common type of sleep apnea. Apnea means, \"without breath.\"  Apnea is most often caused by narrowing or collapse of the upper airway as muscles relax during sleep.   Almost everyone has occasional apneas. Most people with sleep apnea have had brief interruptions at night frequently for many years.  The severity of sleep apnea is related to how frequent and severe the events are.   2. What are the consequences of SUE? Symptoms include: feeling sleepy during the day, snoring loudly, gasping or stopping of breathing, trouble sleeping, and occasionally morning headaches or heartburn at night.  Sleepiness can be serious and even increase the risk of falling asleep while driving. Other health consequences may include development of high blood pressure and other cardiovascular disease in persons who are susceptible. Untreated SUE  can contribute to heart disease, stroke and diabetes.   3. What are the treatment options? In most situations, sleep apnea is a lifelong disease that must be managed with daily therapy. Medications are not " effective for sleep apnea and surgery is generally not considered until other therapies have been tried. Your treatment is your choice . Continuous Positive Airway (CPAP) works right away and is the therapy that is effective in nearly everyone. An oral device to hold your jaw forward is usually the next most reliable option. Other options include postioning devices (to keep you off your back), weight loss, and surgery including a tongue pacing device. There is more detail about some of these options below.  4. Are my sleep studies covered by insurance? Although we will request verification of coverage, we advise you also check in advance of the study to ensure there is coverage.    Important tips for those choosing CPAP and similar devices  For new devices, sign up for device JOHANN to monitor your device for your followup visits  We encourage you to utilize the [employer] johann or website (myAir web (resmed.com) ) to monitor your therapy progress and share the data with your healthcare team when you discuss your sleep apnea.                                                    Know your equipment:  CPAP is continuous positive airway pressure that prevents obstructive sleep apnea by keeping the throat from collapsing while you are sleeping. In most cases, the device is  smart  and can slowly self-adjusts if your throat collapses and keeps a record every day of how well you are treated-this information is available to you and your care team.  BPAP is bilevel positive airway pressure that keeps your throat open and also assists each breath with a pressure boost to maintain adequate breathing.  Special kinds of BPAP are used in patients who have inadequate breathing from lung or heart disease. In most cases, the device is  smart  and can slowly self-adjusts to assist breathing. Like CPAP, the device keeps a record of how well you are treated.  Your mask is your connection to the device. You get to choose what feels most  comfortable and the staff will help to make sure if fits. Here: are some examples of the different masks that are available:       Key points to remember on your journey with sleep apnea:  Sleep study.  PAP devices often need to be adjusted during a sleep study to show that they are effective and adjusted right.  Good tips to remember: Try wearing just the mask during a quiet time during the day so your body adapts to wearing it. A humidifier is recommended for comfort in most cases to prevent drying of your nose and throat. Allergy medication from your provider may help you if you are having nasal congestion.  Getting settled-in. It takes more than one night for most of us to get used to wearing a mask. Try wearing just the mask during a quiet time during the day so your body adapts to wearing it. A humidifier is recommended for comfort in most cases. Our team will work with you carefully on the first day and will be in contact within 4 days and again at 2 and 4 weeks for advice and remote device adjustments. Your therapy is evaluated by the device each day.   Use it every night. The more you are able to sleep naturally for 7-8 hours, the more likely you will have good sleep and to prevent health risks or symptoms from sleep apnea. Even if you use it 4 hours it helps. Occasionally all of us are unable to use a medical therapy, in sleep apnea, it is not dangerous to miss one night.   Communicate. Call our skilled team on the number provided on the first day if your visit for problems that make it difficult to wear the device. Over 2 out of 3 patients can learn to wear the device long-term with help from our team. Remember to call our team or your sleep providers if you are unable to wear the device as we may have other solutions for those who cannot adapt to mask CPAP therapy. It is recommended that you sleep your sleep provider within the first 3 months and yearly after that if you are not having problems.   Use it  for your health. We encourage use of CPAP masks during daytime quiet periods to allow your face and brain to adapt to the sensation of CPAP so that it will be a more natural sensation to awaken to at night or during naps. This can be very useful during the first few weeks or months of adapting to CPAP though it does not help medically to wear CPAP during wakefulness and  should not be used as a strategy just to meet guidelines.  Take care of your equipment. Make sure you clean your mask and tubing using directions every day and that your filter and mask are replaced as recommended or if they are not working.     BESIDES CPAP, WHAT OTHER THERAPIES ARE THERE?    Positioning Device  Positioning devices are generally used when sleep apnea is mild and only occurs on your back.This example shows a pillow that straps around the waist. It may be appropriate for those whose sleep study shows milder sleep apnea that occurs primarily when lying flat on one's back. Preliminary studies have shown benefit but effectiveness at home may need to be verified by a home sleep test. These devices are generally not covered by medical insurance.  Examples of devices that maintain sleeping on the back to prevent snoring and mild sleep apnea.    Belt type body positioner  http://[employer].Innoverne/    Electronic reminder  http://nightshifttherapy.com/            Oral Appliance  What is oral appliance therapy?  An oral appliance device fits on your teeth at night like a retainer used after having braces. The device is made by a specialized dentist and requires several visits over 1-2 months before a manufactured device is made to fit your teeth and is adjusted to prevent your sleep apnea. Once an oral device is working properly, snoring should be improved. A home sleep test may be recommended at that time if to determine whether the sleep apnea is adequately treated.       Some things to remember:  -Oral devices are often, but not always, covered  by your medical insurance. Be sure to check with your insurance provider.   -If you are referred for oral therapy, you will be given a list of specialized dentists to consider or you may choose to visit the Web site of the American Academy of Dental Sleep Medicine  -Oral devices are less likely to work if you have severe sleep apnea or are extremely overweight.     More detailed information  An oral appliance is a small acrylic device that fits over the upper and lower teeth  (similar to a retainer or a mouth guard). This device slightly moves jaw forward, which moves the base of the tongue forward, opens the airway, improves breathing for effective treat snoring and obstructive sleep apnea in perhaps 7 out of 10 people .  The best working devices are custom-made by a dental device  after a mold is made of the teeth 1, 2, 3.  When is an oral appliance indicated?  Oral appliance therapy is recommended as a first-line treatment for patients with primary snoring, mild sleep apnea, and for patients with moderate sleep apnea who prefer appliance therapy to use of CPAP4, 5. Severity of sleep apnea is determined by sleep testing and is based on the number of respiratory events per hour of sleep.   How successful is oral appliance therapy?  The success rate of oral appliance therapy in patients with mild sleep apnea is 75-80% while in patients with moderate sleep apnea it is 50-70%. The chance of success in patients with severe sleep apnea is 40-50%. The research also shows that oral appliances have a beneficial effect on the cardiovascular health of SUE patients at the same magnitude as CPAP therapy7.  Oral appliances should be a second-line treatment in cases of severe sleep apnea, but if not completely successful then a combination therapy utilizing CPAP plus oral appliance therapy may be effective. Oral appliances tend to be effective in a broad range of patients although studies show that the patients who  have the highest success are females, younger patients, those with milder disease, and less severe obesity. 3, 6.   Finding a dentist that practices dental sleep medicine  Specific training is available through the American Academy of Dental Sleep Medicine for dentists interested in working in the field of sleep. To find a dentist who is educated in the field of sleep and the use of oral appliances, near you, visit the Web site of the American Academy of Dental Sleep Medicine.    References  1. Dania, et al. Objectively measured vs self-reported compliance during oral appliance therapy for sleep-disordered breathing. Chest 2013; 144(5): 9316-3935.  2. Jose, et al. Objective measurement of compliance during oral appliance therapy for sleep-disordered breathing. Thorax 2013; 68(1): 91-96.  3. Anaid et al. Mandibular advancement devices in 620 men and women with SUE and snoring: tolerability and predictors of treatment success. Chest 2004; 125: 3223-6000.  4. Ralph, et al. Oral appliances for snoring and SUE: a review. Sleep 2006; 29: 244-262.  5. Mikel et al. Oral appliance treatment for USE: an update. J Clin Sleep Med 2014; 10(2): 215-227.  6. Noreen et al. Predictors of OSAH treatment outcome. J Dent Res 2007; 86: 4822-5883.      Weight Loss:    Weight loss is a long-term strategy that may improve sleep apnea in some patients.    Weight management is a personal decision and the decision should be based on your interest and the potential benefits.  If you are interested in exploring weight loss strategies, the following discussion covers the impact on weight loss on sleep apnea and the approaches that may be successful.    Being overweight does not necessarily mean you will have health consequences.  Those who have BMI over 35 or over 27 with existing medical conditions carries greater risk.   Weight loss decreases severity of sleep apnea in most people with obesity. For those with mild  obesity who have developed snoring with weight gain, even 15-30 pound weight loss can improve and occasionally eliminate sleep apnea.  Structured and life-long dietary and health habits are necessary to lose weight and keep healthier weight levels.     Though there may be significant health benefits from weight loss, long-term weight loss is very difficult to achieve- studies show success with dietary management in less than 10% of people. In addition, substantial weight loss may require years of dietary control and may be difficult if patients have severe obesity. In these cases, surgical management may be considered.  Finally, older individuals who have tolerated obesity without health complications may be less likely to benefit from weight loss strategies.      [unfilled]    Surgery:    Surgery for obstructive sleep apnea is considered generally only when other therapies fail to work. Surgery may be discussed with you if you are having a difficult time tolerating CPAP and or when there is an abnormal structure that requires surgical correction.  Nose and throat surgeries often enlarge the airway to prevent collapse.  Most of these surgeries create pain for 1-2 weeks and up to half of the most common surgeries are not effective throughout life.  You should carefully discuss the benefits and drawbacks to surgery with your sleep provider and surgeon to determine if it is the best solution for you.   More information  Surgery for SUE is directed at areas that are responsible for narrowing or complete obstruction of the airway during sleep.  There are a wide range of procedures available to enlarge and/or stabilize the airway to prevent blockage of breathing in the three major areas where it can occur: the palate, tongue, and nasal regions.  Successful surgical treatment depends on the accurate identification of the factors responsible for obstructive sleep apnea in each person.  A personalized approach is required  because there is no single treatment that works well for everyone.  Because of anatomic variation, consultation with an examination by a sleep surgeon is a critical first step in determining what surgical options are best for each patient.  In some cases, examination during sedation may be recommended in order to guide the selection of procedures.  Patients will be counseled about risks and benefits as well as the typical recovery course after surgery. Surgery is typically not a cure for a person s SUE.  However, surgery will often significantly improve one s SUE severity (termed  success rate ).  Even in the absence of a cure, surgery will decrease the cardiovascular risk associated with OSA7; improve overall quality of life8 (sleepiness, functionality, sleep quality, etc).      Palate Procedures:  Patients with SUE often have narrowing of their airway in the region of their tonsils and uvula.  The goals of palate procedures are to widen the airway in this region as well as to help the tissues resist collapse.  Modern palate procedure techniques focus on tissue conservation and soft tissue rearrangement, rather than tissue removal.  Often the uvula is preserved in this procedure. Residual sleep apnea is common in patient after pharyngoplasty with an average reduction in sleep apnea events of 33%2.      Tongue Procedures:  ExamWhile patients are awake, the muscles that surround the throat are active and keep this region open for breathing. These muscles relax during sleep, allowing the tongue and other structures to collapse and block breathing.  There are several different tongue procedures available.  Selection of a tongue base procedure depends on characteristics seen on physical exam.  Generally, procedures are aimed at removing bulky tissues in this area or preventing the back of the tongue from falling back during sleep.  Success rates for tongue surgery range from 50-62%3.    Hypoglossal Nerve  Stimulation:  Hypoglossal nerve stimulation has recently received approval from the [place] Food and Drug Administration for the treatment of obstructive sleep apnea.  This is based on research showing that the system was safe and effective in treating sleep apnea6.  Results showed that the median AHI score decreased 68%, from 29.3 to 9.0. This therapy uses an implant system that senses breathing patterns and delivers mild stimulation to airway muscles, which keeps the airway open during sleep.  The system consists of three fully implanted components: a small generator (similar in size to a pacemaker), a breathing sensor, and a stimulation lead.  Using a small handheld remote, a patient turns the therapy on before bed and off upon awakening.    Candidates for this device must be greater than 18 years of age, have moderate to severe SUE (AHI between 15-65), BMI less than 35, have tried CPAP/oral appliance for at least 8 weeks without success, and have appropriate upper airway anatomy (determined by a sleep endoscopy performed by Dr. Sean Reyes).    Hypoglossal Nerve Stimulation Pathway:    The sleep surgeon s office will work with the patient through the insurance prior-authorization process (including communications and appeals).    Nasal Procedures:  Nasal obstruction can interfere with nasal breathing during the day and night.  Studies have shown that relief of nasal obstruction can improve the ability of some patients to tolerate positive airway pressure therapy for obstructive sleep apnea1.  Treatment options include medications such as nasal saline, topical corticosteroid and antihistamine sprays, and oral medications such as antihistamines or decongestants. Non-surgical treatments can include external nasal dilators for selected patients. If these are not successful by themselves, surgery can improve the nasal airway either alone or in combination with these other options.      Combination  Procedures:  Combination of surgical procedures and other treatments may be recommended, particularly if patients have more than one area of narrowing or persistent positional disease.  The success rate of combination surgery ranges from 66-80%2,3.    References  Radha BLANDON. The Role of the Nose in Snoring and Obstructive Sleep Apnoea: An Update.  Eur Arch Otorhinolaryngol. 2011; 268: 1365-73.   Steven SM; Davin JA; Orville JR; Pallanch JF; Shai MB; Tracy SG; Ian ART. Surgical modifications of the upper airway for obstructive sleep apnea in adults: a systematic review and meta-analysis. SLEEP 2010;33(10):1517-2936. Amanda EWING. Hypopharyngeal surgery in obstructive sleep apnea: an evidence-based medicine review.  Arch Otolaryngol Head Neck Surg. 2006 Feb;132(2):206-13.  Matt YH1, Amelia Y, Eliecer SHERRY. The efficacy of anatomically based multilevel surgery for obstructive sleep apnea. Otolaryngol Head Neck Surg. 2003 Oct;129(4):327-35.  Kezirian E, Goldberg A. Hypopharyngeal Surgery in Obstructive Sleep Apnea: An Evidence-Based Medicine Review. Arch Otolaryngol Head Neck Surg. 2006 Feb;132(2):206-13.  Kingston PJ et al. Upper-Airway Stimulation for Obstructive Sleep Apnea.  N Engl J Med. 2014 Jan 9;370(2):139-49.  Elisha Y et al. Increased Incidence of Cardiovascular Disease in Middle-aged Men with Obstructive Sleep Apnea. Am J Respir Crit Care Med; 2002 166: 159-165  Raphael EM et al. Studying Life Effects and Effectiveness of Palatopharyngoplasty (SLEEP) study: Subjective Outcomes of Isolated Uvulopalatopharyngoplasty. Otolaryngol Head Neck Surg. 2011; 144: 623-631.        WHAT IF I ONLY HAVE SNORING?    Mandibular advancement devices, lateral sleep positioning, long-term weight loss and treatment of nasal allergies have been shown to improve snoring.  Exercising tongue muscles with a game (https://apps.[employer]/us/johann/soundly-reduce-snoring/sl3105389829) or stimulating the tongue during the day with a device  (https://doi.org/10.3390/pko22525636) have improved snoring in some individuals.    Remember to Drive Safe... Drive Alive     Sleep health profoundly affects your health, mood, and your safety.  Thirty three percent of the population (one in three of us) is not getting enough sleep and many have a sleep disorder. Not getting enough sleep or having an untreated / undertreated sleep condition may make us sleepy without even knowing it. In fact, our driving could be dramatically impaired due to our sleep health. As your provider, here are some things I would like you to know about driving:     Here are some warning signs for impairment and dangerous drowsy driving:              -Having been awake more than 16 hours               -Looking tired               -Eyelid drooping              -Head nodding (it could be too late at this point)              -Driving for more than 30 minutes     Some things you could do to make the driving safer if you are experiencing some drowsiness:              -Stop driving and rest              -Call for transportation              -Make sure your sleep disorder is adequately treated     Some things that have been shown NOT to work when experiencing drowsiness while driving:              -Turning on the radio              -Opening windows              -Eating any  distracting  /  entertaining  foods (e.g., sunflower seeds, candy, or any other)              -Talking on the phone      Your decision may not only impact your life, but also the life of others. Please, remember to drive safe for yourself and all of us.

## 2023-06-07 DIAGNOSIS — Z79.4 TYPE 2 DIABETES MELLITUS WITH COMPLICATION, WITH LONG-TERM CURRENT USE OF INSULIN (H): ICD-10-CM

## 2023-06-07 DIAGNOSIS — E11.8 TYPE 2 DIABETES MELLITUS WITH COMPLICATION, WITH LONG-TERM CURRENT USE OF INSULIN (H): ICD-10-CM

## 2023-06-08 NOTE — TELEPHONE ENCOUNTER
SIMVASTATIN 40 MG TABLET      HISTORICAL ONLY ON MEDICATION LIST    Last Office Visit : 5-4-2023  Future Office visit:  NONE    Routing refill request to provider for review/approval because:  HISTORICAL ONLY ON MEDICATION LIST      Kathleen M Doege RN

## 2023-06-10 DIAGNOSIS — Z79.4 TYPE 2 DIABETES MELLITUS WITH COMPLICATION, WITH LONG-TERM CURRENT USE OF INSULIN (H): ICD-10-CM

## 2023-06-10 DIAGNOSIS — E11.8 TYPE 2 DIABETES MELLITUS WITH COMPLICATION, WITH LONG-TERM CURRENT USE OF INSULIN (H): ICD-10-CM

## 2023-06-10 RX ORDER — SIMVASTATIN 40 MG
TABLET ORAL
Qty: 90 TABLET | Refills: 1 | Status: SHIPPED | OUTPATIENT
Start: 2023-06-10 | End: 2023-10-18

## 2023-06-10 RX ORDER — SIMVASTATIN 40 MG
TABLET ORAL
Qty: 90 TABLET | Refills: 1 | Status: SHIPPED | OUTPATIENT
Start: 2023-06-10 | End: 2023-06-10

## 2023-06-28 ENCOUNTER — THERAPY VISIT (OUTPATIENT)
Dept: SLEEP MEDICINE | Facility: CLINIC | Age: 73
End: 2023-06-28
Payer: MEDICARE

## 2023-06-28 DIAGNOSIS — G47.30 SLEEP-RELATED BREATHING DISORDER: ICD-10-CM

## 2023-06-28 ASSESSMENT — SLEEP AND FATIGUE QUESTIONNAIRES
HOW LIKELY ARE YOU TO NOD OFF OR FALL ASLEEP WHILE SITTING QUIETLY AFTER LUNCH WITHOUT ALCOHOL: WOULD NEVER DOZE
HOW LIKELY ARE YOU TO NOD OFF OR FALL ASLEEP WHILE WATCHING TV: WOULD NEVER DOZE
HOW LIKELY ARE YOU TO NOD OFF OR FALL ASLEEP IN A CAR, WHILE STOPPED FOR A FEW MINUTES IN TRAFFIC: WOULD NEVER DOZE
HOW LIKELY ARE YOU TO NOD OFF OR FALL ASLEEP WHILE SITTING AND TALKING TO SOMEONE: WOULD NEVER DOZE
HOW LIKELY ARE YOU TO NOD OFF OR FALL ASLEEP WHILE SITTING AND READING: WOULD NEVER DOZE
HOW LIKELY ARE YOU TO NOD OFF OR FALL ASLEEP WHILE LYING DOWN TO REST IN THE AFTERNOON WHEN CIRCUMSTANCES PERMIT: SLIGHT CHANCE OF DOZING
HOW LIKELY ARE YOU TO NOD OFF OR FALL ASLEEP WHEN YOU ARE A PASSENGER IN A CAR FOR AN HOUR WITHOUT A BREAK: WOULD NEVER DOZE
HOW LIKELY ARE YOU TO NOD OFF OR FALL ASLEEP WHILE SITTING INACTIVE IN A PUBLIC PLACE: WOULD NEVER DOZE

## 2023-06-29 NOTE — PATIENT INSTRUCTIONS
[place] [facility]    1. Your sleep study will be reviewed by a sleep physician within the next few days.     2. Please follow up in the sleep clinic as scheduled, or, make an appointment with your sleep provider to be seen within two weeks to discuss the results of the sleep study.    3. If you have any questions or problems with your treatment plan, please contact your sleep clinic provider at [phone] to further manage your condition.    4. Please review your attached medication list, and, at your follow-up appointment advise your sleep clinic provider about any changes.    5. Go to [email or web address] for more information about your sleep problems.    Ciara Miranda  June 29, 2023

## 2023-06-29 NOTE — PROGRESS NOTES
Completed a split night PSG per provider order.    Preliminary AHI (1st study)  A final therapeutic PAP pressure was not achieved.    Supine REM was not seen on therapeutic pressure.    Patient reports feeling not refreshed in AM.

## 2023-06-30 ENCOUNTER — MYC MEDICAL ADVICE (OUTPATIENT)
Dept: FAMILY MEDICINE | Facility: CLINIC | Age: 73
End: 2023-06-30

## 2023-07-05 LAB — SLPCOMP: NORMAL

## 2023-07-28 ENCOUNTER — DOCUMENTATION ONLY (OUTPATIENT)
Dept: SLEEP MEDICINE | Facility: CLINIC | Age: 73
End: 2023-07-28
Payer: MEDICARE

## 2023-07-28 DIAGNOSIS — G47.33 OBSTRUCTIVE SLEEP APNEA (ADULT) (PEDIATRIC): Primary | ICD-10-CM

## 2023-07-28 NOTE — PROGRESS NOTES
Patient was offered choice of vendor and chose [facility].  Patient Mark Garces was set up at [place]  on July 28, 2023. Patient received a Resmed Airsense 10 Pressures were set at 7-20 cm H2O.   Patient s ramp is 5 cm H2O for Off and FLEX/EPR is EPR, 2.  Patient received a Resmed Mask name: Airfit F20  Full Face mask size Medium, heated tubing and heated humidifier.  Patient has the following compliance requirements: using and visit requirements  Patient has a follow up on TBD with Elizabeth Monroe

## 2023-08-01 ENCOUNTER — DOCUMENTATION ONLY (OUTPATIENT)
Dept: SLEEP MEDICINE | Facility: CLINIC | Age: 73
End: 2023-08-01
Payer: MEDICARE

## 2023-08-01 NOTE — TELEPHONE ENCOUNTER
Writer called patient and notified him he was due for an eye exam. Patient also due for annual wellness visit, writer assisted patient with scheduling this appointment.    KAN Quezada, RN  [facility]    Patient Quality Outreach    Patient is due for the following:   Diabetes -  Eye Exam  Physical Annual Wellness Visit    Next Steps:   Patient was scheduled for AWV with PCP 8/25/23  Patient will schedule eye exam at eye clinic    Type of outreach:    Phone, spoke to patient/parent.        Questions for provider review:    None           Ruth Moreno RN

## 2023-08-01 NOTE — PROGRESS NOTES
3 day Sleep therapy management telephone visit    Diagnostic AHI: 69.0 PSG    Confirmed with patient at time of call- N/A Patient is still interested in STM service       Message left for patient to return call.        Objective data     Order Settings for PAP  CPAP min     CPAP max              Device settings from machine CPAP min 7.0     CPAP max 20.0           EPR Setting TWO    RESMED soft response  OFF     Assessment: Nightly usage over four hours      Action plan: Patient to have 14 day STM visit. Patient has a follow up visit scheduled:   yes within 31-90 days of set up    Replacement device: No  STM ordered by provider: Yes     Total time spent on accessing and  interpreting remote patient PAP therapy data  10 minutes    Total time spent counseling, coaching  and reviewing PAP therapy data with patient  1 minutes    [id number] no

## 2023-08-15 ENCOUNTER — DOCUMENTATION ONLY (OUTPATIENT)
Dept: SLEEP MEDICINE | Facility: CLINIC | Age: 73
End: 2023-08-15
Payer: MEDICARE

## 2023-08-15 NOTE — PROGRESS NOTES
14  DAY STM VISIT    Diagnostic AHI: 69.0  PSG    Subjective measures:   Patient doing very well with CPAP, he is more rested.  Patient feeling way better and sleeping better.      Assessment: Pt meeting objective benchmarks.  Patient meeting subjective benchmarks.     Action plan: pt to have 30 day STM visit.      Device type: Auto-CPAP    PAP settings: CPAP min 7.0 cm  H20       CPAP max 20.0 cm  H20      95th% pressure 15.3 cm  H20        RESMED EPR level Setting: TWO    RESMED Soft response setting:  OFF    Mask type:  Full Face Mask    Objective measures: 14 day rolling measures      Compliance  85 %      Leak  17.84  lpm  last  upload      AHI 6.04   last  upload      Average number of minutes 327      Objective measure goal  Compliance   Goal >70%  Leak   Goal < 24 lpm  AHI  Goal < 5  Usage  Goal >240        Total time spent on accessing and interpreting remote patient PAP therapy data  10 minutes    Total time spent counseling, coaching  and reviewing PAP therapy data with patient  4 minutes    [id number]  [id number]  no (3 day STM)

## 2023-09-25 ENCOUNTER — LAB (OUTPATIENT)
Dept: LAB | Facility: CLINIC | Age: 73
End: 2023-09-25
Payer: MEDICARE

## 2023-09-25 DIAGNOSIS — Z79.4 TYPE 2 DIABETES MELLITUS WITH COMPLICATION, WITH LONG-TERM CURRENT USE OF INSULIN (H): ICD-10-CM

## 2023-09-25 DIAGNOSIS — E11.8 TYPE 2 DIABETES MELLITUS WITH COMPLICATION, WITH LONG-TERM CURRENT USE OF INSULIN (H): ICD-10-CM

## 2023-09-25 LAB
AST SERPL W P-5'-P-CCNC: 16 U/L (ref 0–45)
CHOLEST SERPL-MCNC: 169 MG/DL
CREAT SERPL-MCNC: 1.28 MG/DL (ref 0.67–1.17)
CREAT UR-MCNC: 70.4 MG/DL
EGFRCR SERPLBLD CKD-EPI 2021: 60 ML/MIN/1.73M2
HDLC SERPL-MCNC: 58 MG/DL
LDLC SERPL CALC-MCNC: 95 MG/DL
MICROALBUMIN UR-MCNC: <12 MG/L
MICROALBUMIN/CREAT UR: NORMAL MG/G{CREAT}
NONHDLC SERPL-MCNC: 111 MG/DL
TRIGL SERPL-MCNC: 80 MG/DL
TSH SERPL DL<=0.005 MIU/L-ACNC: 2.78 UIU/ML (ref 0.3–4.2)
VIT B12 SERPL-MCNC: 354 PG/ML (ref 232–1245)

## 2023-09-25 PROCEDURE — 84450 TRANSFERASE (AST) (SGOT): CPT

## 2023-09-25 PROCEDURE — 82607 VITAMIN B-12: CPT

## 2023-09-25 PROCEDURE — 82570 ASSAY OF URINE CREATININE: CPT

## 2023-09-25 PROCEDURE — 80061 LIPID PANEL: CPT

## 2023-09-25 PROCEDURE — 82565 ASSAY OF CREATININE: CPT

## 2023-09-25 PROCEDURE — 84443 ASSAY THYROID STIM HORMONE: CPT

## 2023-09-25 PROCEDURE — 82043 UR ALBUMIN QUANTITATIVE: CPT

## 2023-09-25 PROCEDURE — 36415 COLL VENOUS BLD VENIPUNCTURE: CPT

## 2023-09-27 NOTE — PROGRESS NOTES
Outcome for 09/27/23 4:09 PM: [employer] message sent  Ruth Sanchez LPN   Outcome for 09/28/23 9:28 AM: Patient is not checking blood sugars  Lou Trevizo MA    Patient is showing 5/5 [place] met.   Lou Trevizo MA

## 2023-10-05 ENCOUNTER — VIRTUAL VISIT (OUTPATIENT)
Dept: ENDOCRINOLOGY | Facility: CLINIC | Age: 73
End: 2023-10-05
Payer: MEDICARE

## 2023-10-05 DIAGNOSIS — Z79.4 TYPE 2 DIABETES MELLITUS WITH COMPLICATION, WITH LONG-TERM CURRENT USE OF INSULIN (H): Primary | ICD-10-CM

## 2023-10-05 DIAGNOSIS — E11.8 TYPE 2 DIABETES MELLITUS WITH COMPLICATION, WITH LONG-TERM CURRENT USE OF INSULIN (H): Primary | ICD-10-CM

## 2023-10-05 PROCEDURE — 99214 OFFICE O/P EST MOD 30 MIN: CPT | Mod: VID | Performed by: PHYSICIAN ASSISTANT

## 2023-10-05 ASSESSMENT — PAIN SCALES - GENERAL: PAINLEVEL: NO PAIN (0)

## 2023-10-05 NOTE — PROGRESS NOTES
Virtual Visit Details    Type of service:  Video Visit   Video Start Time:   Video End Time:    Originating Location (pt. Location):   Distant Location (provider location):    Platform used for Video Visit:

## 2023-10-05 NOTE — LETTER
10/5/2023       RE: Mark Garces  [address] [facility] [address]     Dear Colleague,    Thank you for referring your patient, Mark Garces, to the [facility] [place] [facility]. Please see a copy of my visit note below.    Outcome for 09/27/23 4:09 PM: [employer] message sent  Ruth Sanchez LPN   Outcome for 09/28/23 9:28 AM: Patient is not checking blood sugars  Lou Trevizo MA    Patient is showing 5/5 [place]CM met.   Lou Trevizo MA         Virtual Visit Details    Type of service:  Video Visit   Video Start Time:   Video End Time:    Originating Location (pt. Location):   Distant Location (provider location):    Platform used for Video Visit:     Time of start: 12:03 pm  Time of end: 12:15 pm   Total duration of video visit: 12 minutes.  Providers location: offsite.  Patients location: [place].    [facility]   Mark Garces is a 72 year old male with type 2 diabetes mellitus. Video visit for diabetes follow up.  Pt's hx is significant for type 2 DM dx in 2007, obesity, hyperlipidemia, COVID19 infection Nov 2020, SUE and past hx of Hepatitis C which has been treated successfully.  He was seen by Oph in Nov 2021 and was told he has diabetic retinopathy.  He also has a hx of microalbuminuria.  No sx of diabetic neuropathy.  For his diabetes, he is currently taking Metformin 500 mg 2 tabs po BID, Jardiance 25 mg daily and Januvia 100 mg daily.  Most recent A1C was 7.3 % on 4/27/2023 and previous A1C was  8.3 % on 1/12/2023.  Mark has not been checking his blood sugar.  Pt states he is not checking his blood sugar at home because he does not like to stick himself with the lancets. He was told his insurance will not cover a Freestyle Ama sensor or CGM.  On ROS today, he reports feeling better overall. Using his sleep apnea equipment.  Pt denies blurred vision, n/v, SOB at rest, fever, chills or chest pain.  No abd pain, dysuria,  hematuria or foot ulcers.   Some numbness and pain intermittently in this right leg. He has hx chronic back issues.   Intermittent loose stools.  He denies any numbness, tingling or pain in his left foot/leg.  No sx of groin yeast infection.    DIABETES CARE:  Retinopathy: yes; seen by Oph in Nov 2021 at [facility] in [place] and he was told he has diabetic retinopathy. Reminded him to schedule a diabetic eye exam.  Nephropathy: urine microalbuminuria + in 6/2022 and negative in 9/2023.   Pt taking Lisinopril.  Neuropathy: no diabetic neuropathy.  Foot exam: no exam today.  SUE: yes- using equipment.  Taking ASA: yes.  Lipids: LDL 95 in 9/2023. Pt taking Simvastatin.  Insulin: none.   DM meds: Metformin, Jardiance and Januvia.  Testing: not testing.   Freestyle Ama sensor/CGM not covered by insurance.    ROS   Please see under HPI.     ALLERGIES:   No Known Allergies      Prescription Medications as of 10/5/2023         Rx Number Disp Refills Start End Last Dispensed Date Next Fill Date Owning Pharmacy    aspirin 81 MG tablet            Sig: Take 1 tablet by mouth daily.    Class: Historical    Route: Oral    blood glucose (NO BRAND SPECIFIED) test strip  250 strip 4 6/22/2022    [facility]/pharmacy [address] [place] [address]    Sig: Use to test blood sugar twice daily.  ONE TOUCH ULTRA STRIPS    Class: E-Prescribe    empagliflozin (JARDIANCE) 25 MG TABS tablet  90 tablet 3 5/4/2023    [facility]/pharmacy [address] [place] [address]    Sig: Take 1 tablet (25 mg) by mouth daily    Class: E-Prescribe    Route: Oral    lisinopril (ZESTRIL) 10 MG tablet  90 tablet 3 1/19/2023    [facility]/pharmacy [address] [place] [address]    Sig: Take 1 tablet (10 mg) by mouth daily    Class: E-Prescribe    Route: Oral    metFORMIN (GLUCOPHAGE) 500 MG tablet  360 tablet 0 5/4/2023    [facility]/pharmacy #[address] [place] [address]    Sig: TAKE 2 TABLETS BY MOUTH TWICE A DAY.    Class: E-Prescribe     Omega-3 Fatty Acids (FISH OIL) 500 MG CAPS            Sig: Take 2 capsules by mouth daily.    Class: Historical    Route: Oral    simvastatin (ZOCOR) 40 MG tablet  90 tablet 1 6/10/2023    [facility]/pharmacy #[address] [place] [address]    Sig: Take 1 tab daily.    Class: E-Prescribe    sitagliptin (JANUVIA) 100 MG tablet  90 tablet 3 1/19/2023    [facility]/pharmacy [address] [place] [address]    Sig: Take 1 tablet (100 mg) by mouth daily    Class: E-Prescribe    Route: Oral    zolpidem (AMBIEN) 10 MG tablet  1 tablet 0 6/5/2023    [facility]/pharmacy [address] [place] [address]    Sig: Take tablet by mouth 15 minutes prior to sleep, for Sleep Study    Class: E-Prescribe          Family Hx   No family hx of diabetes.    Personal Hx   Smoke: None at this time.  ETOH : None at this time.    PMH   1. Type 2 Diabetes Mellitus dx in 2007.   2. Hepatitis C; pt underwent tx for Hep C and his Hep C has cleared.  3. Obesity.  4. Past hx of nicotine use.   5. Hx of undescended testis s/p surgery in 1971.   6. Anemia while undergoing hep C treatment.  7. Retinopathy.  8. Microalbuminuria.  9. Hyperlipidemia.      Physical Exam   No exam today.    Results   Creatinine   Date Value Ref Range Status   09/25/2023 1.28 (H) 0.67 - 1.17 mg/dL Final   09/01/2020 0.90 0.66 - 1.25 mg/dL Final     GFR Estimate   Date Value Ref Range Status   09/25/2023 60 (L) >60 mL/min/1.73m2 Final   09/01/2020 86 >60 mL/min/[1.73_m2] Final     Comment:     Non  GFR Calc  Starting 12/18/2018, serum creatinine based estimated GFR (eGFR) will be   calculated using the Chronic Kidney Disease Epidemiology Collaboration   (CKD-EPI) equation.       Hemoglobin A1C   Date Value Ref Range Status   04/27/2023 7.3 (H) 0.0 - 5.6 % Final     Comment:     Normal <5.7%   Prediabetes 5.7-6.4%    Diabetes 6.5% or higher     Note: Adopted from ADA consensus guidelines.   09/01/2020 7.3 (H) 0 - 5.6 % Final     Comment:     Normal <5.7%  Prediabetes 5.7-6.4%  Diabetes 6.5% or higher - adopted from ADA   consensus guidelines.       Potassium   Date Value Ref Range Status   06/10/2022 4.5 3.5 - 5.0 mmol/L Final   04/09/2019 4.0 3.4 - 5.3 mmol/L Final     ALT   Date Value Ref Range Status   11/15/2021 12 0 - 45 U/L Final   09/01/2020 18 0 - 70 U/L Final     AST   Date Value Ref Range Status   09/25/2023 16 0 - 45 U/L Final     Comment:     Reference intervals for this test were updated on 6/12/2023 to more accurately reflect our healthy population. There may be differences in the flagging of prior results with similar values performed with this method. Interpretation of those prior results can be made in the context of the updated reference intervals.   09/01/2020 7 0 - 45 U/L Final     TSH   Date Value Ref Range Status   09/25/2023 2.78 0.30 - 4.20 uIU/mL Final   11/15/2021 2.95 0.30 - 5.00 uIU/mL Final   09/01/2020 2.85 0.40 - 4.00 mU/L Final     T4 Free   Date Value Ref Range Status   09/07/2017 1.02 0.76 - 1.46 ng/dL Final         Cholesterol   Date Value Ref Range Status   09/25/2023 169 <200 mg/dL Final   01/12/2023 164 <200 mg/dL Final   09/01/2020 146 <200 mg/dL Final   04/09/2019 152 <200 mg/dL Final     HDL Cholesterol   Date Value Ref Range Status   09/01/2020 53 >39 mg/dL Final   04/09/2019 55 >39 mg/dL Final     Direct Measure HDL   Date Value Ref Range Status   09/25/2023 58 >=40 mg/dL Final   01/12/2023 51 >=40 mg/dL Final     LDL Cholesterol Calculated   Date Value Ref Range Status   09/25/2023 95 <=100 mg/dL Final   01/12/2023 89 <=100 mg/dL Final   09/01/2020 79 <100 mg/dL Final     Comment:     Desirable:       <100 mg/dl   04/09/2019 77 <100 mg/dL Final     Comment:     Desirable:       <100 mg/dl     Triglycerides   Date Value Ref Range Status   09/25/2023 80 <150 mg/dL Final   01/12/2023 118 <150 mg/dL Final   09/01/2020 71 <150 mg/dL Final   04/09/2019 96 <150 mg/dL Final     Cholesterol/HDL Ratio   Date Value Ref Range Status    08/20/2014 2.8 0.0 - 5.0 Final   11/19/2013 3.7 0.0 - 5.0 Final       ASSESSMENT/PLAN:     1. TYPE 2 DIABETES MELLITUS: Patient's A1C improved with addition of Jardiance.  Continue Jardiance 25 mg each am, Januvia 100 mg daily and Metformin 1000 mg BID.  Mark would like to avoid injectable drugs.   He has been working on  making healthy food choices.   I have no blood sugar readings since Mark does not check his blood sugar.  Freestyle Ama sensor/CGM is not covered by his insurance.  Again, I reviewed the recent studies on Jardiance- cardiovascular and renal benefits of this drug, how the drug works and possible side effects of the medication including dehydration, UTIs and groin yeast infection.  Reminded him to drink plenty of water.  Pt's creat was 1.28 with GFR 60  mL/min on 9/25/2023. Will recheck.  He denies sx of diabetic neuropathy or foot ulcers.    2.  RETINOPATHY: Seen by Oph 11/18/2021 and was told he has diabetic retinopathy.  Reminded him to see Oph annually.    3.  MICROALBUMINURIA: Urine microalbuminuria +.  Pt is taking Lisinopril.    4.  HYPERLIPIDEMIA: LDL 95, HDL 58 and TG 80 in 9/2023.  Pt is taking Simvastatin and fish oil.    5. OVERWEIGHT: Encouraged pt to make healthy food choices.    6. FOLLOW UP: With me in 6 months.  A1C and creat/GFR ordered today.    Time spent reviewing chart and labs today = 5 minutes.  Time for video visit today=  12 minutes.  Time for documentation today = 15 minutes.    TOTAL TIME FOR VISIT TODAY = 32 minutes.    Elizabeth Guzman PA-C            Answers submitted by the patient for this visit:  Symptoms you have experienced in the last 30 days (Submitted on 10/5/2023)  General Symptoms: No  Skin Symptoms: No  HENT Symptoms: No  EYE SYMPTOMS: No  HEART SYMPTOMS: No  LUNG SYMPTOMS: No  INTESTINAL SYMPTOMS: No  URINARY SYMPTOMS: No  REPRODUCTIVE SYMPTOMS: No  SKELETAL SYMPTOMS: No  BLOOD SYMPTOMS: No  NERVOUS SYSTEM SYMPTOMS: No  MENTAL HEALTH SYMPTOMS: No

## 2023-10-05 NOTE — NURSING NOTE
Is the patient currently in the state of [place]? YES    Visit mode:VIDEO    If the visit is dropped, the patient can be reconnected by: VIDEO VISIT: Send to e-mail at: [email or web address]    Will anyone else be joining the visit? NO  (If patient encounters technical issues they should call [phone]    How would you like to obtain your AVS? MyChart    Are changes needed to the allergy or medication list? No    Reason for visit: RECHECK (Type 2 DM)    Shelby Kocher VVF

## 2023-10-05 NOTE — PROGRESS NOTES
Time of start: 12:03 pm  Time of end: 12:15 pm   Total duration of video visit: 12 minutes.  Providers location: offsite.  Patients location: [place].    [facility] [place]   Mark Garces is a 72 year old male with type 2 diabetes mellitus. Video visit for diabetes follow up.  Pt's hx is significant for type 2 DM dx in 2007, obesity, hyperlipidemia, COVID19 infection Nov 2020, SUE and past hx of Hepatitis C which has been treated successfully.  He was seen by Oph in Nov 2021 and was told he has diabetic retinopathy.  He also has a hx of microalbuminuria.  No sx of diabetic neuropathy.  For his diabetes, he is currently taking Metformin 500 mg 2 tabs po BID, Jardiance 25 mg daily and Januvia 100 mg daily.  Most recent A1C was 7.3 % on 4/27/2023 and previous A1C was  8.3 % on 1/12/2023.  Mark has not been checking his blood sugar.  Pt states he is not checking his blood sugar at home because he does not like to stick himself with the lancets. He was told his insurance will not cover a Freestyle Ama sensor or CGM.  On ROS today, he reports feeling better overall. Using his sleep apnea equipment.  Pt denies blurred vision, n/v, SOB at rest, fever, chills or chest pain.  No abd pain, dysuria, hematuria or foot ulcers.   Some numbness and pain intermittently in this right leg. He has hx chronic back issues.   Intermittent loose stools.  He denies any numbness, tingling or pain in his left foot/leg.  No sx of groin yeast infection.    DIABETES CARE:  Retinopathy: yes; seen by Oph in Nov 2021 at [facility] and he was told he has diabetic retinopathy. Reminded him to schedule a diabetic eye exam.  Nephropathy: urine microalbuminuria + in 6/2022 and negative in 9/2023.   Pt taking Lisinopril.  Neuropathy: no diabetic neuropathy.  Foot exam: no exam today.  SUE: yes- using equipment.  Taking ASA: yes.  Lipids: LDL 95 in 9/2023. Pt taking Simvastatin.  Insulin: none.   DM meds: Metformin, Jardiance and Januvia.  Testing: not  testing.   Freestyle Ama sensor/CGM not covered by insurance.    ROS   Please see under HPI.     ALLERGIES:   No Known Allergies      Prescription Medications as of 10/5/2023         Rx Number Disp Refills Start End Last Dispensed Date Next Fill Date Owning Pharmacy    aspirin 81 MG tablet            Sig: Take 1 tablet by mouth daily.    Class: Historical    Route: Oral    blood glucose (NO BRAND SPECIFIED) test strip  250 strip 4 6/22/2022    [facility]/pharmacy #[address] [place] [address]    Sig: Use to test blood sugar twice daily.  ONE TOUCH ULTRA STRIPS    Class: E-Prescribe    empagliflozin (JARDIANCE) 25 MG TABS tablet  90 tablet 3 5/4/2023    [facility]/pharmacy [address] [place] [address]    Sig: Take 1 tablet (25 mg) by mouth daily    Class: E-Prescribe    Route: Oral    lisinopril (ZESTRIL) 10 MG tablet  90 tablet 3 1/19/2023    [facility]/pharmacy #[address] [place] [address]    Sig: Take 1 tablet (10 mg) by mouth daily    Class: E-Prescribe    Route: Oral    metFORMIN (GLUCOPHAGE) 500 MG tablet  360 tablet 0 5/4/2023    [facility]/pharmacy #[address] [place] [address]    Sig: TAKE 2 TABLETS BY MOUTH TWICE A DAY.    Class: E-Prescribe    Omega-3 Fatty Acids (FISH OIL) 500 MG CAPS            Sig: Take 2 capsules by mouth daily.    Class: Historical    Route: Oral    simvastatin (ZOCOR) 40 MG tablet  90 tablet 1 6/10/2023    [facility]/pharmacy #[address] [place] [address]    Sig: Take 1 tab daily.    Class: E-Prescribe    sitagliptin (JANUVIA) 100 MG tablet  90 tablet 3 1/19/2023    [facility]/pharmacy #[address] [place] [address]    Sig: Take 1 tablet (100 mg) by mouth daily    Class: E-Prescribe    Route: Oral    zolpidem (AMBIEN) 10 MG tablet  1 tablet 0 6/5/2023    [facility]/pharmacy #[address] [place] [address]    Sig: Take tablet by mouth 15 minutes prior to sleep, for Sleep Study    Class: E-Prescribe          Family Hx   No family hx of diabetes.    Personal  Hx   Smoke: None at this time.  ETOH : None at this time.    PMH   1. Type 2 Diabetes Mellitus dx in 2007.   2. Hepatitis C; pt underwent tx for Hep C and his Hep C has cleared.  3. Obesity.  4. Past hx of nicotine use.   5. Hx of undescended testis s/p surgery in 1971.   6. Anemia while undergoing hep C treatment.  7. Retinopathy.  8. Microalbuminuria.  9. Hyperlipidemia.      Physical Exam   No exam today.    Results   Creatinine   Date Value Ref Range Status   09/25/2023 1.28 (H) 0.67 - 1.17 mg/dL Final   09/01/2020 0.90 0.66 - 1.25 mg/dL Final     GFR Estimate   Date Value Ref Range Status   09/25/2023 60 (L) >60 mL/min/1.73m2 Final   09/01/2020 86 >60 mL/min/[1.73_m2] Final     Comment:     Non  GFR Calc  Starting 12/18/2018, serum creatinine based estimated GFR (eGFR) will be   calculated using the Chronic Kidney Disease Epidemiology Collaboration   (CKD-EPI) equation.       Hemoglobin A1C   Date Value Ref Range Status   04/27/2023 7.3 (H) 0.0 - 5.6 % Final     Comment:     Normal <5.7%   Prediabetes 5.7-6.4%    Diabetes 6.5% or higher     Note: Adopted from ADA consensus guidelines.   09/01/2020 7.3 (H) 0 - 5.6 % Final     Comment:     Normal <5.7% Prediabetes 5.7-6.4%  Diabetes 6.5% or higher - adopted from ADA   consensus guidelines.       Potassium   Date Value Ref Range Status   06/10/2022 4.5 3.5 - 5.0 mmol/L Final   04/09/2019 4.0 3.4 - 5.3 mmol/L Final     ALT   Date Value Ref Range Status   11/15/2021 12 0 - 45 U/L Final   09/01/2020 18 0 - 70 U/L Final     AST   Date Value Ref Range Status   09/25/2023 16 0 - 45 U/L Final     Comment:     Reference intervals for this test were updated on 6/12/2023 to more accurately reflect our healthy population. There may be differences in the flagging of prior results with similar values performed with this method. Interpretation of those prior results can be made in the context of the updated reference intervals.   09/01/2020 7 0 - 45 U/L Final      TSH   Date Value Ref Range Status   09/25/2023 2.78 0.30 - 4.20 uIU/mL Final   11/15/2021 2.95 0.30 - 5.00 uIU/mL Final   09/01/2020 2.85 0.40 - 4.00 mU/L Final     T4 Free   Date Value Ref Range Status   09/07/2017 1.02 0.76 - 1.46 ng/dL Final         Cholesterol   Date Value Ref Range Status   09/25/2023 169 <200 mg/dL Final   01/12/2023 164 <200 mg/dL Final   09/01/2020 146 <200 mg/dL Final   04/09/2019 152 <200 mg/dL Final     HDL Cholesterol   Date Value Ref Range Status   09/01/2020 53 >39 mg/dL Final   04/09/2019 55 >39 mg/dL Final     Direct Measure HDL   Date Value Ref Range Status   09/25/2023 58 >=40 mg/dL Final   01/12/2023 51 >=40 mg/dL Final     LDL Cholesterol Calculated   Date Value Ref Range Status   09/25/2023 95 <=100 mg/dL Final   01/12/2023 89 <=100 mg/dL Final   09/01/2020 79 <100 mg/dL Final     Comment:     Desirable:       <100 mg/dl   04/09/2019 77 <100 mg/dL Final     Comment:     Desirable:       <100 mg/dl     Triglycerides   Date Value Ref Range Status   09/25/2023 80 <150 mg/dL Final   01/12/2023 118 <150 mg/dL Final   09/01/2020 71 <150 mg/dL Final   04/09/2019 96 <150 mg/dL Final     Cholesterol/HDL Ratio   Date Value Ref Range Status   08/20/2014 2.8 0.0 - 5.0 Final   11/19/2013 3.7 0.0 - 5.0 Final       ASSESSMENT/PLAN:     1. TYPE 2 DIABETES MELLITUS: Patient's A1C improved with addition of Jardiance.  Continue Jardiance 25 mg each am, Januvia 100 mg daily and Metformin 1000 mg BID.  Mark would like to avoid injectable drugs.   He has been working on  making healthy food choices.   I have no blood sugar readings since Mark does not check his blood sugar.  Freestyle Ama sensor/CGM is not covered by his insurance.  Again, I reviewed the recent studies on Jardiance- cardiovascular and renal benefits of this drug, how the drug works and possible side effects of the medication including dehydration, UTIs and groin yeast infection.  Reminded him to drink plenty of water.  Pt's  creat was 1.28 with GFR 60  mL/min on 9/25/2023. Will recheck.  He denies sx of diabetic neuropathy or foot ulcers.    2.  RETINOPATHY: Seen by Oph 11/18/2021 and was told he has diabetic retinopathy.  Reminded him to see Oph annually.    3.  MICROALBUMINURIA: Urine microalbuminuria +.  Pt is taking Lisinopril.    4.  HYPERLIPIDEMIA: LDL 95, HDL 58 and TG 80 in 9/2023.  Pt is taking Simvastatin and fish oil.    5. OVERWEIGHT: Encouraged pt to make healthy food choices.    6. FOLLOW UP: With me in 6 months.  A1C and creat/GFR ordered today.    Time spent reviewing chart and labs today = 5 minutes.  Time for video visit today=  12 minutes.  Time for documentation today = 15 minutes.    TOTAL TIME FOR VISIT TODAY = 32 minutes.    Elizabeth Guzman PA-C            Answers submitted by the patient for this visit:  Symptoms you have experienced in the last 30 days (Submitted on 10/5/2023)  General Symptoms: No  Skin Symptoms: No  HENT Symptoms: No  EYE SYMPTOMS: No  HEART SYMPTOMS: No  LUNG SYMPTOMS: No  INTESTINAL SYMPTOMS: No  URINARY SYMPTOMS: No  REPRODUCTIVE SYMPTOMS: No  SKELETAL SYMPTOMS: No  BLOOD SYMPTOMS: No  NERVOUS SYSTEM SYMPTOMS: No  MENTAL HEALTH SYMPTOMS: No

## 2023-10-11 ENCOUNTER — TELEPHONE (OUTPATIENT)
Dept: ENDOCRINOLOGY | Facility: CLINIC | Age: 73
End: 2023-10-11
Payer: MEDICARE

## 2023-10-11 NOTE — TELEPHONE ENCOUNTER
"Contact patient to schedule labs and a 6 month follow up with Elizabeth KEE.    Patient was annoyed and irritated with the call asked why the call- if the appointment is not till 6 months from now. Stated \"How! important is this appointment that you have to call me for when I'm busy\". Stated this is something he could schedule via My Chart. Pt stated to be busy and hang up.    Rachel Estrada on 10/11/2023 at 3:22 PM        "

## 2023-10-12 ASSESSMENT — ENCOUNTER SYMPTOMS
MYALGIAS: 0
WEAKNESS: 0
PALPITATIONS: 0
ABDOMINAL PAIN: 0
HEMATOCHEZIA: 0
DYSURIA: 0
DIARRHEA: 0
SORE THROAT: 0
FREQUENCY: 1
NERVOUS/ANXIOUS: 0
CONSTIPATION: 0
HEMATURIA: 0
ARTHRALGIAS: 0
EYE PAIN: 0
FEVER: 0
CHILLS: 0
COUGH: 0
JOINT SWELLING: 0
SHORTNESS OF BREATH: 0
PARESTHESIAS: 0
DIZZINESS: 0
HEARTBURN: 0
HEADACHES: 0
NAUSEA: 0

## 2023-10-12 ASSESSMENT — ACTIVITIES OF DAILY LIVING (ADL): CURRENT_FUNCTION: NO ASSISTANCE NEEDED

## 2023-10-13 PROBLEM — G47.30 SLEEP APNEA: Status: ACTIVE | Noted: 2023-08-25

## 2023-10-17 ENCOUNTER — OFFICE VISIT (OUTPATIENT)
Dept: FAMILY MEDICINE | Facility: CLINIC | Age: 73
End: 2023-10-17
Payer: MEDICARE

## 2023-10-17 ENCOUNTER — LAB (OUTPATIENT)
Dept: LAB | Facility: CLINIC | Age: 73
End: 2023-10-17
Payer: MEDICARE

## 2023-10-17 VITALS
DIASTOLIC BLOOD PRESSURE: 64 MMHG | BODY MASS INDEX: 34.79 KG/M2 | RESPIRATION RATE: 18 BRPM | SYSTOLIC BLOOD PRESSURE: 124 MMHG | WEIGHT: 248.5 LBS | HEIGHT: 71 IN | TEMPERATURE: 98.6 F | HEART RATE: 77 BPM | OXYGEN SATURATION: 98 %

## 2023-10-17 DIAGNOSIS — Z86.19 HISTORY OF HEPATITIS C: ICD-10-CM

## 2023-10-17 DIAGNOSIS — E11.8 TYPE 2 DIABETES MELLITUS WITH COMPLICATION, WITH LONG-TERM CURRENT USE OF INSULIN (H): ICD-10-CM

## 2023-10-17 DIAGNOSIS — Z00.00 MEDICARE ANNUAL WELLNESS VISIT, SUBSEQUENT: Primary | ICD-10-CM

## 2023-10-17 DIAGNOSIS — G47.33 OSA (OBSTRUCTIVE SLEEP APNEA): ICD-10-CM

## 2023-10-17 DIAGNOSIS — E66.811 CLASS 1 OBESITY WITH SERIOUS COMORBIDITY AND BODY MASS INDEX (BMI) OF 34.0 TO 34.9 IN ADULT, UNSPECIFIED OBESITY TYPE: ICD-10-CM

## 2023-10-17 DIAGNOSIS — Z79.4 TYPE 2 DIABETES MELLITUS WITH COMPLICATION, WITH LONG-TERM CURRENT USE OF INSULIN (H): ICD-10-CM

## 2023-10-17 DIAGNOSIS — E11.69 TYPE 2 DIABETES MELLITUS WITH OTHER SPECIFIED COMPLICATION, WITHOUT LONG-TERM CURRENT USE OF INSULIN (H): ICD-10-CM

## 2023-10-17 LAB
ALBUMIN SERPL BCG-MCNC: 4.4 G/DL (ref 3.5–5.2)
ALP SERPL-CCNC: 67 U/L (ref 40–129)
ALT SERPL W P-5'-P-CCNC: 12 U/L (ref 0–70)
ANION GAP SERPL CALCULATED.3IONS-SCNC: 10 MMOL/L (ref 7–15)
AST SERPL W P-5'-P-CCNC: 15 U/L (ref 0–45)
BILIRUB SERPL-MCNC: 0.3 MG/DL
BUN SERPL-MCNC: 27.8 MG/DL (ref 8–23)
CALCIUM SERPL-MCNC: 10 MG/DL (ref 8.8–10.2)
CHLORIDE SERPL-SCNC: 103 MMOL/L (ref 98–107)
CREAT SERPL-MCNC: 1.05 MG/DL (ref 0.67–1.17)
DEPRECATED HCO3 PLAS-SCNC: 22 MMOL/L (ref 22–29)
EGFRCR SERPLBLD CKD-EPI 2021: 76 ML/MIN/1.73M2
GLUCOSE SERPL-MCNC: 189 MG/DL (ref 70–99)
HBA1C MFR BLD: 7.1 % (ref 0–5.6)
POTASSIUM SERPL-SCNC: 5.6 MMOL/L (ref 3.4–5.3)
PROT SERPL-MCNC: 7 G/DL (ref 6.4–8.3)
SODIUM SERPL-SCNC: 135 MMOL/L (ref 135–145)

## 2023-10-17 PROCEDURE — 83036 HEMOGLOBIN GLYCOSYLATED A1C: CPT

## 2023-10-17 PROCEDURE — 80053 COMPREHEN METABOLIC PANEL: CPT | Performed by: FAMILY MEDICINE

## 2023-10-17 PROCEDURE — G0439 PPPS, SUBSEQ VISIT: HCPCS | Performed by: FAMILY MEDICINE

## 2023-10-17 PROCEDURE — 36415 COLL VENOUS BLD VENIPUNCTURE: CPT | Performed by: FAMILY MEDICINE

## 2023-10-17 PROCEDURE — 91320 SARSCV2 VAC 30MCG TRS-SUC IM: CPT | Performed by: FAMILY MEDICINE

## 2023-10-17 PROCEDURE — 90480 ADMN SARSCOV2 VAC 1/ONLY CMP: CPT | Performed by: FAMILY MEDICINE

## 2023-10-17 ASSESSMENT — ACTIVITIES OF DAILY LIVING (ADL)
TOILETING_ISSUES: NO
DOING_ERRANDS_INDEPENDENTLY_DIFFICULTY: NO
DIFFICULTY_COMMUNICATING: NO
CHANGE_IN_FUNCTIONAL_STATUS_SINCE_ONSET_OF_CURRENT_ILLNESS/INJURY: NO
WEAR_GLASSES_OR_BLIND: YES
HEARING_DIFFICULTY_OR_DEAF: NO
DRESSING/BATHING_DIFFICULTY: NO
DIFFICULTY_EATING/SWALLOWING: NO
WALKING_OR_CLIMBING_STAIRS_DIFFICULTY: NO
FALL_HISTORY_WITHIN_LAST_SIX_MONTHS: NO
CONCENTRATING,_REMEMBERING_OR_MAKING_DECISIONS_DIFFICULTY: NO

## 2023-10-17 ASSESSMENT — PAIN SCALES - GENERAL: PAINLEVEL: NO PAIN (0)

## 2023-10-17 NOTE — PROGRESS NOTES
SUBJECTIVE:   Mark is a 72 year old who presents for Preventive Visit.      He has type II diabetes he follows with endocrinology resume disorder. Reviewed recent note. He is due for an A1c because it was not obtained with other recent labs for an unknown reason. His A1c is shown improvement. We discussed briefly his medications. We discussed the benefits of his current regimen. He will continue to follow with dermatology.    He is on appropriate medications for overall vascular disease risk reduction. He does not have any signs or symptoms of vascular disease.    He was recently diagnosed with obstructive sleep apnea, he and I discussed some rather significant ongoing symptoms and it turns out he has rather significant sleep apnea in his life is changed by his report after initiating CPAP treatment.    He is a history of hepatitis C that has been treated.    He continues to have some issues with not. Although it is much less the previous even with using guardians at this point. He does note more urinary frequency during the day but this seems to be expected. He is dealing with his symptoms and does not have any significant concerns.    Talked about other aspects of routine health prevention including immunizations today.    He does have some easy bruising on the forearms, this is likely a combination of factors are taking a daily aspirin as well as just having more fragile skin he does note that his father had the same problem. Notification make any changes overall provided reassurance.    Are you in the first 12 months of your Medicare coverage?  No    Have you ever done Advance Care Planning? (For example, a Health Directive, POLST, or a discussion with a medical provider or your loved ones about your wishes): Yes, advance care planning is on file.    Fallen 2 or more times in the past year?: No  Any fall with injury in the past year?: No    Cognitive Screening   1) Repeat 3 items : Performed  2) Clock draw:  "NORMAL  3) 3 item recall: Recalls 2 objects   Results: NORMAL clock, 1-2 items recalled: COGNITIVE IMPAIRMENT LESS LIKELY    Mini-CogTM Copyright GENE Earl. Licensed by the author for use in [facility]; reprinted with permission (adalgisa@[facility]). All rights reserved.      Do you have sleep apnea, excessive snoring or daytime drowsiness? : yes    Reviewed and updated as needed this visit by clinical staff  Answers submitted by the patient for this visit:  Annual Preventive Visit (Submitted on 10/12/2023)  Chief Complaint: Annual Exam:  In general, how would you rate your overall physical health?: fair  Frequency of exercise:: None  Do you usually eat at least 4 servings of fruit and vegetables a day, include whole grains & fiber, and avoid regularly eating high fat or \"junk\" foods? : Yes  Taking medications regularly:: Yes  Medication side effects:: Lightheadedness  Activities of Daily Living: no assistance needed  Home safety: no safety concerns identified  Hearing Impairment:: no hearing concerns  In the past 6 months, have you been bothered by leaking of urine?: No  In general, how would you rate your overall mental or emotional health?: fair  Additional concerns today:: No    Reviewed and updated as needed this visit by Provider            10/12/2023    11:07 AM   Alcohol Use   Prescreen: >3 drinks/day or >7 drinks/week? Not Applicable          No data to display              Do you have a current opioid prescription? No  Do you use any other controlled substances or medications that are not prescribed by a provider? None        Current providers sharing in care for this patient include:   Patient Care Team:  Eloy Anne MD as PCP - General (Family Medicine)  Benny Townsend MD as MD (Gastroenterology)  Elizabeth Guzman PA-C as Physician Assistant (Physician Assistant)  Elizabeth Guzman PA-C as Assigned Endocrinology Provider  Eloy Anne MD as Assigned PCP    The following health " "maintenance items are reviewed in Epic and correct as of today:  Health Maintenance   Topic Date Due    DTAP/TDAP/TD IMMUNIZATION (1 - Tdap) Never done    ZOSTER IMMUNIZATION (1 of 2) Never done    LUNG CANCER SCREENING  Never done    HEPATITIS B IMMUNIZATION (1 of 3 - Risk 3-dose series) Never done    RSV VACCINE 60+ (1 - 1-dose 60+ series) Never done    Pneumococcal Vaccine: 65+ Years (2 - PCV) 11/27/2013    AORTIC ANEURYSM SCREENING (SYSTEM ASSIGNED)  Never done    DIABETIC FOOT EXAM  04/09/2020    EYE EXAM  11/18/2022    MEDICARE ANNUAL WELLNESS VISIT  11/30/2022    BMP  06/10/2023    ANNUAL REVIEW OF HM ORDERS  06/10/2023    INFLUENZA VACCINE (1) 09/01/2023    A1C  04/17/2024    LIPID  09/25/2024    MICROALBUMIN  09/25/2024    FALL RISK ASSESSMENT  10/17/2024    ADVANCE CARE PLANNING  10/17/2028    COLORECTAL CANCER SCREENING  03/01/2032    PHQ-2 (once per calendar year)  Completed    COVID-19 Vaccine  Completed    IPV IMMUNIZATION  Aged Out    HPV IMMUNIZATION  Aged Out    MENINGITIS IMMUNIZATION  Aged Out               Review of Systems  Complete review of systems is obtained.  Other than the specific considerations noted above complete review of systems is negative.      OBJECTIVE:   /64   Pulse 77   Temp 98.6  F (37  C)   Resp 18   Ht 1.803 m (5' 11\")   Wt 112.7 kg (248 lb 8 oz)   SpO2 98%   BMI 34.66 kg/m   Estimated body mass index is 34.66 kg/m  as calculated from the following:    Height as of this encounter: 1.803 m (5' 11\").    Weight as of this encounter: 112.7 kg (248 lb 8 oz).  Physical Exam          General Appearance:    Alert, cooperative, no distress   Eyes:   No scleral icterus or conjunctival irritation       Ears:    Normal TM's and external ear canals, both ears   Throat:   Lips, mucosa, and tongue normal; teeth and gums normal   Neck:   Supple, symmetrical, trachea midline, no adenopathy;        thyroid:  No enlargement/tenderness/nodules   Lungs:     Clear to auscultation " "bilaterally, respirations unlabored, no wheezes or crackles   Heart:    Regular rate and rhythm,  No murmur   Abdomen:    Soft, no distention, no tenderness on palpation, no masses, no organomegaly     Extremities:  No edema, no joint swelling or redness, no evidence of any injuries   Skin:  No concerning skin findings, no suspicious moles, no rashes   Neurologic:  On gross examination there is no motor or sensory deficit.  Patient walks with a normal gait               ASSESSMENT / PLAN:   Mark was seen today for recheck medication and wellness visit.    Diagnoses and all orders for this visit:    Medicare annual wellness visit, subsequent    Type 2 diabetes mellitus with other specified complication, without long-term current use of insulin (H)  -     Comprehensive metabolic panel (BMP + Alb, Alk Phos, ALT, AST, Total. Bili, TP)    History of hepatitis C    Class 1 obesity with serious comorbidity and body mass index (BMI) of 34.0 to 34.9 in adult, unspecified obesity type    Other orders  -     COVID-19 12+ (2023-24) (PFIZER)           Patient has been advised of split billing requirements and indicates understanding: Yes      COUNSELING:  Reviewed preventive health counseling, as reflected in patient instructions       Regular exercise       Healthy diet/nutrition       Vision screening       Dental care       Colon cancer screening       Prostate cancer screening      BMI:   Estimated body mass index is 34.66 kg/m  as calculated from the following:    Height as of this encounter: 1.803 m (5' 11\").    Weight as of this encounter: 112.7 kg (248 lb 8 oz).   Weight management plan: Discussed healthy diet and exercise guidelines      He reports that he quit smoking about 11 years ago. His smoking use included cigarettes. He has never used smokeless tobacco.      Appropriate preventive services were discussed with this patient, including applicable screening as appropriate for fall prevention, nutrition, physical " activity, Tobacco-use cessation, weight loss and cognition.  Checklist reviewing preventive services available has been given to the patient.    Reviewed patients plan of care and provided an AVS. The Basic Care Plan (routine screening as documented in Health Maintenance) for Mark meets the Care Plan requirement. This Care Plan has been established and reviewed with the Patient.        Eloy Anne MD, MD  [facility]    Identified Health Risks:

## 2023-10-18 DIAGNOSIS — E11.8 TYPE 2 DIABETES MELLITUS WITH COMPLICATION, WITH LONG-TERM CURRENT USE OF INSULIN (H): ICD-10-CM

## 2023-10-18 DIAGNOSIS — Z79.4 TYPE 2 DIABETES MELLITUS WITH COMPLICATION, WITH LONG-TERM CURRENT USE OF INSULIN (H): ICD-10-CM

## 2023-10-18 DIAGNOSIS — E11.65 TYPE 2 DIABETES MELLITUS WITH HYPERGLYCEMIA, WITHOUT LONG-TERM CURRENT USE OF INSULIN (H): ICD-10-CM

## 2023-10-18 DIAGNOSIS — E11.9 DM (DIABETES MELLITUS), TYPE 2 (H): ICD-10-CM

## 2023-10-18 RX ORDER — LISINOPRIL 10 MG/1
10 TABLET ORAL DAILY
Qty: 90 TABLET | Refills: 0 | Status: SHIPPED | OUTPATIENT
Start: 2023-10-18 | End: 2024-01-04

## 2023-10-18 RX ORDER — SIMVASTATIN 40 MG
TABLET ORAL
Qty: 90 TABLET | Refills: 0 | Status: SHIPPED | OUTPATIENT
Start: 2023-10-18 | End: 2024-05-19

## 2023-10-18 NOTE — TELEPHONE ENCOUNTER
Message from Endo clinic:  on my chart:    please send all  prescriptions information   Mark GALLOWAY [facility] Pool22 hours ago (3:45 PM)  Topic: Non-Medical Question.     [facility] - [place], [place] - [address]  [address]  [facility] [id number] [phone]    Pharm transfer: remaining refill sent  simvastatin (ZOCOR) 40 MG tablet   90 tablet 1 6/10/2023  No  Sig: Take 1 tab daily.      empagliflozin (JARDIANCE) 25 MG TABS tablet   90 tablet 3 5/4/2023  No  Sig - Route: Take 1 tablet (25 mg) by mouth daily - Oral  Prescribing Provider's NPI: [id number]  Elizabeth Guzman    sitagliptin (JANUVIA) 100 MG tablet 90 tablet 3 1/19/2023  --  Sig - Route: Take 1 tablet (100 mg) by mouth daily - Oral    lisinopril (ZESTRIL) 10 MG tablet 90 tablet 3 1/19/2023  --  Sig - Route: Take 1 tablet (10 mg) by mouth daily - Oral    all aboved rx by Prescribing Provider's NPI: [id number]  Elizabeth Guzman          metFORMIN (GLUCOPHAGE) 500 MG tablet   Last Written Prescription Date:5-4-23  Last Fill Quantity:90   # refills: 0  blood glucose (NO BRAND SPECIFIED) test strip  Last Written Prescription Date:  6-22-22  Last Fill Quantity: 250 strip,   # refills: 4  Last Office Visit : 10-5-23  Future Office visit:  3-13-23   90 day:1

## 2023-10-19 DIAGNOSIS — E87.5 HYPERKALEMIA: Primary | ICD-10-CM

## 2023-10-26 ASSESSMENT — SLEEP AND FATIGUE QUESTIONNAIRES
HOW LIKELY ARE YOU TO NOD OFF OR FALL ASLEEP WHILE SITTING INACTIVE IN A PUBLIC PLACE: WOULD NEVER DOZE
HOW LIKELY ARE YOU TO NOD OFF OR FALL ASLEEP WHEN YOU ARE A PASSENGER IN A CAR FOR AN HOUR WITHOUT A BREAK: WOULD NEVER DOZE
HOW LIKELY ARE YOU TO NOD OFF OR FALL ASLEEP WHILE SITTING AND READING: WOULD NEVER DOZE
HOW LIKELY ARE YOU TO NOD OFF OR FALL ASLEEP WHILE WATCHING TV: SLIGHT CHANCE OF DOZING
HOW LIKELY ARE YOU TO NOD OFF OR FALL ASLEEP WHILE LYING DOWN TO REST IN THE AFTERNOON WHEN CIRCUMSTANCES PERMIT: WOULD NEVER DOZE
HOW LIKELY ARE YOU TO NOD OFF OR FALL ASLEEP WHILE SITTING AND TALKING TO SOMEONE: WOULD NEVER DOZE
HOW LIKELY ARE YOU TO NOD OFF OR FALL ASLEEP WHILE SITTING QUIETLY AFTER LUNCH WITHOUT ALCOHOL: WOULD NEVER DOZE
HOW LIKELY ARE YOU TO NOD OFF OR FALL ASLEEP IN A CAR, WHILE STOPPED FOR A FEW MINUTES IN TRAFFIC: WOULD NEVER DOZE

## 2023-10-27 ENCOUNTER — OFFICE VISIT (OUTPATIENT)
Dept: SLEEP MEDICINE | Facility: CLINIC | Age: 73
End: 2023-10-27
Payer: MEDICARE

## 2023-10-27 VITALS
OXYGEN SATURATION: 97 % | HEART RATE: 65 BPM | WEIGHT: 244.06 LBS | DIASTOLIC BLOOD PRESSURE: 77 MMHG | BODY MASS INDEX: 34.04 KG/M2 | SYSTOLIC BLOOD PRESSURE: 136 MMHG

## 2023-10-27 DIAGNOSIS — G47.33 OSA (OBSTRUCTIVE SLEEP APNEA): ICD-10-CM

## 2023-10-27 DIAGNOSIS — G47.33 OBSTRUCTIVE SLEEP APNEA (ADULT) (PEDIATRIC): Primary | ICD-10-CM

## 2023-10-27 PROCEDURE — 99214 OFFICE O/P EST MOD 30 MIN: CPT | Performed by: NURSE PRACTITIONER

## 2023-10-27 NOTE — NURSING NOTE
Return in 6 months has been scheduled. Patient declined AVS.     Kenya Meyer MARLIN  [facility]

## 2023-10-27 NOTE — PROGRESS NOTES
Obstructive Sleep Apnea - PAP Follow-Up Visit:    Chief Complaint   Patient presents with    CPAP Follow Up       Mark Garces comes in today for follow-up of their severe sleep apnea, managed with CPAP.     Mark has a past medical history notable for type 2 diabetes mellitus diagnosed in 2007, obesity, class II, hyperlipidemia, COVID 19 infection in November, 2020, past history of hepatitis C which has been treated successfully, lumbar back pain, cervicalgia, anemia.  Patient has a past history of cigarette smoking.  He has not ever smoked any drugs of abuse.  He does not drink alcohol     Do you use a CPAP Machine at home: Yes  Overall, on a scale of 0-10 how would you rate your CPAP (0 poor, 10 great): 8  Is your mask comfortable: No  If not, why: tight, will order a mask refitting  Is you mask leaking: No  If yes, where do you feel it:    How many night per week does the mask leak (0-7):    Do you notice snoring with mask on: No  Do you notice gasping arousals with mask on: No  Are you having significant oral or nasal dryness: No  Is the pressure setting comfortable: Yes  In not, why:    What type of mask do you use: Full Face Mask  What is your typical bedtime: 12am  How long does it take you to go to sleep on PAP therapy: 10-20 minutes  What time do you typically get out of bed for the day: 7am  How many hours on average per night are you using PAP therapy: 7  How many hours are you sleeping per night: 7  Do you feel well rested in the morning: Yes    EPWORTH SLEEPINESS SCALE         10/26/2023    11:26 AM    [place] Sleepiness Scale ( ESPINOZA Alatorre  [id number]<br>ESS - [place]/English - Final version - 21 Nov 07 - [facility] [place].)   Sitting and reading Would never doze   Watching TV Slight chance of dozing   Sitting, inactive in a public place (e.g. a theatre or a meeting) Would never doze   As a passenger in a car for an hour without a break Would never doze   Lying down to rest in the afternoon when  circumstances permit Would never doze   Sitting and talking to someone Would never doze   Sitting quietly after a lunch without alcohol Would never doze   In a car, while stopped for a few minutes in traffic Would never doze   [place] Score (MC) 1   [place] Score (Sleep) 1       INSOMNIA SEVERITY INDEX (KALPANA)          10/26/2023    11:21 AM   Insomnia Severity Index (KALPANA)   Difficulty falling asleep 0   Difficulty staying asleep 0   Problems waking up too early 1   How SATISFIED/DISSATISFIED are you with your CURRENT sleep pattern? 0   How NOTICEABLE to others do you think your sleep problem is in terms of impairing the quality of your life? 0   How WORRIED/DISTRESSED are you about your current sleep problem? 0   To what extent do you consider your sleep problem to INTERFERE with your daily functioning (e.g. daytime fatigue, mood, ability to function at work/daily chores, concentration, memory, mood, etc.) CURRENTLY? 0   KALPANA Total Score 1       Guidelines for Scoring/Interpretation:  Total score categories:  0-7 = No clinically significant insomnia   8-14 = Subthreshold insomnia   15-21 = Clinical insomnia (moderate severity)  22-28 = Clinical insomnia (severe)  Used via courtesy of www.UrG[employer].va.gov with permission from Joao Whitehead PhD., [facility]    ResMed   Auto-PAP 7.0 - 20.0 cmH2O 30 day usage data:    100% of days with > 4 hours of use. 0/30 days with no use.   Average use 426 minutes per day.   95%ile Leak 10.99 L/min.   CPAP 95% pressure 17.7 cm.   AHI 8.95 events per hour.     Previous Sleep Studies  Patient underwent a split-night sleep study on the evening of 6/28/2023 at [place] [facility]  Relevant medical history included morbid obesity, low back pain and cervicalgia.  A diagnostic polysomnogram was performed to evaluate for sleep apnea.  After 123 minutes of sleep time the patient exhibited sufficient respiratory events qualifying for CPAP trial which was then  initiated    AHI, combined: 69 events/hour  REM AHI:-  AHI, supine: 113.1 events/hour  RDI: 73.8 events/hour  SaO2, baseline: 90.6%  SaO2, elaine: 83.0%  Time spent less than/equal to 88%: 31.1 minutes  Time spent less than/equal to 89%: 50.5 minutes    Patient underwent CPAP titration from a pressure of 5-7 CWP.  Optimal pressure was not obtained.  The titration was considered: Unacceptable    6/28/2023 [place] Split Sleep Study (247.0 lbs) - AHI 69.0, RDI 73.8, Supine .1, REM AHI -, Low O2% 83.0%, Time Spent ?88% 31.1, Time Spent ?89% 50.5. Treatment was titrated to a pressure of CPAP 6 with an AHI -. Time spent in REM supine at this pressure was - minutes      DME provider: [facility] [employer], patient uses ResMed AirSense 10 with pressure set 7-20 cm H2O.  Patient uses AirFit F20 fullface mask, size medium    Reviewed by team:  Tobacco  Allergies  Meds  Problems           Reviewed by provider:   Allergies  Meds  Problems             Problem List:  Patient Active Problem List    Diagnosis Date Noted    Sleep apnea 08/25/2023     Priority: Medium    Morbid obesity (H) 11/30/2021     Priority: Medium    Dyslipidemia 11/29/2021     Priority: Medium     Formatting of this note might be different from the original.  Created by Conversion      Cervicalgia 02/18/2015     Priority: Medium    Anemia 08/29/2012     Priority: Medium    DM (diabetes mellitus), type 2 (H) 04/24/2012     Priority: Medium    Obesity 04/24/2012     Priority: Medium     Problem list name updated by automated process. Provider to review            /77   Pulse 65   Wt 110.7 kg (244 lb 1 oz)   SpO2 97%   BMI 34.04 kg/m      Memory foam mattress      Impression/Plan:  Assessment:   ? The patient s sleep architecture was fragmented with increased arousal index.   ? The patient was found to have severe obstructive sleep apnea with an apnea hypopnea index of 69 events per hour with the lowest O2 Sat of 83%.   ? The  patient was also found to have sleep related hypoxia.   ? Patient underwent CPAP titration from a pressure of 5-7 cwp.   ? Optimal pressure was not obtained.   ? Patient was found to have borderline periodic limb movement in sleep.   Recommendations:   ? Treatment of SUE with Auto?titrating PAP therapy with a range of 7 cmH2O to 20 cmH2O. Recommend clinical follow up with sleep management team, including review of compliance measures.   ? Advice regarding the risks of drowsy driving.   ? Suggest optimizing sleep schedule and avoiding sleep deprivation.   ? Weight management (if BMI > 30).     Discussed with patient that he should use his CPAP equipment minimum of 4 hours each day, 70% of the time.  Optimally he is encouraged to use it 100% of the sleep time in order to gain maximum benefits  Recommend patient optimize his sleep schedule as well as his sleep hygiene practices to mitigate any future sleep disturbance  Recommend patient employ safe driving skills such as not driving a motor vehicle should he become drowsy    30 minutes spent with patient, all of which were spent face-to-face counseling, consulting, coordinating plan of care.      SPIKE Murphy CNP  Sleep Medicine    This note was written with the assistance of the Dragon voice-dictation technology software. The final document, although reviewed, may contain errors. For corrections, please contact the office.      CC:  Eloy Anne,

## 2023-10-27 NOTE — NURSING NOTE
"Chief Complaint   Patient presents with    CPAP Follow Up       Initial /77   Pulse 65   Wt 110.7 kg (244 lb 1 oz)   SpO2 97%   BMI 34.04 kg/m   Estimated body mass index is 34.04 kg/m  as calculated from the following:    Height as of 10/17/23: 1.803 m (5' 11\").    Weight as of this encounter: 110.7 kg (244 lb 1 oz).    Medication Reconciliation: complete    Neck circumference:     DME: PALMIRA Weaver  [facility] [place]      "

## 2023-11-15 DIAGNOSIS — E11.65 TYPE 2 DIABETES MELLITUS WITH HYPERGLYCEMIA, WITHOUT LONG-TERM CURRENT USE OF INSULIN (H): ICD-10-CM

## 2023-11-20 NOTE — TELEPHONE ENCOUNTER
sitagliptin (JANUVIA) 100 MG tablet     DPP4 Inhibitors Protocol Passed     Elizabeth Guzman PA-C  Endocrinology, Diabetes, and Metabolism    10/5/2023  [facility] [place]

## 2023-11-24 DIAGNOSIS — E11.65 TYPE 2 DIABETES MELLITUS WITH HYPERGLYCEMIA, WITHOUT LONG-TERM CURRENT USE OF INSULIN (H): ICD-10-CM

## 2023-12-28 DIAGNOSIS — E11.65 TYPE 2 DIABETES MELLITUS WITH HYPERGLYCEMIA, WITHOUT LONG-TERM CURRENT USE OF INSULIN (H): ICD-10-CM

## 2024-01-04 RX ORDER — LISINOPRIL 10 MG/1
10 TABLET ORAL DAILY
Qty: 90 TABLET | Refills: 1 | Status: SHIPPED | OUTPATIENT
Start: 2024-01-04 | End: 2024-05-19

## 2024-01-04 NOTE — TELEPHONE ENCOUNTER
Lisinopril 10mg Tablet      Last Written Prescription Date:  10/18/23  Last Fill Quantity: 90,   # refills: 0  Last Office Visit : 10/5/23  Future Office visit:  3/13/24    Routing refill request to provider for review/approval because:  Abnormal potassium  Potassium   Date Value Ref Range Status   10/17/2023 5.6 (H) 3.4 - 5.3 mmol/L Final   06/10/2022 4.5 3.5 - 5.0 mmol/L Final   04/09/2019 4.0 3.4 - 5.3 mmol/L Final

## 2024-02-29 ENCOUNTER — MYC MEDICAL ADVICE (OUTPATIENT)
Dept: FAMILY MEDICINE | Facility: CLINIC | Age: 74
End: 2024-02-29

## 2024-03-05 NOTE — PROGRESS NOTES
Outcome for 03/05/24 11:04 AM: Respectance message sent  Lou Trevizo MA  Outcome for 03/06/24 1:00 PM: Patient is not checking blood sugars  Ruth Sanchez LPN     Patient is showing 5/5 MNCM met.   Ruth Sanchez LPN      Virtual Visit Details    Type of service:  Video Visit   Video Start Time:   Video End Time:    Originating Location (pt. Location):     Distant Location (provider location):    Platform used for Video Visit:

## 2024-03-13 ENCOUNTER — VIRTUAL VISIT (OUTPATIENT)
Dept: ENDOCRINOLOGY | Facility: CLINIC | Age: 74
End: 2024-03-13
Payer: MEDICARE

## 2024-03-13 VITALS — WEIGHT: 240 LBS | BODY MASS INDEX: 33.47 KG/M2

## 2024-03-13 DIAGNOSIS — E11.65 TYPE 2 DIABETES MELLITUS WITH HYPERGLYCEMIA, WITHOUT LONG-TERM CURRENT USE OF INSULIN (H): Primary | ICD-10-CM

## 2024-03-13 PROCEDURE — 99214 OFFICE O/P EST MOD 30 MIN: CPT | Mod: 95 | Performed by: PHYSICIAN ASSISTANT

## 2024-03-13 NOTE — PROGRESS NOTES
Time of start: 9:01 am   Time of end: 9:13 am   Total duration of video visit:  12 minutes.  Providers location: Off-site  Patients location: [facility]   Mark Garces is a 73 year old male with type 2 diabetes mellitus. Video visit for diabetes follow up.  Pt's hx is significant for type 2 DM dx in 2007, obesity, hyperlipidemia, COVID19 infection Nov 2020, SUE and past hx of Hepatitis C which has been treated successfully.  He was seen by Oph in Nov 2021 and was told he has diabetic retinopathy.  He also has a hx of microalbuminuria.  No sx of diabetic neuropathy.  For his diabetes, he is currently taking Metformin 500 mg 2 tabs po BID, Jardiance 25 mg daily and Januvia 100 mg daily.  Mark is tolerating all his diabetic meds well.  Most recent A1C was 7.1% on October 17, 2023.    Previous A1C was 7.3 % on 4/27/2023 and A1C was  8.3 % on 1/12/2023.  Mark has not been checking his blood sugar.  Pt states he is not checking his blood sugar at home because he does not like to stick himself with the lancets. He was told his insurance will not cover a Freestyle Ama sensor or CGM.  On ROS today, he reports feeling better overall. Using his sleep apnea equipment.  Pt denies blurred vision, n/v, SOB at rest, fever, chills or chest pain.  No abd pain, dysuria, hematuria or foot ulcers.   Intermittent loose stools.  He denies any numbness, tingling or pain in his feet.  Denies foot ulcers.  No sx of groin yeast infection.    DIABETES CARE:  Retinopathy: yes; seen by Oph in Nov 2021 at [facility] and he was told he has diabetic retinopathy.  He tells me he plans to schedule a diabetic eye exam.  Nephropathy: urine microalbuminuria + in 6/2022 and negative in 9/2023.   Pt taking Lisinopril.  Neuropathy: no diabetic neuropathy.  Foot exam: no exam today.  SUE: yes- using equipment.  Taking ASA: yes.  Lipids: LDL 95 in 9/2023. Pt taking Simvastatin.  Insulin: none.   DM meds: Metformin, Jardiance and  Januvia.  Testing: not testing.   Freestyle Ama sensor/CGM not covered by insurance.    ROS   Please see under HPI.     ALLERGIES:   No Known Allergies      Prescription Medications as of 3/13/2024         Rx Number Disp Refills Start End Last Dispensed Date Next Fill Date Owning Pharmacy    aspirin 81 MG tablet  -- --  --       Sig: Take 1 tablet by mouth daily.    Class: Historical    Route: Oral    empagliflozin (JARDIANCE) 25 MG TABS tablet  90 tablet 1 10/18/2023 --   [place], [place] - [address]    Sig: Take 1 tablet (25 mg) by mouth daily    Class: E-Prescribe    Route: Oral    lisinopril (ZESTRIL) 10 MG tablet  90 tablet 1 1/4/2024 --   [place], [place] - [address]    Sig: Take 1 tablet (10 mg) by mouth daily    Class: E-Prescribe    Route: Oral    metFORMIN (GLUCOPHAGE) 500 MG tablet  360 tablet 1 10/18/2023 --   [place], [place] - [address]    Sig: TAKE 2 TABLETS BY MOUTH TWICE A DAY.    Class: E-Prescribe    Omega-3 Fatty Acids (FISH OIL) 500 MG CAPS  -- --  --       Sig: Take 2 capsules by mouth daily.    Class: Historical    Route: Oral    simvastatin (ZOCOR) 40 MG tablet  90 tablet 0 10/18/2023 --   [place], [place] - [address]    Sig: Take 1 tab daily.    Class: E-Prescribe    sitagliptin (JANUVIA) 100 MG tablet  90 tablet 2 11/20/2023 --   [place] - [address]    Sig: Take 1 tablet (100 mg) by mouth daily - Oral    Class: E-Prescribe    blood glucose (NO BRAND SPECIFIED) test strip  200 strip 1 10/18/2023 --   [place], [place] - [address]    Sig: Use to test blood sugar twice daily.  ONE TOUCH ULTRA STRIPS    Class: E-Prescribe          Family Hx   No family hx of diabetes.    Personal Hx   Smoke: None at this  time.  ETOH : None at this time.    PMH   1. Type 2 Diabetes Mellitus dx in 2007.   2. Hepatitis C; pt underwent tx for Hep C and his Hep C has cleared.  3. Obesity.  4. Past hx of nicotine use.   5. Hx of undescended testis s/p surgery in 1971.   6. Anemia while undergoing hep C treatment.  7. Retinopathy.  8. Microalbuminuria.  9. Hyperlipidemia.  10.  Sleep apnea.    Physical Exam   No exam today.    Results   Creatinine   Date Value Ref Range Status   10/17/2023 1.05 0.67 - 1.17 mg/dL Final   09/01/2020 0.90 0.66 - 1.25 mg/dL Final     GFR Estimate   Date Value Ref Range Status   10/17/2023 76 >60 mL/min/1.73m2 Final   09/01/2020 86 >60 mL/min/[1.73_m2] Final     Comment:     Non  GFR Calc  Starting 12/18/2018, serum creatinine based estimated GFR (eGFR) will be   calculated using the Chronic Kidney Disease Epidemiology Collaboration   (CKD-EPI) equation.       Hemoglobin A1C   Date Value Ref Range Status   10/17/2023 7.1 (H) 0.0 - 5.6 % Final     Comment:     Normal <5.7%   Prediabetes 5.7-6.4%    Diabetes 6.5% or higher     Note: Adopted from ADA consensus guidelines.   09/01/2020 7.3 (H) 0 - 5.6 % Final     Comment:     Normal <5.7% Prediabetes 5.7-6.4%  Diabetes 6.5% or higher - adopted from ADA   consensus guidelines.       Potassium   Date Value Ref Range Status   10/17/2023 5.6 (H) 3.4 - 5.3 mmol/L Final   06/10/2022 4.5 3.5 - 5.0 mmol/L Final   04/09/2019 4.0 3.4 - 5.3 mmol/L Final     ALT   Date Value Ref Range Status   10/17/2023 12 0 - 70 U/L Final     Comment:     Reference intervals for this test were updated on 6/12/2023 to more accurately reflect our healthy population. There may be differences in the flagging of prior results with similar values performed with this method. Interpretation of those prior results can be made in the context of the updated reference intervals.     09/01/2020 18 0 - 70 U/L Final     AST   Date Value Ref Range Status   10/17/2023 15 0 - 45 U/L Final      Comment:     Reference intervals for this test were updated on 6/12/2023 to more accurately reflect our healthy population. There may be differences in the flagging of prior results with similar values performed with this method. Interpretation of those prior results can be made in the context of the updated reference intervals.   09/01/2020 7 0 - 45 U/L Final     TSH   Date Value Ref Range Status   09/25/2023 2.78 0.30 - 4.20 uIU/mL Final   11/15/2021 2.95 0.30 - 5.00 uIU/mL Final   09/01/2020 2.85 0.40 - 4.00 mU/L Final     T4 Free   Date Value Ref Range Status   09/07/2017 1.02 0.76 - 1.46 ng/dL Final         Cholesterol   Date Value Ref Range Status   09/25/2023 169 <200 mg/dL Final   01/12/2023 164 <200 mg/dL Final   09/01/2020 146 <200 mg/dL Final   04/09/2019 152 <200 mg/dL Final     HDL Cholesterol   Date Value Ref Range Status   09/01/2020 53 >39 mg/dL Final   04/09/2019 55 >39 mg/dL Final     Direct Measure HDL   Date Value Ref Range Status   09/25/2023 58 >=40 mg/dL Final   01/12/2023 51 >=40 mg/dL Final     LDL Cholesterol Calculated   Date Value Ref Range Status   09/25/2023 95 <=100 mg/dL Final   01/12/2023 89 <=100 mg/dL Final   09/01/2020 79 <100 mg/dL Final     Comment:     Desirable:       <100 mg/dl   04/09/2019 77 <100 mg/dL Final     Comment:     Desirable:       <100 mg/dl     Triglycerides   Date Value Ref Range Status   09/25/2023 80 <150 mg/dL Final   01/12/2023 118 <150 mg/dL Final   09/01/2020 71 <150 mg/dL Final   04/09/2019 96 <150 mg/dL Final     Cholesterol/HDL Ratio   Date Value Ref Range Status   08/20/2014 2.8 0.0 - 5.0 Final   11/19/2013 3.7 0.0 - 5.0 Final       ASSESSMENT/PLAN:     1. TYPE 2 DIABETES MELLITUS: Mark continues to tolerate all his diabetic meds.  A1C ordered today.  Continue Jardiance 25 mg each am, Januvia 100 mg daily and Metformin 1000 mg BID.  Mark would like to avoid injectable drugs.   He has been working on  making healthy food choices.   I have no blood  sugar readings since Mark does not check his blood sugar.  Freestyle Ama sensor/CGM is not covered by his insurance.  Again, I reviewed the recent studies on Jardiance- cardiovascular and renal benefits of this drug, how the drug works and possible side effects of the medication including dehydration, UTIs and groin yeast infection.  Reminded him to drink plenty of water.  Pt's creat was 1. 05 with GFR 76 mL/min in October 2023.  Patient's K + 5.6.  Reminded him he has a metabolic panel lab order.    He denies sx of diabetic neuropathy or foot ulcers.    2.  RETINOPATHY: Seen by Oph 11/18/2021 and was told he has diabetic retinopathy.  Mark plans to schedule a diabetic eye exam.    3.  MICROALBUMINURIA: Urine microalbuminuria +.  Pt is taking Lisinopril.    4.  HYPERLIPIDEMIA: LDL 95, HDL 58 and TG 80 in 9/2023.  Pt is taking Simvastatin and fish oil.    5. OVERWEIGHT: Encouraged pt to make healthy food choices.    6.  SUE: Using CPAP equipment nightly and feeling much better.    7.  HEALTH MAINTENANCE: Patient to see primary care provider for health maintenance.    8. FOLLOW UP: With me in 6 months.  AC ordered today.    Time spent reviewing chart and labs today = 5 minutes.  Time for video visit today=  12 minutes.  Time for documentation today = 15 minutes.    TOTAL TIME FOR VISIT TODAY = 32 minutes.    Elizabeth Guzman PA-C

## 2024-03-13 NOTE — LETTER
3/13/2024       RE: Mark Garces  [address] [facility] [id number]     Dear Colleague,    Thank you for referring your patient, Mark Garces, to the [facility] [place] at [facility]. Please see a copy of my visit note below.    Outcome for 03/05/24 11:04 AM: [employer] message sent  Lou Trevizo MA  Outcome for 03/06/24 1:00 PM: Patient is not checking blood sugars  Ruth Sanchez LPN     Patient is showing 5/5 [place] met.   Ruth Sanchez LPN      Virtual Visit Details    Type of service:  Video Visit   Video Start Time:   Video End Time:    Originating Location (pt. Location):     Distant Location (provider location):    Platform used for Video Visit:       Time of start: 9:01 am   Time of end: 9:13 am   Total duration of video visit:  12 minutes.  Providers location: Off-site  Patients location: [facility]   Mark Garces is a 73 year old male with type 2 diabetes mellitus. Video visit for diabetes follow up.  Pt's hx is significant for type 2 DM dx in 2007, obesity, hyperlipidemia, COVID19 infection Nov 2020, SUE and past hx of Hepatitis C which has been treated successfully.  He was seen by Oph in Nov 2021 and was told he has diabetic retinopathy.  He also has a hx of microalbuminuria.  No sx of diabetic neuropathy.  For his diabetes, he is currently taking Metformin 500 mg 2 tabs po BID, Jardiance 25 mg daily and Januvia 100 mg daily.  Mark is tolerating all his diabetic meds well.  Most recent A1C was 7.1% on October 17, 2023.    Previous A1C was 7.3 % on 4/27/2023 and A1C was  8.3 % on 1/12/2023.  Mark has not been checking his blood sugar.  Pt states he is not checking his blood sugar at home because he does not like to stick himself with the lancets. He was told his insurance will not cover a Freestyle Ama sensor or CGM.  On ROS today, he reports feeling better overall. Using his sleep apnea equipment.  Pt denies  blurred vision, n/v, SOB at rest, fever, chills or chest pain.  No abd pain, dysuria, hematuria or foot ulcers.   Intermittent loose stools.  He denies any numbness, tingling or pain in his feet.  Denies foot ulcers.  No sx of groin yeast infection.    DIABETES CARE:  Retinopathy: yes; seen by Oph in Nov 2021 at [facility] in [place] and he was told he has diabetic retinopathy.  He tells me he plans to schedule a diabetic eye exam.  Nephropathy: urine microalbuminuria + in 6/2022 and negative in 9/2023.   Pt taking Lisinopril.  Neuropathy: no diabetic neuropathy.  Foot exam: no exam today.  SUE: yes- using equipment.  Taking ASA: yes.  Lipids: LDL 95 in 9/2023. Pt taking Simvastatin.  Insulin: none.   DM meds: Metformin, Jardiance and Januvia.  Testing: not testing.   Freestyle Ama sensor/CGM not covered by insurance.    ROS   Please see under HPI.     ALLERGIES:   No Known Allergies      Prescription Medications as of 3/13/2024         Rx Number Disp Refills Start End Last Dispensed Date Next Fill Date Owning Pharmacy    aspirin 81 MG tablet  -- --  --       Sig: Take 1 tablet by mouth daily.    Class: Historical    Route: Oral    empagliflozin (JARDIANCE) 25 MG TABS tablet  90 tablet 1 10/18/2023 --   [facility] [place], [place] - [address]    Sig: Take 1 tablet (25 mg) by mouth daily    Class: E-Prescribe    Route: Oral    lisinopril (ZESTRIL) 10 MG tablet  90 tablet 1 1/4/2024 --   [facility] [place], [place] - [address]    Sig: Take 1 tablet (10 mg) by mouth daily    Class: E-Prescribe    Route: Oral    metFORMIN (GLUCOPHAGE) 500 MG tablet  360 tablet 1 10/18/2023 --   [place], [place] - [address]    Sig: TAKE 2 TABLETS BY MOUTH TWICE A DAY.    Class: E-Prescribe    Omega-3 Fatty Acids (FISH OIL) 500 MG CAPS  -- --  --       Sig: Take 2 capsules by mouth daily.    Class: Historical     Route: Oral    simvastatin (ZOCOR) 40 MG tablet  90 tablet 0 10/18/2023 --   [facility] - [place], [place] - [address]    Sig: Take 1 tab daily.    Class: E-Prescribe    sitagliptin (JANUVIA) 100 MG tablet  90 tablet 2 11/20/2023 --   [facility] - [place], [place] - [address]    Sig: Take 1 tablet (100 mg) by mouth daily - Oral    Class: E-Prescribe    blood glucose (NO BRAND SPECIFIED) test strip  200 strip 1 10/18/2023 --   [facility] - [place], [place] - [address]    Sig: Use to test blood sugar twice daily.  ONE TOUCH ULTRA STRIPS    Class: E-Prescribe          Family Hx   No family hx of diabetes.    Personal Hx   Smoke: None at this time.  ETOH : None at this time.    PMH   1. Type 2 Diabetes Mellitus dx in 2007.   2. Hepatitis C; pt underwent tx for Hep C and his Hep C has cleared.  3. Obesity.  4. Past hx of nicotine use.   5. Hx of undescended testis s/p surgery in 1971.   6. Anemia while undergoing hep C treatment.  7. Retinopathy.  8. Microalbuminuria.  9. Hyperlipidemia.  10.  Sleep apnea.    Physical Exam   No exam today.    Results   Creatinine   Date Value Ref Range Status   10/17/2023 1.05 0.67 - 1.17 mg/dL Final   09/01/2020 0.90 0.66 - 1.25 mg/dL Final     GFR Estimate   Date Value Ref Range Status   10/17/2023 76 >60 mL/min/1.73m2 Final   09/01/2020 86 >60 mL/min/[1.73_m2] Final     Comment:     Non  GFR Calc  Starting 12/18/2018, serum creatinine based estimated GFR (eGFR) will be   calculated using the Chronic Kidney Disease Epidemiology Collaboration   (CKD-EPI) equation.       Hemoglobin A1C   Date Value Ref Range Status   10/17/2023 7.1 (H) 0.0 - 5.6 % Final     Comment:     Normal <5.7%   Prediabetes 5.7-6.4%    Diabetes 6.5% or higher     Note: Adopted from ADA consensus guidelines.   09/01/2020 7.3 (H) 0 - 5.6 % Final     Comment:     Normal <5.7% Prediabetes 5.7-6.4%   Diabetes 6.5% or higher - adopted from ADA   consensus guidelines.       Potassium   Date Value Ref Range Status   10/17/2023 5.6 (H) 3.4 - 5.3 mmol/L Final   06/10/2022 4.5 3.5 - 5.0 mmol/L Final   04/09/2019 4.0 3.4 - 5.3 mmol/L Final     ALT   Date Value Ref Range Status   10/17/2023 12 0 - 70 U/L Final     Comment:     Reference intervals for this test were updated on 6/12/2023 to more accurately reflect our healthy population. There may be differences in the flagging of prior results with similar values performed with this method. Interpretation of those prior results can be made in the context of the updated reference intervals.     09/01/2020 18 0 - 70 U/L Final     AST   Date Value Ref Range Status   10/17/2023 15 0 - 45 U/L Final     Comment:     Reference intervals for this test were updated on 6/12/2023 to more accurately reflect our healthy population. There may be differences in the flagging of prior results with similar values performed with this method. Interpretation of those prior results can be made in the context of the updated reference intervals.   09/01/2020 7 0 - 45 U/L Final     TSH   Date Value Ref Range Status   09/25/2023 2.78 0.30 - 4.20 uIU/mL Final   11/15/2021 2.95 0.30 - 5.00 uIU/mL Final   09/01/2020 2.85 0.40 - 4.00 mU/L Final     T4 Free   Date Value Ref Range Status   09/07/2017 1.02 0.76 - 1.46 ng/dL Final         Cholesterol   Date Value Ref Range Status   09/25/2023 169 <200 mg/dL Final   01/12/2023 164 <200 mg/dL Final   09/01/2020 146 <200 mg/dL Final   04/09/2019 152 <200 mg/dL Final     HDL Cholesterol   Date Value Ref Range Status   09/01/2020 53 >39 mg/dL Final   04/09/2019 55 >39 mg/dL Final     Direct Measure HDL   Date Value Ref Range Status   09/25/2023 58 >=40 mg/dL Final   01/12/2023 51 >=40 mg/dL Final     LDL Cholesterol Calculated   Date Value Ref Range Status   09/25/2023 95 <=100 mg/dL Final   01/12/2023 89 <=100 mg/dL Final   09/01/2020 79 <100 mg/dL Final      Comment:     Desirable:       <100 mg/dl   04/09/2019 77 <100 mg/dL Final     Comment:     Desirable:       <100 mg/dl     Triglycerides   Date Value Ref Range Status   09/25/2023 80 <150 mg/dL Final   01/12/2023 118 <150 mg/dL Final   09/01/2020 71 <150 mg/dL Final   04/09/2019 96 <150 mg/dL Final     Cholesterol/HDL Ratio   Date Value Ref Range Status   08/20/2014 2.8 0.0 - 5.0 Final   11/19/2013 3.7 0.0 - 5.0 Final       ASSESSMENT/PLAN:     1. TYPE 2 DIABETES MELLITUS: Mark continues to tolerate all his diabetic meds.  A1C ordered today.  Continue Jardiance 25 mg each am, Januvia 100 mg daily and Metformin 1000 mg BID.  Mark would like to avoid injectable drugs.   He has been working on  making healthy food choices.   I have no blood sugar readings since Mark does not check his blood sugar.  Freestyle Ama sensor/CGM is not covered by his insurance.  Again, I reviewed the recent studies on Jardiance- cardiovascular and renal benefits of this drug, how the drug works and possible side effects of the medication including dehydration, UTIs and groin yeast infection.  Reminded him to drink plenty of water.  Pt's creat was 1. 05 with GFR 76 mL/min in October 2023.  Patient's K + 5.6.  Reminded him he has a metabolic panel lab order.    He denies sx of diabetic neuropathy or foot ulcers.    2.  RETINOPATHY: Seen by Oph 11/18/2021 and was told he has diabetic retinopathy.  Mark plans to schedule a diabetic eye exam.    3.  MICROALBUMINURIA: Urine microalbuminuria +.  Pt is taking Lisinopril.    4.  HYPERLIPIDEMIA: LDL 95, HDL 58 and TG 80 in 9/2023.  Pt is taking Simvastatin and fish oil.    5. OVERWEIGHT: Encouraged pt to make healthy food choices.    6.  SUE: Using CPAP equipment nightly and feeling much better.    7.  HEALTH MAINTENANCE: Patient to see primary care provider for health maintenance.    8. FOLLOW UP: With me in 6 months.  AC ordered today.    Time spent reviewing chart and labs today = 5  minutes.  Time for video visit today=  12 minutes.  Time for documentation today = 15 minutes.    TOTAL TIME FOR VISIT TODAY = 32 minutes.    Elizabeth Guzman PA-C

## 2024-03-13 NOTE — NURSING NOTE
Is the patient currently in the state of [place]? YES    Visit mode:VIDEO    If the visit is dropped, the patient can be reconnected by: VIDEO VISIT: Text to cell phone:   Telephone Information:   Mobile [phone]       Will anyone else be joining the visit? NO  (If patient encounters technical issues they should call [phone])    How would you like to obtain your AVS? MyChart    Are changes needed to the allergy or medication list? No    Reason for visit: RECHECK and Video Visit    Yakelin PETTIT

## 2024-04-04 NOTE — TELEPHONE ENCOUNTER
Patient Quality Outreach    Patient is due for the following:   Diabetes -  Eye Exam    Next Steps:   Patient needs to schedule eye exam and opthalmology office, patient will schedule and inform clinic of date and time for data abstraction    Type of outreach:    Phone, spoke to patient/parent.        Questions for provider review:    None           Ruth Moreno RN

## 2024-04-20 ENCOUNTER — HEALTH MAINTENANCE LETTER (OUTPATIENT)
Age: 74
End: 2024-04-20

## 2024-04-26 DIAGNOSIS — E11.65 TYPE 2 DIABETES MELLITUS WITH HYPERGLYCEMIA, WITHOUT LONG-TERM CURRENT USE OF INSULIN (H): ICD-10-CM

## 2024-04-28 ASSESSMENT — SLEEP AND FATIGUE QUESTIONNAIRES
HOW LIKELY ARE YOU TO NOD OFF OR FALL ASLEEP WHILE SITTING INACTIVE IN A PUBLIC PLACE: WOULD NEVER DOZE
HOW LIKELY ARE YOU TO NOD OFF OR FALL ASLEEP WHEN YOU ARE A PASSENGER IN A CAR FOR AN HOUR WITHOUT A BREAK: WOULD NEVER DOZE
HOW LIKELY ARE YOU TO NOD OFF OR FALL ASLEEP IN A CAR, WHILE STOPPED FOR A FEW MINUTES IN TRAFFIC: WOULD NEVER DOZE
HOW LIKELY ARE YOU TO NOD OFF OR FALL ASLEEP WHILE WATCHING TV: WOULD NEVER DOZE
HOW LIKELY ARE YOU TO NOD OFF OR FALL ASLEEP WHILE LYING DOWN TO REST IN THE AFTERNOON WHEN CIRCUMSTANCES PERMIT: SLIGHT CHANCE OF DOZING
HOW LIKELY ARE YOU TO NOD OFF OR FALL ASLEEP WHILE SITTING QUIETLY AFTER LUNCH WITHOUT ALCOHOL: WOULD NEVER DOZE
HOW LIKELY ARE YOU TO NOD OFF OR FALL ASLEEP WHILE SITTING AND READING: WOULD NEVER DOZE
HOW LIKELY ARE YOU TO NOD OFF OR FALL ASLEEP WHILE SITTING AND TALKING TO SOMEONE: WOULD NEVER DOZE

## 2024-04-29 ENCOUNTER — OFFICE VISIT (OUTPATIENT)
Dept: SLEEP MEDICINE | Facility: CLINIC | Age: 74
End: 2024-04-29
Payer: MEDICARE

## 2024-04-29 VITALS
BODY MASS INDEX: 35.29 KG/M2 | HEART RATE: 100 BPM | OXYGEN SATURATION: 96 % | WEIGHT: 253 LBS | RESPIRATION RATE: 16 BRPM | SYSTOLIC BLOOD PRESSURE: 130 MMHG | DIASTOLIC BLOOD PRESSURE: 64 MMHG

## 2024-04-29 DIAGNOSIS — G47.33 OBSTRUCTIVE SLEEP APNEA SYNDROME: Primary | ICD-10-CM

## 2024-04-29 DIAGNOSIS — G47.33 OSA (OBSTRUCTIVE SLEEP APNEA): ICD-10-CM

## 2024-04-29 DIAGNOSIS — E66.01 MORBID OBESITY (H): ICD-10-CM

## 2024-04-29 PROCEDURE — 99214 OFFICE O/P EST MOD 30 MIN: CPT | Performed by: NURSE PRACTITIONER

## 2024-04-29 NOTE — PATIENT INSTRUCTIONS
Drive Safe... Drive Alive     Sleep health profoundly affects your health, mood, and your safety. 33% of the population (one in three of us) is not getting enough sleep and many have a sleep disorder. Not getting enough sleep or having an untreated / undertreated sleep condition may make us sleepy without even knowing it. In fact, our driving could be dramatically impaired due to our sleep health. As your provider, here are some things I would like you to know about driving:     Here are some warning signs for impairment and dangerous drowsy driving:              -Having been awake more than 16 hours               -Looking tired               -Eyelid drooping              -Head nodding (it could be too late at this point)              -Driving for more than 30 minutes     Some things you could do to make the driving safer if you are experiencing some drowsiness:              -Stop driving and rest              -Call for transportation              -Make sure your sleep disorder is adequately treated     Some things that have been shown NOT to work when experiencing drowsiness while driving:              -Turning on the radio              -Opening windows              -Eating any  distracting  /  entertaining  foods (e.g., sunflower seeds, candy, or any other)              -Talking on the phone      Your decision may not only impact your life, but also the life of others. Please, remember to drive safe for yourself and all of us. Your BMI is Body mass index is 35.29 kg/m .    What is BMI?  Body mass index (BMI) is one way to tell whether you are at a healthy weight, overweight, or obese. It measures your weight in relation to your height.  A BMI of 18.5 to 24.9 is in the healthy range. A person with a BMI of 25 to 29.9 is considered overweight, and someone with a BMI of 30 or greater is considered obese.  Another way to find out if you are at risk for health problems caused by overweight and obesity is to measure  your waist. If you are a woman and your waist is more than 35 inches, or if you are a man and your waist is more than 40 inches, your risk of disease may be higher.  More than two-thirds of American adults are considered overweight or obese. Being overweight or obese increases the risk for further weight gain.  Excess weight may lead to heart disease and diabetes. Creating and following plans for healthy eating and physical activity may help you improve your health.    Methods for maintaining or losing weight.  Weight control is part of healthy lifestyle and includes exercise, emotional health, and healthy eating habits.  Careful eating habits lifelong is the mainstay of weight control.  Though there are significant health benefits from weight loss, long-term weight loss with diet alone may be very difficult to achieve- studies show long-term success with dietary management in less than 10% of people. Attaining a healthy weight may be especially difficult to achieve in those with severe obesity. In some cases, medications, devices and surgical management might be considered.    What can you do?  If you are overweight or obese and are interested in methods for weight loss, you should discuss this with your provider. In addition, we recommend that you review healthy life styles and methods for weight loss available through the National Institutes of Health patient information sites:   http://win.niddk.nih.gov/publications/index.htm         Your Body mass index is 35.29 kg/m .  Weight management is a personal decision.  If you are interested in exploring weight loss strategies, the following discussion covers the approaches that may be successful. Body mass index (BMI) is one way to tell whether you are at a healthy weight, overweight, or obese. It measures your weight in relation to your height.  A BMI of 18.5 to 24.9 is in the healthy range. A person with a BMI of 25 to 29.9 is considered overweight, and someone with  a BMI of 30 or greater is considered obese. More than two-thirds of American adults are considered overweight or obese.  Being overweight or obese increases the risk for further weight gain. Excess weight may lead to heart disease and diabetes.  Creating and following plans for healthy eating and physical activity may help you improve your health.  Weight control is part of healthy lifestyle and includes exercise, emotional health, and healthy eating habits. Careful eating habits lifelong are the mainstay of weight control. Though there are significant health benefits from weight loss, long-term weight loss with diet alone may be very difficult to achieve- studies show long-term success with dietary management in less than 10% of people. Attaining a healthy weight may be especially difficult to achieve in those with severe obesity. In some cases, medications, devices and surgical management might be considered.  What can you do?  If you are overweight or obese and are interested in methods for weight loss, you should discuss this with your provider.   Consider reducing daily calorie intake by 500 calories.   Keep a food journal.   Avoiding skipping meals, consider cutting portions instead.    Diet combined with exercise helps maintain muscle while optimizing fat loss. Strength training is particularly important for building and maintaining muscle mass. Exercise helps reduce stress, increase energy, and improves fitness. Increasing exercise without diet control, however, may not burn enough calories to loose weight.     Start walking three days a week 10-20 minutes at a time  Work towards walking thirty minutes five days a week   Eventually, increase the speed of your walking for 1-2 minutes at time    In addition, we recommend that you review healthy lifestyles and methods for weight loss available through the National Institutes of Health patient information  sites:  http://win.niddk.nih.gov/publications/index.htm    And look into health and wellness programs that may be available through your health insurance provider, employer, local community center, or susan club.

## 2024-04-29 NOTE — PROGRESS NOTES
Obstructive Sleep Apnea - PAP Follow-Up Visit:    Chief Complaint   Patient presents with    CPAP Follow Up       Mark Garces comes in today for follow-up of their severe sleep apnea, managed with CPAP.     Mark has a past medical history notable for type 2 diabetes mellitus diagnosed in 2007, obesity, class II, hyperlipidemia, COVID 19 infection in November, 2020, past history of hepatitis C which has been treated successfully, lumbar back pain, cervicalgia, anemia.  Patient has a past history of cigarette smoking.  He has not ever smoked any drugs of abuse.  He does not drink alcohol     Do you use a CPAP Machine at home: Yes  Overall, on a scale of 0-10 how would you rate your CPAP (0 poor, 10 great): 10  Is your mask comfortable: Yes  If not, why:    Is you mask leaking: No  If yes, where do you feel it:    How many night per week does the mask leak (0-7):    Do you notice snoring with mask on: No  Do you notice gasping arousals with mask on: No  Are you having significant oral or nasal dryness: No  Is the pressure setting comfortable: Yes  In not, why:    What type of mask do you use: Full Face Mask  What is your typical bedtime: 11pm  How long does it take you to go to sleep on PAP therapy: 10-30 minutes  What time do you typically get out of bed for the day: 7am  How many hours on average per night are you using PAP therapy: 8  How many hours are you sleeping per night: 7  Do you feel well rested in the morning: Yes    EPWORTH SLEEPINESS SCALE         4/28/2024     2:53 PM    [place] Sleepiness Scale ( ESPINOZA Alatorre  [id number]<br>ESS - [place]/English - Final version - 21 Nov 07 - [facility] [place].)   Sitting and reading Would never doze   Watching TV Would never doze   Sitting, inactive in a public place (e.g. a theatre or a meeting) Would never doze   As a passenger in a car for an hour without a break Would never doze   Lying down to rest in the afternoon when circumstances permit Slight chance of  dozing   Sitting and talking to someone Would never doze   Sitting quietly after a lunch without alcohol Would never doze   In a car, while stopped for a few minutes in traffic Would never doze   [place] Score (MC) 1   [place] Score (Sleep) 1       INSOMNIA SEVERITY INDEX (KALPANA)          4/28/2024     2:49 PM   Insomnia Severity Index (KALPANA)   Difficulty falling asleep 0   Difficulty staying asleep 0   Problems waking up too early 1   How SATISFIED/DISSATISFIED are you with your CURRENT sleep pattern? 0   How NOTICEABLE to others do you think your sleep problem is in terms of impairing the quality of your life? 0   How WORRIED/DISTRESSED are you about your current sleep problem? 0   To what extent do you consider your sleep problem to INTERFERE with your daily functioning (e.g. daytime fatigue, mood, ability to function at work/daily chores, concentration, memory, mood, etc.) CURRENTLY? 0   KALPANA Total Score 1       Guidelines for Scoring/Interpretation:  Total score categories:  0-7 = No clinically significant insomnia   8-14 = Subthreshold insomnia   15-21 = Clinical insomnia (moderate severity)  22-28 = Clinical insomnia (severe)  Used via courtesy of www.RevolutionCredit.va.gov with permission from Joao Whitehead PhD., [facility]    ResMed   Auto-PAP 7.0 - 20.0 cmH2O 30 day usage data:    100% of days with > 4 hours of use. 0/30 days with no use.   Average use 440 minutes per day.   95%ile Leak 6.62 L/min.   CPAP 95% pressure 18.1 cm.   AHI 9.37 events per hour.     Discussed with patient that a titration study will be ordered. His AHI of 9.1 event/hours places him over the 5 events threshold of acceptability.  Patient denies any daytime sleepiness, denies morning headaches, denies snoring or gasping while sleeping.  Patient notes that his head gear is loose on 1 side which can cause leaking.  He is understandably reluctant to have a titration study due to the possibility of the need for BiPAP machine and he is  going to have a CPAP machine paid off in July.    Previous Sleep Studies  Patient underwent a split-night sleep study on the evening of 6/28/2023 at [facility]  Relevant medical history included morbid obesity, low back pain and cervicalgia.  A diagnostic polysomnogram was performed to evaluate for sleep apnea.  After 123 minutes of sleep time the patient exhibited sufficient respiratory events qualifying for CPAP trial which was then initiated     AHI, combined: 69 events/hour  REM AHI:-  AHI, supine: 113.1 events/hour  RDI: 73.8 events/hour  SaO2, baseline: 90.6%  SaO2, elaine: 83.0%  Time spent less than/equal to 88%: 31.1 minutes  Time spent less than/equal to 89%: 50.5 minutes     Patient underwent CPAP titration from a pressure of 5-7 CWP.  Optimal pressure was not obtained.  The titration was considered: Unacceptable     6/28/2023 [place] Split Sleep Study (247.0 lbs) - AHI 69.0, RDI 73.8, Supine .1, REM AHI -, Low O2% 83.0%, Time Spent ?88% 31.1, Time Spent ?89% 50.5. Treatment was titrated to a pressure of CPAP 6 with an AHI -. Time spent in REM supine at this pressure was - minutes        DME provider: [facility], patient uses ResMed AirSense 10 with pressure set 7-20 cm H2O.  Patient uses AirFit F20 fullface mask, size medium      Reviewed by team:  Tobacco  Allergies  Meds  Problems  Med Hx  Surg Hx  Fam Hx        Reviewed by provider:  Tobacco  Allergies  Meds  Problems  Med Hx  Surg Hx  Fam Hx           Problem List:  Patient Active Problem List    Diagnosis Date Noted    Sleep apnea 08/25/2023     Priority: Medium    Morbid obesity (H) 11/30/2021     Priority: Medium    Dyslipidemia 11/29/2021     Priority: Medium     Formatting of this note might be different from the original.  Created by Conversion      Cervicalgia 02/18/2015     Priority: Medium    Anemia 08/29/2012     Priority: Medium    DM (diabetes mellitus), type 2 (H)  04/24/2012     Priority: Medium    Obesity 04/24/2012     Priority: Medium     Problem list name updated by automated process. Provider to review            /64   Pulse 100   Resp 16   Wt 114.8 kg (253 lb)   SpO2 96%   BMI 35.29 kg/m      Impression/Plan:  Severe Obstructive Sleep Apnea    ICD-10-CM    1. Obstructive sleep apnea syndrome  G47.33 Sleep Comprehensive DME      2. Morbid obesity (H)  E66.01 Sleep Comprehensive DME      3. SUE (obstructive sleep apnea)  G47.33 Sleep Comprehensive DME        Mark admits that he uses his CPAP on a nightly basis and he attempts to do so for the entire duration of his sleep.  He denies symptoms commonly associated with obstructive sleep apnea such as daytime sleepiness as well as intrusion of sleepiness into his driving capabilities.  He realizes the benefits that CPAP brings to him.  A comprehensive order for needed supplies and equipment for the coming year was placed.  Recommend that patient optimize his sleep schedule as well as his sleep hygiene practices to mitigate any further sleep disruption  Recommend the patient employ safe driving practices such as not driving motor vehicle should he become drowsy  Recommend BMI of 30.0  Recommend patient follow-up with the sleep medicine clinic after his titration sleep study with a [employer] message.  His results will be relayed to him, and his plan of care will be amended as appropriate.  .     30 minutes spent with patient, all of which were spent face-to-face counseling, consulting, coordinating plan of care.      SPIKE Murphy CNP  Sleep Medicine    This note was written with the assistance of the Dragon voice-dictation technology software. The final document, although reviewed, may contain errors. For corrections, please contact the office.      CC:  Eloy Anne,

## 2024-04-29 NOTE — NURSING NOTE
"Chief Complaint   Patient presents with    CPAP Follow Up       Initial /64   Pulse 100   Resp 16   Wt 114.8 kg (253 lb)   SpO2 96%   BMI 35.29 kg/m   Estimated body mass index is 35.29 kg/m  as calculated from the following:    Height as of 10/17/23: 1.803 m (5' 11\").    Weight as of this encounter: 114.8 kg (253 lb).    Medication Reconciliation: complete    Neck circumference:    inches /    centimeters.    DME: PRAKASH Gama MA  [facility]        "

## 2024-05-03 NOTE — TELEPHONE ENCOUNTER
metFORMIN (GLUCOPHAGE) 500 MG tablet 360 tablet 1 10/18/2023       Biguanide Agents [id number]/26/2024 02:58 PM   Protocol Details Patient has documented A1c within the specified period of time.        empagliflozin (JARDIANCE) 25 MG TABS tablet 90 tablet 1 10/18/2023       Sodium Glucose Co-Transport Inhibitor Agents [id number]/26/2024 02:58 PM   Protocol Details Patient has documented A1c within the specified period of time.    Patient has documented normal Potassium within the last 12 mos.        Component      Latest Ref Rng 10/17/2023  1:10 PM   Sodium      135 - 145 mmol/L 135    Potassium      3.4 - 5.3 mmol/L 5.6 (H)    Carbon Dioxide (CO2)      22 - 29 mmol/L 22    Anion Gap      7 - 15 mmol/L 10    Urea Nitrogen      8.0 - 23.0 mg/dL 27.8 (H)    Creatinine      0.67 - 1.17 mg/dL 1.05    GFR Estimate      >60 mL/min/1.73m2 76    Calcium      8.8 - 10.2 mg/dL 10.0    Chloride      98 - 107 mmol/L 103    Glucose      70 - 99 mg/dL 189 (H)    Alkaline Phosphatase      40 - 129 U/L 67    AST      0 - 45 U/L 15    ALT      0 - 70 U/L 12    Protein Total      6.4 - 8.3 g/dL 7.0    Albumin      3.5 - 5.2 g/dL 4.4    Bilirubin Total      <=1.2 mg/dL 0.3    Hemoglobin A1C      0.0 - 5.6 % 7.1 (H)        Last Office Visit: 3/13/24  Future Office visit:   none    Routing refill request to provider for review/approval because:  Abnormal lab-potassium  Overdue HGB A1C    Laila Aguilera RN  [facility] Red Flag Triage/MRT

## 2024-05-13 DIAGNOSIS — E11.65 TYPE 2 DIABETES MELLITUS WITH HYPERGLYCEMIA, WITHOUT LONG-TERM CURRENT USE OF INSULIN (H): ICD-10-CM

## 2024-05-19 ENCOUNTER — MYC REFILL (OUTPATIENT)
Dept: ENDOCRINOLOGY | Facility: CLINIC | Age: 74
End: 2024-05-19
Payer: COMMERCIAL

## 2024-05-19 DIAGNOSIS — E11.8 TYPE 2 DIABETES MELLITUS WITH COMPLICATION, WITH LONG-TERM CURRENT USE OF INSULIN (H): ICD-10-CM

## 2024-05-19 DIAGNOSIS — Z79.4 TYPE 2 DIABETES MELLITUS WITH COMPLICATION, WITH LONG-TERM CURRENT USE OF INSULIN (H): ICD-10-CM

## 2024-05-19 DIAGNOSIS — E11.65 TYPE 2 DIABETES MELLITUS WITH HYPERGLYCEMIA, WITHOUT LONG-TERM CURRENT USE OF INSULIN (H): ICD-10-CM

## 2024-05-20 RX ORDER — SIMVASTATIN 40 MG
TABLET ORAL
Qty: 90 TABLET | Refills: 1 | Status: SHIPPED | OUTPATIENT
Start: 2024-05-20

## 2024-05-20 RX ORDER — LISINOPRIL 10 MG/1
10 TABLET ORAL DAILY
Qty: 90 TABLET | Refills: 1 | Status: SHIPPED | OUTPATIENT
Start: 2024-05-20

## 2024-05-22 ENCOUNTER — LAB (OUTPATIENT)
Dept: LAB | Facility: CLINIC | Age: 74
End: 2024-05-22
Payer: COMMERCIAL

## 2024-05-22 DIAGNOSIS — E11.65 TYPE 2 DIABETES MELLITUS WITH HYPERGLYCEMIA, WITHOUT LONG-TERM CURRENT USE OF INSULIN (H): ICD-10-CM

## 2024-05-22 LAB — HBA1C MFR BLD: 6.9 % (ref 0–5.6)

## 2024-05-22 PROCEDURE — 83036 HEMOGLOBIN GLYCOSYLATED A1C: CPT

## 2024-05-22 PROCEDURE — 36415 COLL VENOUS BLD VENIPUNCTURE: CPT

## 2024-05-22 RX ORDER — EMPAGLIFLOZIN 25 MG/1
25 TABLET, FILM COATED ORAL DAILY
Qty: 90 TABLET | Refills: 0 | OUTPATIENT
Start: 2024-05-22

## 2024-06-28 ENCOUNTER — MYC MEDICAL ADVICE (OUTPATIENT)
Dept: FAMILY MEDICINE | Facility: CLINIC | Age: 74
End: 2024-06-28
Payer: COMMERCIAL

## 2024-06-28 NOTE — LETTER
July 25, 2024      Mark Garces  [address] [place] [id number]        Dear Mark,        We see that you are due for an eye exam.  Eye problems are a serious complication of diabetes and we want to make sure that your eyes are healthy.   Diabetes can lead to diabetic retinopathy, diabetic macular edema and blindness.  Please schedule an eye exam with an eye professional (ophthamology or optometrist) and let your PCP here at [facility] aware once it is completed so the results get in your record.    If you have had a recent eye exam, in the past year, please send us the location and date that this was completed so we can update your records.     Thank you!    ZULY QuezadaN, RN  [facility]

## 2024-07-25 NOTE — TELEPHONE ENCOUNTER
Writer called patient and left message for patient to return call to clinic  regarding diabetic eye exam.     If patient returns call please inform patient they are overdue for an eye exam and ask if they have been seen elsewhere for these services in the past year. If so please get the date and location of last eye exam and route information back to writer.    Letter sent to patient.     KAN Quezada, RN  [facility]      Patient Quality Outreach    Patient is due for the following:   Diabetes -  Eye Exam    Next Steps:   Patient to schedule appointment at eye clinc    Type of outreach:    Sent [employer] message.    Next Steps:  Reach out within 90 days via Phone and Letter.    Max number of attempts reached: Yes. Will try again in 90 days if patient still on fail list.    Questions for provider review:    None           Ruth Moreno, ALIVIA

## 2024-07-31 ENCOUNTER — TRANSFERRED RECORDS (OUTPATIENT)
Dept: HEALTH INFORMATION MANAGEMENT | Facility: CLINIC | Age: 74
End: 2024-07-31
Payer: COMMERCIAL

## 2024-07-31 LAB — RETINOPATHY: NEGATIVE

## 2024-09-17 DIAGNOSIS — E11.65 TYPE 2 DIABETES MELLITUS WITH HYPERGLYCEMIA, WITHOUT LONG-TERM CURRENT USE OF INSULIN (H): ICD-10-CM

## 2024-09-24 RX ORDER — LISINOPRIL 10 MG/1
10 TABLET ORAL DAILY
Qty: 90 TABLET | Refills: 0 | OUTPATIENT
Start: 2024-09-24

## 2024-09-24 NOTE — TELEPHONE ENCOUNTER
Medication Requested:  lisinopril (ZESTRIL) 10 MG tablet 90 tablet 1 5/20/2024 -- No   Sig - Route: Take 1 tablet (10 mg) by mouth daily - Oral         Refills on file

## 2024-10-11 DIAGNOSIS — Z79.4 TYPE 2 DIABETES MELLITUS WITH COMPLICATION, WITH LONG-TERM CURRENT USE OF INSULIN (H): ICD-10-CM

## 2024-10-11 DIAGNOSIS — E11.8 TYPE 2 DIABETES MELLITUS WITH COMPLICATION, WITH LONG-TERM CURRENT USE OF INSULIN (H): ICD-10-CM

## 2024-10-17 RX ORDER — SIMVASTATIN 40 MG
TABLET ORAL
Qty: 90 TABLET | Refills: 0 | Status: SHIPPED | OUTPATIENT
Start: 2024-10-17

## 2024-10-17 SDOH — HEALTH STABILITY: PHYSICAL HEALTH: ON AVERAGE, HOW MANY MINUTES DO YOU ENGAGE IN EXERCISE AT THIS LEVEL?: 40 MIN

## 2024-10-17 SDOH — HEALTH STABILITY: PHYSICAL HEALTH: ON AVERAGE, HOW MANY DAYS PER WEEK DO YOU ENGAGE IN MODERATE TO STRENUOUS EXERCISE (LIKE A BRISK WALK)?: 5 DAYS

## 2024-10-17 ASSESSMENT — SOCIAL DETERMINANTS OF HEALTH (SDOH): HOW OFTEN DO YOU GET TOGETHER WITH FRIENDS OR RELATIVES?: ONCE A WEEK

## 2024-10-17 NOTE — TELEPHONE ENCOUNTER
Simvastatin 40mg Tablet      Last Written Prescription Date:  5/20/24  Last Fill Quantity: 90,   # refills: 1  Last Office Visit : 3/13/24  Future Office visit:  none    Routing refill request to provider for review/approval because:  Overdue LDL  LDL Cholesterol Calculated   Date Value Ref Range Status   09/25/2023 95 <=100 mg/dL Final   09/01/2020 79 <100 mg/dL Final     Comment:     Desirable:       <100 mg/dl   Elizabeth Guzman not available     Rituxan Counseling:  I discussed with the patient the risks of Rituxan infusions. Side effects can include infusion reactions, severe drug rashes including mucocutaneous reactions, reactivation of latent hepatitis and other infections and rarely progressive multifocal leukoencephalopathy.  All of the patient's questions and concerns were addressed.

## 2024-10-21 ENCOUNTER — OFFICE VISIT (OUTPATIENT)
Dept: FAMILY MEDICINE | Facility: CLINIC | Age: 74
End: 2024-10-21
Payer: COMMERCIAL

## 2024-10-21 VITALS
DIASTOLIC BLOOD PRESSURE: 68 MMHG | TEMPERATURE: 98 F | HEIGHT: 71 IN | RESPIRATION RATE: 18 BRPM | OXYGEN SATURATION: 98 % | SYSTOLIC BLOOD PRESSURE: 132 MMHG | HEART RATE: 69 BPM | BODY MASS INDEX: 35.14 KG/M2 | WEIGHT: 251 LBS

## 2024-10-21 DIAGNOSIS — E78.5 DYSLIPIDEMIA: ICD-10-CM

## 2024-10-21 DIAGNOSIS — Z00.00 MEDICARE ANNUAL WELLNESS VISIT, SUBSEQUENT: Primary | ICD-10-CM

## 2024-10-21 DIAGNOSIS — E11.69 TYPE 2 DIABETES MELLITUS WITH OTHER SPECIFIED COMPLICATION, WITHOUT LONG-TERM CURRENT USE OF INSULIN (H): ICD-10-CM

## 2024-10-21 DIAGNOSIS — G47.33 OSA (OBSTRUCTIVE SLEEP APNEA): ICD-10-CM

## 2024-10-21 LAB
ALBUMIN SERPL BCG-MCNC: 4.4 G/DL (ref 3.5–5.2)
ALP SERPL-CCNC: 74 U/L (ref 40–150)
ALT SERPL W P-5'-P-CCNC: 13 U/L (ref 0–70)
ANION GAP SERPL CALCULATED.3IONS-SCNC: 11 MMOL/L (ref 7–15)
AST SERPL W P-5'-P-CCNC: 17 U/L (ref 0–45)
BILIRUB SERPL-MCNC: 0.4 MG/DL
BUN SERPL-MCNC: 19.3 MG/DL (ref 8–23)
CALCIUM SERPL-MCNC: 10.6 MG/DL (ref 8.8–10.4)
CHLORIDE SERPL-SCNC: 104 MMOL/L (ref 98–107)
CHOLEST SERPL-MCNC: 158 MG/DL
CREAT SERPL-MCNC: 1 MG/DL (ref 0.67–1.17)
CREAT UR-MCNC: 14.6 MG/DL
EGFRCR SERPLBLD CKD-EPI 2021: 80 ML/MIN/1.73M2
EST. AVERAGE GLUCOSE BLD GHB EST-MCNC: 169 MG/DL
FASTING STATUS PATIENT QL REPORTED: YES
FASTING STATUS PATIENT QL REPORTED: YES
GLUCOSE SERPL-MCNC: 149 MG/DL (ref 70–99)
HBA1C MFR BLD: 7.5 % (ref 0–5.6)
HCO3 SERPL-SCNC: 27 MMOL/L (ref 22–29)
HDLC SERPL-MCNC: 59 MG/DL
LDLC SERPL CALC-MCNC: 81 MG/DL
MICROALBUMIN UR-MCNC: <12 MG/L
MICROALBUMIN/CREAT UR: NORMAL MG/G{CREAT}
NONHDLC SERPL-MCNC: 99 MG/DL
POTASSIUM SERPL-SCNC: 4.7 MMOL/L (ref 3.4–5.3)
PROT SERPL-MCNC: 7.6 G/DL (ref 6.4–8.3)
SODIUM SERPL-SCNC: 142 MMOL/L (ref 135–145)
TRIGL SERPL-MCNC: 91 MG/DL

## 2024-10-21 PROCEDURE — 80061 LIPID PANEL: CPT | Performed by: FAMILY MEDICINE

## 2024-10-21 PROCEDURE — 36415 COLL VENOUS BLD VENIPUNCTURE: CPT | Performed by: FAMILY MEDICINE

## 2024-10-21 PROCEDURE — G0439 PPPS, SUBSEQ VISIT: HCPCS | Performed by: FAMILY MEDICINE

## 2024-10-21 PROCEDURE — 83036 HEMOGLOBIN GLYCOSYLATED A1C: CPT | Performed by: FAMILY MEDICINE

## 2024-10-21 PROCEDURE — 82570 ASSAY OF URINE CREATININE: CPT | Performed by: FAMILY MEDICINE

## 2024-10-21 PROCEDURE — 82043 UR ALBUMIN QUANTITATIVE: CPT | Performed by: FAMILY MEDICINE

## 2024-10-21 PROCEDURE — 80053 COMPREHEN METABOLIC PANEL: CPT | Performed by: FAMILY MEDICINE

## 2024-10-21 PROCEDURE — 99214 OFFICE O/P EST MOD 30 MIN: CPT | Mod: 25 | Performed by: FAMILY MEDICINE

## 2024-10-21 ASSESSMENT — ACTIVITIES OF DAILY LIVING (ADL)
HEARING_DIFFICULTY_OR_DEAF: NO
TOILETING_ISSUES: NO
WEAR_GLASSES_OR_BLIND: YES
DIFFICULTY_COMMUNICATING: NO
DOING_ERRANDS_INDEPENDENTLY_DIFFICULTY: NO
CHANGE_IN_FUNCTIONAL_STATUS_SINCE_ONSET_OF_CURRENT_ILLNESS/INJURY: NO
CONCENTRATING,_REMEMBERING_OR_MAKING_DECISIONS_DIFFICULTY: NO
WALKING_OR_CLIMBING_STAIRS_DIFFICULTY: NO
FALL_HISTORY_WITHIN_LAST_SIX_MONTHS: NO
DRESSING/BATHING_DIFFICULTY: NO
DIFFICULTY_EATING/SWALLOWING: NO

## 2024-10-21 ASSESSMENT — PAIN SCALES - GENERAL: PAINLEVEL: NO PAIN (0)

## 2024-10-21 NOTE — PROGRESS NOTES
Preventive Care Visit  [facility]  Eloy Anne MD, MD, Family Medicine  Oct 21, 2024      Mark was seen today for recheck medication, wellness visit, diabetes and hypertension.    Diagnoses and all orders for this visit:    Medicare annual wellness visit, subsequent    Type 2 diabetes mellitus with other specified complication, without long-term current use of insulin (H)  -     Hemoglobin A1c; Future  -     Comprehensive metabolic panel (BMP + Alb, Alk Phos, ALT, AST, Total. Bili, TP); Future  -     Lipid panel reflex to direct LDL Fasting; Future  -     CBC with platelets  -     Albumin Random Urine Quantitative with Creat Ratio; Future  -     Hemoglobin A1c  -     Comprehensive metabolic panel (BMP + Alb, Alk Phos, ALT, AST, Total. Bili, TP)  -     Lipid panel reflex to direct LDL Fasting  -     Albumin Random Urine Quantitative with Creat Ratio    SUE (obstructive sleep apnea)    Dyslipidemia  -     Lipid panel reflex to direct LDL Fasting; Future  -     Lipid panel reflex to direct LDL Fasting           Subjective   Mark is a 73 year old, presenting for the following:  Recheck Medication, Wellness Visit, Diabetes, and Hypertension    He has type 2 diabetes.  He follows with endocrinology.  He is on metformin, Jardiance and Januvia.  He admittedly has been less diligent about watching his carbs.  His A1c remains in a well-controlled range at 7.5 but is a bit higher than previous numbers.  Would recommend continuing to buckle down on his diet and try to maintain physical activity.  He denies exam he has no history of retinopathy.  Foot exam is performed today he does report some mild neuropathy type symptoms but there are no significant findings on his foot exam.  A urine microalbumin is obtained today.  He has had normal renal function.  He is on appropriate medications for vascular disease risk reduction he does not report any vascular disease symptoms at this time.    He does  tend to have difficulty with ambulation secondary to degenerative changes in both legs as well as likely radicular symptoms that involve the right leg leading to pain throughout the right leg.  For this reason he request handicap parking and based on our conversation I think this is an appropriate situation for him to have this accommodation.    We talked about management of his sleep apnea.  He feels his CPAP has been life-changing.  There is some concern that the machine is giving him reading that suggest he may need a therapy beyond CPAP, specifically BiPAP has been suggested.  We talked about this today he is planning to follow through with additional evaluation to consider this form of treatment.    We talked about routine health prevention.  He is overdue for colonoscopy he is encouraged to get this scheduled.  We talked about appropriate immunizations.    Health Care Directive  Patient does not have a Health Care Directive or Living Will: Advance Directive received and scanned. Click on Code in the patient header to view.      Do you have a current opioid prescription? no  Do you use any other controlled substances or medications that are not prescribed by a provider?  no            10/17/2024   General Health   How would you rate your overall physical health? (!) FAIR   Feel stress (tense, anxious, or unable to sleep) Not at all            10/17/2024   Nutrition   Diet: Diabetic            10/17/2024   Exercise   Days per week of moderate/strenous exercise 5 days   Average minutes spent exercising at this level 40 min            10/17/2024   Social Factors   Frequency of gathering with friends or relatives Once a week   Worry food won't last until get money to buy more No   Food not last or not have enough money for food? No   Do you have housing? (Housing is defined as stable permanent housing and does not include staying ouside in a car, in a tent, in an abandoned building, in an overnight shelter, or  couch-surfing.) Yes   Are you worried about losing your housing? No   Lack of transportation? No   Unable to get utilities (heat,electricity)? Yes   Want help with housing or utility concern? No      (!) FINANCIAL RESOURCE STRAIN CONCERN      10/21/2024   Fall Risk   Fallen 2 or more times in the past year? No    No   Trouble with walking or balance? No    Yes       Multiple values from one day are sorted in reverse-chronological order          10/17/2024   Activities of Daily Living- Home Safety   Needs help with the following daily activites None of the above   Safety concerns in the home None of the above            10/17/2024   Dental   Dentist two times every year? (!) NO            10/17/2024   Hearing Screening   Hearing concerns? None of the above            10/17/2024   Driving Risk Screening   Patient/family members have concerns about driving No            10/17/2024   General Alertness/Fatigue Screening   Have you been more tired than usual lately? No            10/17/2024   Urinary Incontinence Screening   Bothered by leaking urine in past 6 months No            10/17/2024   TB Screening   Were you born outside of the [place]? No                  10/17/2024   Substance Use   Alcohol more than 3/day or more than 7/wk Not Applicable   Do you have a current opioid prescription? No   How severe/bad is pain from 1 to 10? 0/10 (No Pain)   Do you use any other substances recreationally? No        Social History     Tobacco Use    Smoking status: Former     Current packs/day: 0.00     Types: Cigarettes     Quit date: 2011     Years since quittin.9    Smokeless tobacco: Never   Vaping Use    Vaping status: Never Used   Substance Use Topics    Alcohol use: No    Drug use: No       ASCVD Risk   The 10-year ASCVD risk score (Kelsey YUSUF, et al., 2019) is: 33.5%    Values used to calculate the score:      Age: 73 years      Sex: Male      Is Non- : No      Diabetic: Yes       Tobacco smoker: No      Systolic Blood Pressure: 132 mmHg      Is BP treated: No      HDL Cholesterol: 58 mg/dL      Total Cholesterol: 169 mg/dL            Reviewed and updated as needed this visit by Provider                      Current providers sharing in care for this patient include:  Patient Care Team:  Eloy Anne MD as PCP - General (Family Medicine)  Benny Townsend MD as MD (Gastroenterology)  Elizabeth Guzman PA-C as Physician Assistant (Physician Assistant)  Elizabeth Guzman PA-C as Assigned Endocrinology Provider  Eloy Anne MD as Assigned PCP  Elizabeth Zarate APRN CNP as Assigned Pulmonology Provider    The following health maintenance items are reviewed in Epic and correct as of today:  Health Maintenance   Topic Date Due    DTAP/TDAP/TD IMMUNIZATION (1 - Tdap) Never done    ZOSTER IMMUNIZATION (1 of 2) Never done    LUNG CANCER SCREENING  Never done    HEPATITIS B IMMUNIZATION (1 of 3 - Risk 3-dose series) Never done    RSV VACCINE (1 - Risk 60-74 years 1-dose series) Never done    Pneumococcal Vaccine: 65+ Years (2 of 2 - PCV) 11/27/2013    AORTIC ANEURYSM SCREENING (SYSTEM ASSIGNED)  Never done    DIABETIC FOOT EXAM  04/09/2020    ANNUAL REVIEW OF HM ORDERS  06/10/2023    INFLUENZA VACCINE (1) 09/01/2024    COVID-19 Vaccine (6 - 2024-25 season) 09/01/2024    LIPID  09/25/2024    MICROALBUMIN  09/25/2024    BMP  10/17/2024    MEDICARE ANNUAL WELLNESS VISIT  10/17/2024    A1C  04/21/2025    EYE EXAM  07/31/2025    FALL RISK ASSESSMENT  10/21/2025    ADVANCE CARE PLANNING  10/21/2029    COLORECTAL CANCER SCREENING  03/01/2032    PHQ-2 (once per calendar year)  Completed    HPV IMMUNIZATION  Aged Out    MENINGITIS IMMUNIZATION  Aged Out    RSV MONOCLONAL ANTIBODY  Aged Out       Complete review of systems is obtained.  Other than the specific considerations noted above complete review of systems is negative.       Objective    Exam  /68   Pulse 69   Temp 98  " F (36.7  C)   Resp 18   Ht 1.803 m (5' 11\")   Wt 113.9 kg (251 lb)   SpO2 98%   BMI 35.01 kg/m     Estimated body mass index is 35.01 kg/m  as calculated from the following:    Height as of this encounter: 1.803 m (5' 11\").    Weight as of this encounter: 113.9 kg (251 lb).    Physical Exam    General Appearance:    Alert, cooperative, no distress   Eyes:   No scleral icterus or conjunctival irritation       Ears:    Normal TM's and external ear canals, both ears   Throat:   Lips, mucosa, and tongue normal; teeth and gums normal   Neck:   Supple, symmetrical, trachea midline, no adenopathy;        thyroid:  No enlargement/tenderness/nodules   Lungs:     Clear to auscultation bilaterally, respirations unlabored, no wheezesor crackles   Heart:    Regular rate and rhythm,  No murmur   Abdomen:    Soft, no distention, no tenderness on palpation, no masses, no organomegaly     Extremities:  No edema, no jointswelling or redness, no evidence of any injuries, palpable dorsalis pedis pulses, palpable posterior tibialis pulses.  No ulcerations.  No significant callus formation.  No other foot deformities.   Skin:  Noconcerning skin findings, no suspicious moles, no rashes   Neurologic:  On gross examination there is no motor or sensory deficit.  Specific examination of the feet using the monofilament line reveals that there is no absence of sensation.  Patient walks with a normal gait            10/21/2024   Mini Cog   Clock Draw Score 2 Normal   3 Item Recall 3 objects recalled   Mini Cog Total Score 5                 Signed Electronically by: Eloy Anne MD, MD    "

## 2024-11-25 DIAGNOSIS — E11.65 TYPE 2 DIABETES MELLITUS WITH HYPERGLYCEMIA, WITHOUT LONG-TERM CURRENT USE OF INSULIN (H): ICD-10-CM

## 2024-12-01 NOTE — TELEPHONE ENCOUNTER
LCV:3/13/2024  [facility] (RTC 6 M)  RF 90 day    Scheduling has been notified to contact the pt for appointment.

## 2024-12-02 ENCOUNTER — TELEPHONE (OUTPATIENT)
Dept: ENDOCRINOLOGY | Facility: CLINIC | Age: 74
End: 2024-12-02
Payer: COMMERCIAL

## 2024-12-02 NOTE — TELEPHONE ENCOUNTER
Patient confirmed scheduled appointment:  Date: 12/05/24  Time: 11:00  Visit type: RETURN DIABETES  Provider: Elizabeth Guzman PA-C  Location: [facility] DIABETES  Testing/imaging: N/A  Additional notes: Scheduled per pt for Rx refills. Will be in [place] for visit.     Scheduling: pt of Elizabeth Guzman- past due for appt, pls contact pt for scheduling-thanks   Have a good day,   Alissa BUNCH,   Central Medication Refill Team    Hilda Beth on 12/2/2024 at 3:39 PM

## 2024-12-03 ENCOUNTER — TELEPHONE (OUTPATIENT)
Dept: ENDOCRINOLOGY | Facility: CLINIC | Age: 74
End: 2024-12-03
Payer: COMMERCIAL

## 2024-12-03 NOTE — PROGRESS NOTES
Outcome for 12/03/24 6:55 AM: [employer] message sent  Lou Trevizo MA  Outcome for 12/03/24 12:36 PM:  Patient states he does not have any BG readings for provider to review. Pt advises he has not been really checking BG at home.  Lou Trevizo MA    Patient is showing 5/5 [place]CM met.   Lou Trevizo MA

## 2024-12-03 NOTE — TELEPHONE ENCOUNTER
Spoke with patient. Pt states he has not been checking blood sugars and has not BG readings for provider to review for his appt on 12/5/24. Lou Tervizo CMA on 12/3/2024 at 12:38 PM

## 2024-12-05 ENCOUNTER — VIRTUAL VISIT (OUTPATIENT)
Dept: ENDOCRINOLOGY | Facility: CLINIC | Age: 74
End: 2024-12-05
Payer: COMMERCIAL

## 2024-12-05 DIAGNOSIS — E11.65 TYPE 2 DIABETES MELLITUS WITH HYPERGLYCEMIA, WITHOUT LONG-TERM CURRENT USE OF INSULIN (H): ICD-10-CM

## 2024-12-05 RX ORDER — LISINOPRIL 10 MG/1
10 TABLET ORAL DAILY
Qty: 90 TABLET | Refills: 3 | Status: SHIPPED | OUTPATIENT
Start: 2024-12-05

## 2024-12-05 NOTE — PROGRESS NOTES
Time of start: 11:05 AM  Time of end: 11:21 AM  Total duration of video visit: 16 minutes.  Providers location: Off-site  Patients location: [facility]   Mark Garces is a 74 year old male with type 2 diabetes mellitus. Video visit for diabetes follow up.  Last visit with me was in March 2024.  Pt's hx is significant for type 2 DM dx in 2007, obesity, hyperlipidemia, COVID19 infection Nov 2020, SUE and past hx of Hepatitis C which has been treated successfully.  He was seen by Oph on 7/31/2024 with no evidence of diabetic retinopathy.    He also has a hx of microalbuminuria.    No sx of diabetic neuropathy.  For his diabetes, he is currently taking Metformin 500 mg 2 tabs po BID, Jardiance 25 mg daily and Januvia 100 mg daily.  Mark is tolerating all his diabetic meds well.  Most recent A1C was higher at 7.5 % on 10/21/2024.    Previous A1C was 6.9 % in May 2024.  Mark has not been checking his blood sugar.  Pt states he is not checking his blood sugar at home because he does not like to stick himself with the lancets. He was told his insurance will not cover a Freestyle Ama sensor or CGM.  On ROS today, he reports feeling well.  He has been working on his diet and increasing his exercise.  Using CPAP nightly.  Pt denies blurred vision, n/v, SOB at rest, fever, chest pain, abdominal pain, diarrhea, blood in stool or melena.  Patient denies dysuria, hematuria or symptoms of groin yeast infection.  Patient has no symptoms of diabetic peripheral neuropathy.  Denies foot ulcers.    DIABETES CARE:  Retinopathy: Patient was seen by Oph in Nov 2021 at [facility] and he was told he has diabetic retinopathy.  Most recent ophthalmology exam done 7/31/2024 showed no evidence of diabetic retinopathy.  Nephropathy: urine microalbuminuria + in 6/2022 and negative in October 2024.   Pt taking Lisinopril.  Neuropathy: None.  Foot exam: no exam today.  SUE: yes- using CPAP.  Taking ASA: yes.  Lipids: LDL 81  in October 2024. Pt taking Simvastatin.  Insulin: none.   DM meds: Metformin, Jardiance and Januvia.  Testing: not testing.   Freestyle Ama sensor/CGM not covered by insurance.    ROS   Please see under HPI.   ALLERGIES:   No Known Allergies      Prescription Medications as of 12/5/2024         Rx Number Disp Refills Start End Last Dispensed Date Next Fill Date Owning Pharmacy    aspirin 81 MG tablet  -- --  --       Sig: Take 1 tablet by mouth daily.    Class: Historical    Route: Oral    blood glucose (NO BRAND SPECIFIED) test strip  200 strip 1 10/18/2023 --   [facility] - [place] - [address]    Sig: Use to test blood sugar twice daily.  ONE TOUCH ULTRA STRIPS    Class: E-Prescribe    empagliflozin (JARDIANCE) 25 MG TABS tablet  90 tablet 1 5/20/2024 --   [facility] - [place] - [address]    Sig: Take 1 tablet (25 mg) by mouth daily    Class: E-Prescribe    Route: Oral    lisinopril (ZESTRIL) 10 MG tablet  90 tablet 1 5/20/2024 --   [facility] - [place] - [address]    Sig: Take 1 tablet (10 mg) by mouth daily    Class: E-Prescribe    Route: Oral    metFORMIN (GLUCOPHAGE) 500 MG tablet  360 tablet 1 5/20/2024 --   [facility] - [place] - [address]    Sig: Take 2 tablets by mouth twice daily.    Class: E-Prescribe    Omega-3 Fatty Acids (FISH OIL) 500 MG CAPS  -- --  --       Sig: Take 2 capsules by mouth daily.    Class: Historical    Route: Oral    simvastatin (ZOCOR) 40 MG tablet  90 tablet 0 10/17/2024 --   [facility] - [place] - [address]    Sig: Take 1 tablet by mouth daily.    Class: E-Prescribe    Renewals       Renewal provider: Elizabeth Guzman PA-C            sitagliptin (JANUVIA) 100 MG tablet  90 tablet 0 12/1/2024 --    [facility] - [place], [place] - [address]    Sig: Take 1 tablet by mouth daily.  For additional refills, please schedule a follow-up appointment.    Class: E-Prescribe          Family Hx   No family hx of diabetes.    Personal Hx   Smoke: None at this time.  ETOH : None at this time.    PMH   1. Type 2 Diabetes Mellitus dx in 2007.   2. Hepatitis C; pt underwent tx for Hep C and his Hep C has cleared.  3. Obesity.  4. Past hx of nicotine use.   5. Hx of undescended testis s/p surgery in 1971.   6. Anemia while undergoing hep C treatment.  7. Retinopathy.  8. Microalbuminuria.  9. Hyperlipidemia.  10.  Sleep apnea.    Physical Exam   No exam today.    Results   Creatinine   Date Value Ref Range Status   10/21/2024 1.00 0.67 - 1.17 mg/dL Final   09/01/2020 0.90 0.66 - 1.25 mg/dL Final     GFR Estimate   Date Value Ref Range Status   10/21/2024 80 >60 mL/min/1.73m2 Final     Comment:     eGFR calculated using 2021 CKD-EPI equation.   09/01/2020 86 >60 mL/min/[1.73_m2] Final     Comment:     Non  GFR Calc  Starting 12/18/2018, serum creatinine based estimated GFR (eGFR) will be   calculated using the Chronic Kidney Disease Epidemiology Collaboration   (CKD-EPI) equation.       Hemoglobin A1C   Date Value Ref Range Status   10/21/2024 7.5 (H) 0.0 - 5.6 % Final     Comment:     Normal <5.7%   Prediabetes 5.7-6.4%    Diabetes 6.5% or higher     Note: Adopted from ADA consensus guidelines.   09/01/2020 7.3 (H) 0 - 5.6 % Final     Comment:     Normal <5.7% Prediabetes 5.7-6.4%  Diabetes 6.5% or higher - adopted from ADA   consensus guidelines.       Potassium   Date Value Ref Range Status   10/21/2024 4.7 3.4 - 5.3 mmol/L Final   06/10/2022 4.5 3.5 - 5.0 mmol/L Final   04/09/2019 4.0 3.4 - 5.3 mmol/L Final     ALT   Date Value Ref Range Status   10/21/2024 13 0 - 70 U/L Final   09/01/2020 18 0 - 70 U/L Final     AST   Date Value Ref Range Status   10/21/2024 17 0  - 45 U/L Final   09/01/2020 7 0 - 45 U/L Final     TSH   Date Value Ref Range Status   09/25/2023 2.78 0.30 - 4.20 uIU/mL Final   11/15/2021 2.95 0.30 - 5.00 uIU/mL Final   09/01/2020 2.85 0.40 - 4.00 mU/L Final     T4 Free   Date Value Ref Range Status   09/07/2017 1.02 0.76 - 1.46 ng/dL Final         Cholesterol   Date Value Ref Range Status   10/21/2024 158 <200 mg/dL Final   09/25/2023 169 <200 mg/dL Final   09/01/2020 146 <200 mg/dL Final   04/09/2019 152 <200 mg/dL Final     HDL Cholesterol   Date Value Ref Range Status   09/01/2020 53 >39 mg/dL Final   04/09/2019 55 >39 mg/dL Final     Direct Measure HDL   Date Value Ref Range Status   10/21/2024 59 >=40 mg/dL Final   09/25/2023 58 >=40 mg/dL Final     LDL Cholesterol Calculated   Date Value Ref Range Status   10/21/2024 81 <100 mg/dL Final   09/25/2023 95 <=100 mg/dL Final   09/01/2020 79 <100 mg/dL Final     Comment:     Desirable:       <100 mg/dl   04/09/2019 77 <100 mg/dL Final     Comment:     Desirable:       <100 mg/dl     Triglycerides   Date Value Ref Range Status   10/21/2024 91 <150 mg/dL Final   09/25/2023 80 <150 mg/dL Final   09/01/2020 71 <150 mg/dL Final   04/09/2019 96 <150 mg/dL Final     Cholesterol/HDL Ratio   Date Value Ref Range Status   08/20/2014 2.8 0.0 - 5.0 Final   11/19/2013 3.7 0.0 - 5.0 Final       ASSESSMENT/PLAN:     1. TYPE 2 DIABETES MELLITUS: Mark continues to tolerate all his diabetic medications well.    Most recent A1C was higher at 7.5 % in October 2024.  Patient reports working on making healthy food choices and has increased his exercise.  Will continue Jardiance 25 mg each am, Januvia 100 mg daily and Metformin 1000 mg BID.  Mark would like to avoid injectable drugs.   I have no blood sugar readings since Mark does not check his blood sugar.  Freestyle Ama sensor/CGM is not covered by his insurance.  Reminded him to stay well-hydrated.  Pt's creat was 1.00 with GFR 80 mL/min in October 2024.    He denies sx  of diabetic neuropathy or foot ulcers.    2.  RETINOPATHY: Seen by Oph 11/18/2021 and was told he has diabetic retinopathy.  Most recent ophthalmology exam done 7/31/2024 showed no evidence of diabetic retinopathy.    3.  MICROALBUMINURIA: Most recent urine microalbuminuria negative in October 2024.    Pt is taking Lisinopril.    4.  HYPERLIPIDEMIA: LDL 81 in October 2024. Pt is taking Simvastatin and fish oil.    5. OVERWEIGHT: Patient has been working on making healthier food choices and has also increased his exercise.    6.  SUE: Using CPAP equipment nightly.    7.  HEALTH MAINTENANCE: Patient seen by his primary care provider for health maintenance in October 2024.  8. FOLLOW UP: With me in 6 months.  A1C and Ca++ ordered today-to be done after 2/1/2025.    Time spent reviewing chart and labs today = 5 minutes.  Time for video visit today= 16 minutes.  Time for documentation today = 10 minutes.    TOTAL TIME FOR VISIT TODAY = 31 minutes.    Elizabeth Guzman PA-C

## 2024-12-05 NOTE — NURSING NOTE
Current patient location:  [place]    Is the patient currently in the state of [place]? YES    Visit mode:VIDEO    If the visit is dropped, the patient can be reconnected by:VIDEO VISIT: Text to cell phone:   Telephone Information:   Mobile [phone]       Will anyone else be joining the visit? NO  (If patient encounters technical issues they should call [phone])    Are changes needed to the allergy or medication list? No    Are refills needed on medications prescribed by this physician? NO    Rooming Documentation:  Questionnaire(s) completed    Reason for visit: Video Visit and RECHECK    Yakelin PETTIT

## 2024-12-30 ENCOUNTER — TELEPHONE (OUTPATIENT)
Dept: ENDOCRINOLOGY | Facility: CLINIC | Age: 74
End: 2024-12-30
Payer: COMMERCIAL

## 2024-12-30 NOTE — TELEPHONE ENCOUNTER
Patient confirmed scheduled appointment:  Date: 06/04  Time: 10:00  Visit type: return diabetes  Provider: Megan  Location: virtual  Testing/imaging: labs 02/03  Additional notes:      Check Out Comments: Appointment with me in 6 months. Future labs ordered.     Tereza Alegria on 12/30/2024 at 12:56 PM

## 2025-02-03 ENCOUNTER — TELEPHONE (OUTPATIENT)
Dept: ENDOCRINOLOGY | Facility: CLINIC | Age: 75
End: 2025-02-03

## 2025-02-03 ENCOUNTER — LAB (OUTPATIENT)
Dept: LAB | Facility: CLINIC | Age: 75
End: 2025-02-03
Payer: COMMERCIAL

## 2025-02-03 DIAGNOSIS — E11.65 TYPE 2 DIABETES MELLITUS WITH HYPERGLYCEMIA, WITHOUT LONG-TERM CURRENT USE OF INSULIN (H): ICD-10-CM

## 2025-02-03 DIAGNOSIS — Z79.4 TYPE 2 DIABETES MELLITUS WITH COMPLICATION, WITH LONG-TERM CURRENT USE OF INSULIN (H): Primary | ICD-10-CM

## 2025-02-03 DIAGNOSIS — E11.8 TYPE 2 DIABETES MELLITUS WITH COMPLICATION, WITH LONG-TERM CURRENT USE OF INSULIN (H): Primary | ICD-10-CM

## 2025-02-03 LAB
CALCIUM SERPL-MCNC: 10.4 MG/DL (ref 8.8–10.4)
EST. AVERAGE GLUCOSE BLD GHB EST-MCNC: 166 MG/DL
HBA1C MFR BLD: 7.4 % (ref 0–5.6)

## 2025-02-03 PROCEDURE — 36415 COLL VENOUS BLD VENIPUNCTURE: CPT

## 2025-02-03 PROCEDURE — 83036 HEMOGLOBIN GLYCOSYLATED A1C: CPT

## 2025-02-03 PROCEDURE — 82310 ASSAY OF CALCIUM: CPT

## 2025-02-03 NOTE — TELEPHONE ENCOUNTER
Prior Authorization Not Needed per Insurance    Medication: JANUVIA 100 MG tablet is covered under patients formulary plan.  Called and confirmed with insurance that Januvia 100mg is covered without needing a PA. There is no claim from the pharmacy.  Pt may contact the pharmacy to fill medication.    Please close encounter when finished.    Thank you,  Central Prior Authorization Team  [phone]

## 2025-02-03 NOTE — TELEPHONE ENCOUNTER
Prior Authorization Retail Medication Request    Medication/Dose: Januvia 100 mg  Diagnosis and ICD code (if different than what is on RX):  E11.9 Type 2 diabetes  New/renewal/insurance change PA/secondary ins. PA: NEW  Previously Tried and Failed:  Has been on Januvia 50 mg, Metformin 1000 mg  and Jardiance 25 mg   Rationale:  A1c 7.4 % needs increased dose Januvia 100 mg     Insurance   Primary: [employer]   Insurance ID:  [id number]      Pharmacy Information (if different than what is on RX)  [facility]  Phone:  [phone]  Fax:[phone]    Clinic Information Marked urgent he's rationing   Preferred routing pool for dept communication: Patient call

## 2025-02-04 ENCOUNTER — TELEPHONE (OUTPATIENT)
Dept: ENDOCRINOLOGY | Facility: CLINIC | Age: 75
End: 2025-02-04

## 2025-02-04 NOTE — TELEPHONE ENCOUNTER
MTM referral from: [facility] visit (referral by provider)    MTM referral outreach attempt #1 on February 4, 2025 at 2:43 PM      Outcome: patient is not interested in scheduling an appt with MTM at this time. He will call us back after he talks things through with provider.     Use hbc for the carrier/Plan on the flowsheet          Jesus De La O  MT

## 2025-02-25 DIAGNOSIS — E11.65 TYPE 2 DIABETES MELLITUS WITH HYPERGLYCEMIA, WITHOUT LONG-TERM CURRENT USE OF INSULIN (H): ICD-10-CM

## 2025-04-30 ENCOUNTER — TELEPHONE (OUTPATIENT)
Dept: ENDOCRINOLOGY | Facility: CLINIC | Age: 75
End: 2025-04-30
Payer: COMMERCIAL

## 2025-04-30 NOTE — TELEPHONE ENCOUNTER
Patient confirmed scheduled appointment:  Date: 08/14/25  Time: 9:30  Visit type: RETURN DIABETES  Provider: Elizabeth Guzman PA-C  Location: [facility] DIABETES  Testing/imaging: N/A  Additional notes: Scheduled per pt. Pt agreeable to waiting until Janine gets back as he just met with his PCP for an A1c check/general wellness and is comfortable seeing him for diabetes care, should the need arise before August.    Hilda Beth on 4/30/2025 at 3:22 PM

## 2025-05-13 ENCOUNTER — MYC REFILL (OUTPATIENT)
Dept: ENDOCRINOLOGY | Facility: CLINIC | Age: 75
End: 2025-05-13
Payer: COMMERCIAL

## 2025-05-13 DIAGNOSIS — E11.65 TYPE 2 DIABETES MELLITUS WITH HYPERGLYCEMIA, WITHOUT LONG-TERM CURRENT USE OF INSULIN (H): ICD-10-CM

## 2025-08-12 ENCOUNTER — TELEPHONE (OUTPATIENT)
Dept: ENDOCRINOLOGY | Facility: CLINIC | Age: 75
End: 2025-08-12

## 2025-08-12 ENCOUNTER — LAB (OUTPATIENT)
Dept: LAB | Facility: CLINIC | Age: 75
End: 2025-08-12
Payer: COMMERCIAL

## 2025-08-12 DIAGNOSIS — E11.9 DIABETES MELLITUS (H): Primary | ICD-10-CM

## 2025-08-12 DIAGNOSIS — E11.9 DIABETES MELLITUS (H): ICD-10-CM

## 2025-08-12 LAB
ERYTHROCYTE [DISTWIDTH] IN BLOOD BY AUTOMATED COUNT: 15.6 % (ref 10–15)
EST. AVERAGE GLUCOSE BLD GHB EST-MCNC: 166 MG/DL
HBA1C MFR BLD: 7.4 % (ref 0–5.6)
HCT VFR BLD AUTO: 39.4 % (ref 40–53)
HGB BLD-MCNC: 12.6 G/DL (ref 13.3–17.7)
MCH RBC QN AUTO: 28.1 PG (ref 26.5–33)
MCHC RBC AUTO-ENTMCNC: 32 G/DL (ref 31.5–36.5)
MCV RBC AUTO: 88 FL (ref 78–100)
PLATELET # BLD AUTO: 248 10E3/UL (ref 150–450)
RBC # BLD AUTO: 4.48 10E6/UL (ref 4.4–5.9)
WBC # BLD AUTO: 9.7 10E3/UL (ref 4–11)

## 2025-08-12 PROCEDURE — 83036 HEMOGLOBIN GLYCOSYLATED A1C: CPT

## 2025-08-12 PROCEDURE — 3051F HG A1C>EQUAL 7.0%<8.0%: CPT

## 2025-08-14 ENCOUNTER — VIRTUAL VISIT (OUTPATIENT)
Dept: ENDOCRINOLOGY | Facility: CLINIC | Age: 75
End: 2025-08-14
Payer: COMMERCIAL

## 2025-08-14 DIAGNOSIS — E11.8 TYPE 2 DIABETES MELLITUS WITH COMPLICATION, WITH LONG-TERM CURRENT USE OF INSULIN (H): Primary | ICD-10-CM

## 2025-08-14 DIAGNOSIS — Z79.4 TYPE 2 DIABETES MELLITUS WITH COMPLICATION, WITH LONG-TERM CURRENT USE OF INSULIN (H): Primary | ICD-10-CM

## 2025-08-14 ASSESSMENT — PAIN SCALES - GENERAL: PAINLEVEL_OUTOF10: NO PAIN (0)
